# Patient Record
Sex: FEMALE | Race: WHITE | NOT HISPANIC OR LATINO | Employment: FULL TIME | URBAN - METROPOLITAN AREA
[De-identification: names, ages, dates, MRNs, and addresses within clinical notes are randomized per-mention and may not be internally consistent; named-entity substitution may affect disease eponyms.]

---

## 2017-03-22 ENCOUNTER — COMMUNICATION - HEALTHEAST (OUTPATIENT)
Dept: ADMINISTRATIVE | Facility: CLINIC | Age: 37
End: 2017-03-22

## 2017-03-24 ENCOUNTER — COMMUNICATION - HEALTHEAST (OUTPATIENT)
Dept: OBGYN | Facility: CLINIC | Age: 37
End: 2017-03-24

## 2017-06-23 ENCOUNTER — COMMUNICATION - HEALTHEAST (OUTPATIENT)
Dept: MIDWIFE SERVICES | Facility: CLINIC | Age: 37
End: 2017-06-23

## 2017-06-23 DIAGNOSIS — Z30.9 CONTRACEPTION MANAGEMENT: ICD-10-CM

## 2017-06-27 ENCOUNTER — COMMUNICATION - HEALTHEAST (OUTPATIENT)
Dept: MIDWIFE SERVICES | Facility: CLINIC | Age: 37
End: 2017-06-27

## 2017-06-29 ENCOUNTER — COMMUNICATION - HEALTHEAST (OUTPATIENT)
Dept: ADMINISTRATIVE | Facility: CLINIC | Age: 37
End: 2017-06-29

## 2017-07-17 ENCOUNTER — OFFICE VISIT - HEALTHEAST (OUTPATIENT)
Dept: MIDWIFE SERVICES | Facility: CLINIC | Age: 37
End: 2017-07-17

## 2017-07-17 DIAGNOSIS — Z30.9 CONTRACEPTION MANAGEMENT: ICD-10-CM

## 2017-07-17 DIAGNOSIS — Z00.00 ANNUAL PHYSICAL EXAM: ICD-10-CM

## 2017-07-17 ASSESSMENT — MIFFLIN-ST. JEOR: SCORE: 1209.75

## 2017-08-03 ENCOUNTER — COMMUNICATION - HEALTHEAST (OUTPATIENT)
Dept: OBGYN | Facility: CLINIC | Age: 37
End: 2017-08-03

## 2017-08-03 ENCOUNTER — AMBULATORY - HEALTHEAST (OUTPATIENT)
Dept: LAB | Facility: CLINIC | Age: 37
End: 2017-08-03

## 2017-08-03 DIAGNOSIS — Z00.00 ANNUAL PHYSICAL EXAM: ICD-10-CM

## 2017-08-03 LAB
CHOLEST SERPL-MCNC: 148 MG/DL
FASTING STATUS PATIENT QL REPORTED: YES
HDLC SERPL-MCNC: 55 MG/DL
LDLC SERPL CALC-MCNC: 84 MG/DL
TRIGL SERPL-MCNC: 45 MG/DL

## 2017-08-12 ENCOUNTER — HEALTH MAINTENANCE LETTER (OUTPATIENT)
Age: 37
End: 2017-08-12

## 2017-09-14 ENCOUNTER — COMMUNICATION - HEALTHEAST (OUTPATIENT)
Dept: MIDWIFE SERVICES | Facility: CLINIC | Age: 37
End: 2017-09-14

## 2017-09-14 ENCOUNTER — COMMUNICATION - HEALTHEAST (OUTPATIENT)
Dept: OBGYN | Facility: CLINIC | Age: 37
End: 2017-09-14

## 2017-09-18 ENCOUNTER — COMMUNICATION - HEALTHEAST (OUTPATIENT)
Dept: MIDWIFE SERVICES | Facility: CLINIC | Age: 37
End: 2017-09-18

## 2017-09-18 ENCOUNTER — AMBULATORY - HEALTHEAST (OUTPATIENT)
Dept: OBGYN | Facility: CLINIC | Age: 37
End: 2017-09-18

## 2017-09-18 ENCOUNTER — COMMUNICATION - HEALTHEAST (OUTPATIENT)
Dept: OBGYN | Facility: CLINIC | Age: 37
End: 2017-09-18

## 2017-09-18 DIAGNOSIS — Z30.9 CONTRACEPTIVE MANAGEMENT: ICD-10-CM

## 2017-10-04 ENCOUNTER — OFFICE VISIT - HEALTHEAST (OUTPATIENT)
Dept: MIDWIFE SERVICES | Facility: CLINIC | Age: 37
End: 2017-10-04

## 2017-10-04 DIAGNOSIS — Z23 NEED FOR INFLUENZA VACCINATION: ICD-10-CM

## 2017-10-04 DIAGNOSIS — F41.9 ANXIETY: ICD-10-CM

## 2017-10-04 DIAGNOSIS — F32.A DEPRESSION, UNSPECIFIED DEPRESSION TYPE: ICD-10-CM

## 2017-10-04 DIAGNOSIS — Z13.31 POSITIVE DEPRESSION SCREENING: ICD-10-CM

## 2017-10-04 ASSESSMENT — MIFFLIN-ST. JEOR: SCORE: 1230.16

## 2017-11-13 ENCOUNTER — OFFICE VISIT - HEALTHEAST (OUTPATIENT)
Dept: FAMILY MEDICINE | Facility: CLINIC | Age: 37
End: 2017-11-13

## 2017-11-13 DIAGNOSIS — F41.1 GAD (GENERALIZED ANXIETY DISORDER): ICD-10-CM

## 2017-11-13 DIAGNOSIS — F33.0 MILD EPISODE OF RECURRENT MAJOR DEPRESSIVE DISORDER (H): ICD-10-CM

## 2017-11-13 ASSESSMENT — MIFFLIN-ST. JEOR: SCORE: 1220.64

## 2017-11-22 ENCOUNTER — OFFICE VISIT - HEALTHEAST (OUTPATIENT)
Dept: FAMILY MEDICINE | Facility: CLINIC | Age: 37
End: 2017-11-22

## 2017-11-22 DIAGNOSIS — L01.00 IMPETIGO: ICD-10-CM

## 2017-11-30 ENCOUNTER — COMMUNICATION - HEALTHEAST (OUTPATIENT)
Dept: FAMILY MEDICINE | Facility: CLINIC | Age: 37
End: 2017-11-30

## 2017-11-30 DIAGNOSIS — F41.1 GAD (GENERALIZED ANXIETY DISORDER): ICD-10-CM

## 2017-11-30 DIAGNOSIS — F33.0 MILD EPISODE OF RECURRENT MAJOR DEPRESSIVE DISORDER (H): ICD-10-CM

## 2017-12-21 ENCOUNTER — OFFICE VISIT - HEALTHEAST (OUTPATIENT)
Dept: FAMILY MEDICINE | Facility: CLINIC | Age: 37
End: 2017-12-21

## 2017-12-21 DIAGNOSIS — L71.0 DERMATITIS, PERIORAL: ICD-10-CM

## 2017-12-21 ASSESSMENT — MIFFLIN-ST. JEOR: SCORE: 1231.07

## 2018-01-04 ENCOUNTER — COMMUNICATION - HEALTHEAST (OUTPATIENT)
Dept: FAMILY MEDICINE | Facility: CLINIC | Age: 38
End: 2018-01-04

## 2018-02-07 ENCOUNTER — COMMUNICATION - HEALTHEAST (OUTPATIENT)
Dept: MIDWIFE SERVICES | Facility: CLINIC | Age: 38
End: 2018-02-07

## 2018-02-07 DIAGNOSIS — Z30.41 ORAL CONTRACEPTIVE PILL SURVEILLANCE: ICD-10-CM

## 2018-05-06 ENCOUNTER — COMMUNICATION - HEALTHEAST (OUTPATIENT)
Dept: FAMILY MEDICINE | Facility: CLINIC | Age: 38
End: 2018-05-06

## 2018-05-07 ENCOUNTER — COMMUNICATION - HEALTHEAST (OUTPATIENT)
Dept: FAMILY MEDICINE | Facility: CLINIC | Age: 38
End: 2018-05-07

## 2018-05-07 DIAGNOSIS — F41.1 GAD (GENERALIZED ANXIETY DISORDER): ICD-10-CM

## 2018-05-07 DIAGNOSIS — F33.0 MILD EPISODE OF RECURRENT MAJOR DEPRESSIVE DISORDER (H): ICD-10-CM

## 2018-07-19 ENCOUNTER — OFFICE VISIT - HEALTHEAST (OUTPATIENT)
Dept: MIDWIFE SERVICES | Facility: CLINIC | Age: 38
End: 2018-07-19

## 2018-07-19 DIAGNOSIS — Z30.9 CONTRACEPTION MANAGEMENT: ICD-10-CM

## 2018-07-19 DIAGNOSIS — F32.A DEPRESSION: ICD-10-CM

## 2018-07-19 DIAGNOSIS — Z01.419 WELL WOMAN EXAM: ICD-10-CM

## 2018-07-19 DIAGNOSIS — Z30.41 ORAL CONTRACEPTIVE PILL SURVEILLANCE: ICD-10-CM

## 2018-07-19 DIAGNOSIS — F41.9 ANXIETY: ICD-10-CM

## 2018-07-19 LAB
ALBUMIN SERPL-MCNC: 4.3 G/DL (ref 3.5–5)
ALP SERPL-CCNC: 58 U/L (ref 45–120)
ALT SERPL W P-5'-P-CCNC: 14 U/L (ref 0–45)
ANION GAP SERPL CALCULATED.3IONS-SCNC: 8 MMOL/L (ref 5–18)
AST SERPL W P-5'-P-CCNC: 20 U/L (ref 0–40)
BILIRUB SERPL-MCNC: 0.7 MG/DL (ref 0–1)
BUN SERPL-MCNC: 10 MG/DL (ref 8–22)
CALCIUM SERPL-MCNC: 9.3 MG/DL (ref 8.5–10.5)
CHLORIDE BLD-SCNC: 107 MMOL/L (ref 98–107)
CO2 SERPL-SCNC: 24 MMOL/L (ref 22–31)
CREAT SERPL-MCNC: 0.73 MG/DL (ref 0.6–1.1)
ERYTHROCYTE [DISTWIDTH] IN BLOOD BY AUTOMATED COUNT: 12.8 % (ref 11–14.5)
GFR SERPL CREATININE-BSD FRML MDRD: >60 ML/MIN/1.73M2
GLUCOSE BLD-MCNC: 88 MG/DL (ref 70–125)
HCT VFR BLD AUTO: 37.6 % (ref 35–47)
HGB BLD-MCNC: 12.1 G/DL (ref 12–16)
MCH RBC QN AUTO: 29.9 PG (ref 27–34)
MCHC RBC AUTO-ENTMCNC: 32.2 G/DL (ref 32–36)
MCV RBC AUTO: 93 FL (ref 80–100)
PLATELET # BLD AUTO: 212 THOU/UL (ref 140–440)
PMV BLD AUTO: 10.2 FL (ref 8.5–12.5)
POTASSIUM BLD-SCNC: 4.4 MMOL/L (ref 3.5–5)
PROT SERPL-MCNC: 6.8 G/DL (ref 6–8)
RBC # BLD AUTO: 4.05 MILL/UL (ref 3.8–5.4)
SODIUM SERPL-SCNC: 139 MMOL/L (ref 136–145)
TSH SERPL DL<=0.005 MIU/L-ACNC: 0.94 UIU/ML (ref 0.3–5)
WBC: 4.9 THOU/UL (ref 4–11)

## 2018-07-19 ASSESSMENT — MIFFLIN-ST. JEOR: SCORE: 1269.17

## 2018-07-26 ENCOUNTER — COMMUNICATION - HEALTHEAST (OUTPATIENT)
Dept: MIDWIFE SERVICES | Facility: CLINIC | Age: 38
End: 2018-07-26

## 2018-08-01 ENCOUNTER — COMMUNICATION - HEALTHEAST (OUTPATIENT)
Dept: MIDWIFE SERVICES | Facility: CLINIC | Age: 38
End: 2018-08-01

## 2018-08-01 DIAGNOSIS — R32 URINARY INCONTINENCE IN FEMALE: ICD-10-CM

## 2018-08-28 ENCOUNTER — THERAPY VISIT (OUTPATIENT)
Dept: PHYSICAL THERAPY | Facility: CLINIC | Age: 38
End: 2018-08-28
Payer: COMMERCIAL

## 2018-08-28 DIAGNOSIS — M72.2 PLANTAR FASCIITIS, RIGHT: Primary | ICD-10-CM

## 2018-08-28 PROCEDURE — 97530 THERAPEUTIC ACTIVITIES: CPT | Mod: GP | Performed by: PHYSICAL THERAPIST

## 2018-08-28 PROCEDURE — 97161 PT EVAL LOW COMPLEX 20 MIN: CPT | Mod: GP | Performed by: PHYSICAL THERAPIST

## 2018-08-28 PROCEDURE — 97110 THERAPEUTIC EXERCISES: CPT | Mod: GP | Performed by: PHYSICAL THERAPIST

## 2018-08-28 NOTE — MR AVS SNAPSHOT
After Visit Summary   8/28/2018    Angelic Marrero    MRN: [id number]           Patient Information     Date Of Birth          1980        Visit Information        Provider Department      8/28/2018 10:50 AM Mary Guerrero PT [facility] Physical Therapy        Today's Diagnoses     Plantar fasciitis, right    -  1       Follow-ups after your visit        Who to contact     If you have questions or need follow up information about today's clinic visit or your schedule please contact [facility] PHYSICAL THERAPY directly at [phone].  Normal or non-critical lab and imaging results will be communicated to you by [email or web address], letter or phone within 4 business days after the clinic has received the results. If you do not hear from us within 7 days, please contact the clinic through [employer] or phone. If you have a critical or abnormal lab result, we will notify you by phone as soon as possible.  Submit refill requests through [employer] or call your pharmacy and they will forward the refill request to us. Please allow 3 business days for your refill to be completed.          Additional Information About Your Visit        MyChart Information     [employer] gives you secure access to your electronic health record. If you see a primary care provider, you can also send messages to your care team and make appointments. If you have questions, please call your primary care clinic.  If you do not have a primary care provider, please call [phone] and they will assist you.        Care EveryWhere ID     This is your Care EveryWhere ID. This could be used by other organizations to access your [facility] medical records  [id number]         Blood Pressure from Last 3 Encounters:   04/30/07 100/60   12/19/06 110/68   05/11/06 (!) 84/60    Weight from Last 3 Encounters:   04/30/07 55.5 kg (122 lb 6.4 oz)   12/19/06 57.2 kg (126 lb)   05/11/06 56.7 kg  (125 lb)              We Performed the Following     MAYNOR Inital Eval Report     PT Eval, Low Complexity (70828)     Therapeutic Activities     Therapeutic Exercises        Primary Care Provider Office Phone # Fax #    Jeovany [facility] [address] [phone] [address] [facility] [address]        Equal Access to Services     ESTHER KRAUSE : Hadii aad ku hadasho Soomaali, waaxda luqadaha, qaybta kaalmada adeegyada, waxay idiin hayaan adeeg majorgerardosilas lalillian velasco. So [facility] [place] [phone].    ATENCIÓN: Si habla español, tiene a ash disposición servicios gratuitos de asistencia lingüística. Christieame al [phone].    We comply with applicable federal civil rights laws and [place] laws. We do not discriminate on the basis of race, color, national origin, age, disability, sex, sexual orientation, or gender identity.            Thank you!     Thank you for choosing [facility]  for your care. Our goal is always to provide you with excellent care. Hearing back from our patients is one way we can continue to improve our services. Please take a few minutes to complete the written survey that you may receive in the mail after your visit with us. Thank you!             Your Updated Medication List - Protect others around you: Learn how to safely use, store and throw away your medicines at www.disposemymeds.org.          This list is accurate as of 8/28/18  2:32 PM.  Always use your most recent med list.                   Brand Name Dispense Instructions for use Diagnosis    chloroquine 500 MG tablet    ARALEN    6    one tablet weekly starting one week prior to departure and continuing for 4 weeks after return    Other specified counseling       ZYRTEC 10 MG tablet   Generic drug:  cetirizine     30    1 TABLET DAILY    Allergic rhinitis, cause unspecified

## 2018-08-28 NOTE — PROGRESS NOTES
[facility] Initial Evaluation        Subjective:  Patient is a 38 year old female presenting with rehab right ankle/foot hpi.   Angelic Marrero is a 38 year old female with a right foot condition.  Condition occurred with:  Repetition/overuse (Pt. had a gradual onset of R planatr foot pain x 3 weeks w/out incident. Pt. now has pain with amb > 10 min and is unable to run. She was running 4 miles 3 x week with no problem. Pt. has no hx of plantar foot pain but had R achilles injury 10 years ago.).  Condition occurred: for unknown reasons.  This is a new condition  8-7-18.    Patient reports pain:  Sub calcaneus and longitudinal arch.  Radiates to:  No radiation.  Pain is described as aching and is intermittent and reported as 4/10.  Associated symptoms:  Loss of motion/stiffness. Pain is worse in the A.M..  Symptoms are exacerbated by walking, standing and running and relieved by rest.  Since onset symptoms are unchanged.  Special testing: none.  Previous treatment: none.    General health as reported by patient is excellent.                                              Objective:  Standing Alignment:                Ankle/Foot:  Pes cavus L and pes cavus R    Gait:    Gait Type:  Normal         Flexibility/Screens:       Lower Extremity:      Decreased right lower extremity flexibility:  Gastroc and Soleus          Ankle/Foot Evaluation  ROM:    AROM:    Dorsiflexion:  Left:   12  Right:   10  Plantarflexion:  Left:  85    Right:  85  Inversion:  Left:  57     Right:  55  Eversion:  25     Right:  25        Strength:    Dorsiflexion:  Right: 5/5   Pain:  Plantarflexion: Right: 4/5  Pain:  Inversion:Right: 5/5  Pain:  Eversion:Right: 5/5  Pain:                  LIGAMENT TESTING: normal                PALPATION: Palpation of ankle: very tender at R plantar foot just distal to the calcaneous.    EDEMA: normal          MOBILITY TESTING: normal                                                                 General     ROS    Assessment/Plan:    Patient is a 38 year old female with right side ankle complaints.    Patient has the following significant findings with corresponding treatment plan.                Diagnosis 1:  R plantar fasciitis  Pain -  self management and education  Decreased ROM/flexibility - manual therapy and therapeutic exercise  Decreased strength - therapeutic exercise and therapeutic activities  Impaired muscle performance - neuro re-education  Decreased function - therapeutic activities    Therapy Evaluation Codes:   1) History comprised of:   Personal factors that impact the plan of care:      None.    Comorbidity factors that impact the plan of care are:      None.     Medications impacting care: None.  2) Examination of Body Systems comprised of:   Body structures and functions that impact the plan of care:      Ankle.   Activity limitations that impact the plan of care are:      Running and Walking.  3) Clinical presentation characteristics are:   Stable/Uncomplicated.  4) Decision-Making    Low complexity using standardized patient assessment instrument and/or measureable assessment of functional outcome.  Cumulative Therapy Evaluation is: Low complexity.    Previous and current functional limitations:  (See Goal Flow Sheet for this information)    Short term and Long term goals: (See Goal Flow Sheet for this information)     Communication ability:  Patient appears to be able to clearly communicate and understand verbal and written communication and follow directions correctly.  Treatment Explanation - The following has been discussed with the patient:   RX ordered/plan of care  Anticipated outcomes  Possible risks and side effects  This patient would benefit from PT intervention to resume normal activities.   Rehab potential is good.    Frequency:  2 X a month, once daily  Duration:  for 2 months  Discharge Plan:  Achieve all LTG.  Independent in home treatment program.  Reach  maximal therapeutic benefit.    Please refer to the daily flowsheet for treatment today, total treatment time and time spent performing 1:1 timed codes.

## 2018-09-10 ENCOUNTER — COMMUNICATION - HEALTHEAST (OUTPATIENT)
Dept: FAMILY MEDICINE | Facility: CLINIC | Age: 38
End: 2018-09-10

## 2018-09-10 DIAGNOSIS — F33.0 MILD EPISODE OF RECURRENT MAJOR DEPRESSIVE DISORDER (H): ICD-10-CM

## 2018-09-10 DIAGNOSIS — F41.1 GAD (GENERALIZED ANXIETY DISORDER): ICD-10-CM

## 2018-10-09 ENCOUNTER — COMMUNICATION - HEALTHEAST (OUTPATIENT)
Dept: MIDWIFE SERVICES | Facility: CLINIC | Age: 38
End: 2018-10-09

## 2018-10-09 DIAGNOSIS — Z30.41 ORAL CONTRACEPTIVE PILL SURVEILLANCE: ICD-10-CM

## 2018-10-26 ENCOUNTER — OFFICE VISIT - HEALTHEAST (OUTPATIENT)
Dept: FAMILY MEDICINE | Facility: CLINIC | Age: 38
End: 2018-10-26

## 2018-10-26 DIAGNOSIS — F41.1 GAD (GENERALIZED ANXIETY DISORDER): ICD-10-CM

## 2018-10-26 DIAGNOSIS — F33.0 MILD EPISODE OF RECURRENT MAJOR DEPRESSIVE DISORDER (H): ICD-10-CM

## 2018-10-26 DIAGNOSIS — R63.5 WEIGHT GAIN: ICD-10-CM

## 2018-10-26 DIAGNOSIS — D64.9 ANEMIA, UNSPECIFIED TYPE: ICD-10-CM

## 2018-10-26 DIAGNOSIS — Z30.015 ENCOUNTER FOR INITIAL PRESCRIPTION OF VAGINAL RING HORMONAL CONTRACEPTIVE: ICD-10-CM

## 2018-10-26 DIAGNOSIS — R32 URINARY INCONTINENCE IN FEMALE: ICD-10-CM

## 2018-10-26 DIAGNOSIS — R53.83 FATIGUE, UNSPECIFIED TYPE: ICD-10-CM

## 2018-10-26 LAB
ERYTHROCYTE [DISTWIDTH] IN BLOOD BY AUTOMATED COUNT: 10.9 % (ref 11–14.5)
HCT VFR BLD AUTO: 40 % (ref 35–47)
HGB BLD-MCNC: 13.1 G/DL (ref 12–16)
IRON SATN MFR SERPL: 27 % (ref 20–50)
IRON SERPL-MCNC: 95 UG/DL (ref 42–175)
MCH RBC QN AUTO: 30.4 PG (ref 27–34)
MCHC RBC AUTO-ENTMCNC: 32.7 G/DL (ref 32–36)
MCV RBC AUTO: 93 FL (ref 80–100)
PLATELET # BLD AUTO: 259 THOU/UL (ref 140–440)
PMV BLD AUTO: 7.7 FL (ref 7–10)
RBC # BLD AUTO: 4.32 MILL/UL (ref 3.8–5.4)
TIBC SERPL-MCNC: 356 UG/DL (ref 313–563)
TRANSFERRIN SERPL-MCNC: 285 MG/DL (ref 212–360)
TSH SERPL DL<=0.005 MIU/L-ACNC: 0.86 UIU/ML (ref 0.3–5)
WBC: 7.3 THOU/UL (ref 4–11)

## 2018-10-26 ASSESSMENT — MIFFLIN-ST. JEOR: SCORE: 1282.32

## 2018-12-14 ENCOUNTER — COMMUNICATION - HEALTHEAST (OUTPATIENT)
Dept: MIDWIFE SERVICES | Facility: CLINIC | Age: 38
End: 2018-12-14

## 2018-12-17 ENCOUNTER — AMBULATORY - HEALTHEAST (OUTPATIENT)
Dept: OBGYN | Facility: CLINIC | Age: 38
End: 2018-12-17

## 2018-12-17 DIAGNOSIS — Z30.011 ENCOUNTER FOR INITIAL PRESCRIPTION OF CONTRACEPTIVE PILLS: ICD-10-CM

## 2019-01-16 ENCOUNTER — COMMUNICATION - HEALTHEAST (OUTPATIENT)
Dept: FAMILY MEDICINE | Facility: CLINIC | Age: 39
End: 2019-01-16

## 2019-01-16 DIAGNOSIS — F33.0 MILD EPISODE OF RECURRENT MAJOR DEPRESSIVE DISORDER (H): ICD-10-CM

## 2019-01-16 DIAGNOSIS — F41.1 GAD (GENERALIZED ANXIETY DISORDER): ICD-10-CM

## 2019-01-28 ENCOUNTER — RECORDS - HEALTHEAST (OUTPATIENT)
Dept: ADMINISTRATIVE | Facility: OTHER | Age: 39
End: 2019-01-28

## 2019-03-19 ENCOUNTER — COMMUNICATION - HEALTHEAST (OUTPATIENT)
Dept: FAMILY MEDICINE | Facility: CLINIC | Age: 39
End: 2019-03-19

## 2019-06-10 ENCOUNTER — COMMUNICATION - HEALTHEAST (OUTPATIENT)
Dept: FAMILY MEDICINE | Facility: CLINIC | Age: 39
End: 2019-06-10

## 2019-06-26 ENCOUNTER — OFFICE VISIT - HEALTHEAST (OUTPATIENT)
Dept: FAMILY MEDICINE | Facility: CLINIC | Age: 39
End: 2019-06-26

## 2019-06-26 DIAGNOSIS — F32.0 MILD MAJOR DEPRESSION (H): ICD-10-CM

## 2019-06-26 DIAGNOSIS — F33.0 MILD EPISODE OF RECURRENT MAJOR DEPRESSIVE DISORDER (H): ICD-10-CM

## 2019-06-26 ASSESSMENT — MIFFLIN-ST. JEOR: SCORE: 1281.87

## 2019-07-31 ENCOUNTER — OFFICE VISIT - HEALTHEAST (OUTPATIENT)
Dept: FAMILY MEDICINE | Facility: CLINIC | Age: 39
End: 2019-07-31

## 2019-07-31 DIAGNOSIS — F33.0 MILD EPISODE OF RECURRENT MAJOR DEPRESSIVE DISORDER (H): ICD-10-CM

## 2019-07-31 ASSESSMENT — MIFFLIN-ST. JEOR: SCORE: 1289.13

## 2019-08-01 ENCOUNTER — OFFICE VISIT - HEALTHEAST (OUTPATIENT)
Dept: MIDWIFE SERVICES | Facility: CLINIC | Age: 39
End: 2019-08-01

## 2019-08-01 DIAGNOSIS — R32 URINARY INCONTINENCE IN FEMALE: ICD-10-CM

## 2019-08-01 DIAGNOSIS — F33.0 MILD EPISODE OF RECURRENT MAJOR DEPRESSIVE DISORDER (H): ICD-10-CM

## 2019-08-01 DIAGNOSIS — F41.1 GENERALIZED ANXIETY DISORDER: ICD-10-CM

## 2019-08-01 DIAGNOSIS — Z01.419 WELL WOMAN EXAM: ICD-10-CM

## 2019-08-01 DIAGNOSIS — N39.3 STRESS INCONTINENCE: ICD-10-CM

## 2019-08-01 DIAGNOSIS — Z30.015 ENCOUNTER FOR INITIAL PRESCRIPTION OF VAGINAL RING HORMONAL CONTRACEPTIVE: ICD-10-CM

## 2019-08-01 ASSESSMENT — MIFFLIN-ST. JEOR: SCORE: 1285.5

## 2019-09-17 ENCOUNTER — OFFICE VISIT - HEALTHEAST (OUTPATIENT)
Dept: PHYSICAL THERAPY | Facility: REHABILITATION | Age: 39
End: 2019-09-17

## 2019-09-17 DIAGNOSIS — N39.3 STRESS INCONTINENCE: ICD-10-CM

## 2019-11-06 ENCOUNTER — HEALTH MAINTENANCE LETTER (OUTPATIENT)
Age: 39
End: 2019-11-06

## 2019-11-26 ENCOUNTER — COMMUNICATION - HEALTHEAST (OUTPATIENT)
Dept: MIDWIFE SERVICES | Facility: CLINIC | Age: 39
End: 2019-11-26

## 2019-11-26 DIAGNOSIS — Z30.011 ENCOUNTER FOR INITIAL PRESCRIPTION OF CONTRACEPTIVE PILLS: ICD-10-CM

## 2019-12-17 ENCOUNTER — COMMUNICATION - HEALTHEAST (OUTPATIENT)
Dept: FAMILY MEDICINE | Facility: CLINIC | Age: 39
End: 2019-12-17

## 2019-12-18 ENCOUNTER — OFFICE VISIT - HEALTHEAST (OUTPATIENT)
Dept: FAMILY MEDICINE | Facility: CLINIC | Age: 39
End: 2019-12-18

## 2019-12-18 DIAGNOSIS — E61.1 IRON DEFICIENCY: ICD-10-CM

## 2019-12-18 DIAGNOSIS — R53.82 CHRONIC FATIGUE: ICD-10-CM

## 2019-12-18 DIAGNOSIS — R20.0 ARM NUMBNESS LEFT: ICD-10-CM

## 2019-12-18 DIAGNOSIS — F32.0 MILD MAJOR DEPRESSION (H): ICD-10-CM

## 2019-12-18 LAB
ERYTHROCYTE [DISTWIDTH] IN BLOOD BY AUTOMATED COUNT: 12.8 % (ref 11–14.5)
HCT VFR BLD AUTO: 39.4 % (ref 35–47)
HGB BLD-MCNC: 12.8 G/DL (ref 12–16)
IRON SATN MFR SERPL: 24 % (ref 20–50)
IRON SERPL-MCNC: 89 UG/DL (ref 42–175)
MCH RBC QN AUTO: 29.9 PG (ref 27–34)
MCHC RBC AUTO-ENTMCNC: 32.5 G/DL (ref 32–36)
MCV RBC AUTO: 92 FL (ref 80–100)
PLATELET # BLD AUTO: 246 THOU/UL (ref 140–440)
PMV BLD AUTO: 10.3 FL (ref 8.5–12.5)
RBC # BLD AUTO: 4.28 MILL/UL (ref 3.8–5.4)
TIBC SERPL-MCNC: 374 UG/DL (ref 313–563)
TRANSFERRIN SERPL-MCNC: 299 MG/DL (ref 212–360)
TSH SERPL DL<=0.005 MIU/L-ACNC: 0.77 UIU/ML (ref 0.3–5)
VIT B12 SERPL-MCNC: 1028 PG/ML (ref 213–816)
WBC: 7.1 THOU/UL (ref 4–11)

## 2019-12-18 ASSESSMENT — MIFFLIN-ST. JEOR: SCORE: 1292.3

## 2019-12-20 ENCOUNTER — COMMUNICATION - HEALTHEAST (OUTPATIENT)
Dept: FAMILY MEDICINE | Facility: CLINIC | Age: 39
End: 2019-12-20

## 2019-12-30 ASSESSMENT — ANXIETY QUESTIONNAIRES
7. FEELING AFRAID AS IF SOMETHING AWFUL MIGHT HAPPEN: NOT AT ALL
GAD7 TOTAL SCORE: 3
1. FEELING NERVOUS, ANXIOUS, OR ON EDGE: SEVERAL DAYS
4. TROUBLE RELAXING: NOT AT ALL
6. BECOMING EASILY ANNOYED OR IRRITABLE: MORE THAN HALF THE DAYS
5. BEING SO RESTLESS THAT IT IS HARD TO SIT STILL: NOT AT ALL
2. NOT BEING ABLE TO STOP OR CONTROL WORRYING: NOT AT ALL
3. WORRYING TOO MUCH ABOUT DIFFERENT THINGS: NOT AT ALL

## 2019-12-30 ASSESSMENT — PATIENT HEALTH QUESTIONNAIRE - PHQ9: SUM OF ALL RESPONSES TO PHQ QUESTIONS 1-9: 9

## 2019-12-31 ENCOUNTER — COMMUNICATION - HEALTHEAST (OUTPATIENT)
Dept: FAMILY MEDICINE | Facility: CLINIC | Age: 39
End: 2019-12-31

## 2020-02-25 ENCOUNTER — COMMUNICATION - HEALTHEAST (OUTPATIENT)
Dept: SCHEDULING | Facility: CLINIC | Age: 40
End: 2020-02-25

## 2020-02-25 ENCOUNTER — OFFICE VISIT - HEALTHEAST (OUTPATIENT)
Dept: INTERNAL MEDICINE | Facility: CLINIC | Age: 40
End: 2020-02-25

## 2020-02-25 DIAGNOSIS — J32.9 RHINOSINUSITIS: ICD-10-CM

## 2020-02-25 DIAGNOSIS — J30.9 ALLERGIC RHINITIS, UNSPECIFIED SEASONALITY, UNSPECIFIED TRIGGER: ICD-10-CM

## 2020-02-25 DIAGNOSIS — R09.82 PND (POST-NASAL DRIP): ICD-10-CM

## 2020-02-25 DIAGNOSIS — J40 BRONCHITIS: ICD-10-CM

## 2020-03-30 ENCOUNTER — OFFICE VISIT - HEALTHEAST (OUTPATIENT)
Dept: FAMILY MEDICINE | Facility: CLINIC | Age: 40
End: 2020-03-30

## 2020-03-30 DIAGNOSIS — R05.9 COUGH: ICD-10-CM

## 2020-04-17 ENCOUNTER — COMMUNICATION - HEALTHEAST (OUTPATIENT)
Dept: FAMILY MEDICINE | Facility: CLINIC | Age: 40
End: 2020-04-17

## 2020-04-17 DIAGNOSIS — K90.41 GLUTEN INTOLERANCE: ICD-10-CM

## 2020-04-21 ENCOUNTER — COMMUNICATION - HEALTHEAST (OUTPATIENT)
Dept: FAMILY MEDICINE | Facility: CLINIC | Age: 40
End: 2020-04-21

## 2020-04-21 DIAGNOSIS — R05.9 COUGH: ICD-10-CM

## 2020-05-19 ENCOUNTER — COMMUNICATION - HEALTHEAST (OUTPATIENT)
Dept: FAMILY MEDICINE | Facility: CLINIC | Age: 40
End: 2020-05-19

## 2020-05-19 ENCOUNTER — AMBULATORY - HEALTHEAST (OUTPATIENT)
Dept: LAB | Facility: CLINIC | Age: 40
End: 2020-05-19

## 2020-05-19 DIAGNOSIS — K90.41 GLUTEN INTOLERANCE: ICD-10-CM

## 2020-05-20 LAB
GLIADIN IGA SER-ACNC: 1.3 U/ML
GLIADIN IGG SER-ACNC: <0.4 U/ML
IGA SERPL-MCNC: 183 MG/DL (ref 65–400)
TTG IGA SER-ACNC: 0.4 U/ML
TTG IGG SER-ACNC: <0.6 U/ML

## 2020-05-27 ENCOUNTER — OFFICE VISIT - HEALTHEAST (OUTPATIENT)
Dept: FAMILY MEDICINE | Facility: CLINIC | Age: 40
End: 2020-05-27

## 2020-05-27 DIAGNOSIS — F32.0 MILD MAJOR DEPRESSION (H): ICD-10-CM

## 2020-05-27 DIAGNOSIS — Z20.822 EXPOSURE TO 2019 NOVEL CORONAVIRUS: ICD-10-CM

## 2020-05-27 ASSESSMENT — ANXIETY QUESTIONNAIRES
1. FEELING NERVOUS, ANXIOUS, OR ON EDGE: NOT AT ALL
5. BEING SO RESTLESS THAT IT IS HARD TO SIT STILL: NOT AT ALL
GAD7 TOTAL SCORE: 1
3. WORRYING TOO MUCH ABOUT DIFFERENT THINGS: NOT AT ALL
4. TROUBLE RELAXING: NOT AT ALL
6. BECOMING EASILY ANNOYED OR IRRITABLE: SEVERAL DAYS
2. NOT BEING ABLE TO STOP OR CONTROL WORRYING: NOT AT ALL
7. FEELING AFRAID AS IF SOMETHING AWFUL MIGHT HAPPEN: NOT AT ALL

## 2020-05-27 ASSESSMENT — PATIENT HEALTH QUESTIONNAIRE - PHQ9: SUM OF ALL RESPONSES TO PHQ QUESTIONS 1-9: 3

## 2020-05-29 ENCOUNTER — AMBULATORY - HEALTHEAST (OUTPATIENT)
Dept: LAB | Facility: CLINIC | Age: 40
End: 2020-05-29

## 2020-05-29 DIAGNOSIS — Z20.822 EXPOSURE TO 2019 NOVEL CORONAVIRUS: ICD-10-CM

## 2020-06-29 ENCOUNTER — VIRTUAL VISIT (OUTPATIENT)
Dept: FAMILY MEDICINE | Facility: OTHER | Age: 40
End: 2020-06-29

## 2020-06-29 ENCOUNTER — AMBULATORY - HEALTHEAST (OUTPATIENT)
Dept: FAMILY MEDICINE | Facility: CLINIC | Age: 40
End: 2020-06-29

## 2020-06-29 DIAGNOSIS — Z20.822 SUSPECTED COVID-19 VIRUS INFECTION: ICD-10-CM

## 2020-06-29 NOTE — PROGRESS NOTES
"Date: 2020 10:30:55  Clinician: Cristy Johnson  Clinician NPI: [id number]  Patient: Angelic Marrero  Patient : 1980  Patient Address: [address] [place] [address]  Patient Phone: [phone]  Visit Protocol: URI  Patient Summary:  Angelic is a 39 year old ( : 1980 ) female who initiated a Visit for COVID-19 (Coronavirus) evaluation and screening. When asked the question \"Please sign me up to receive news, health information and promotions. \", Angelic responded \"No\".    Angelic states her symptoms started suddenly 3-4 days ago. After her symptoms started, they improved and then got worse again.   Her symptoms consist of malaise, a headache, a sore throat, enlarged lymph nodes, and myalgia.   Symptom details     Sore throat: Angelic reports having moderate throat pain (4-6 on a 10 point pain scale), does not have exudate on her tonsils, and can swallow liquids. The lymph nodes in her neck are enlarged. A rash has not appeared on the skin since the sore throat started.     Headache: She states the headache is mild (1-3 on a 10 point pain scale).      Angelic denies having wheezing, nausea, teeth pain, ageusia, diarrhea, vomiting, rhinitis, ear pain, chills, anosmia, facial pain or pressure, fever, cough, and nasal congestion. She also denies having recent facial or sinus surgery in the past 60 days, taking antibiotic medication in the past month, and having a sinus infection within the past year. She is not experiencing dyspnea.   Precipitating events  Within the past week, Angelic has not been exposed to someone with strep throat. She has not recently been exposed to someone with influenza. Angelic has been in close contact with the following high risk individuals: children under the age of 5.   Pertinent COVID-19 (Coronavirus) information  In the past 14 days, Angelic has not worked in a congregate living setting.   She does not work or volunteer as healthcare " worker or a  and does not work or volunteer in a healthcare facility.   Angelic also has not lived in a congregate living setting in the past 14 days. She does not live with a healthcare worker.   Angelic has not had a close contact with a laboratory-confirmed COVID-19 patient within 14 days of symptom onset.   Pertinent medical history  Angelic does not get yeast infections when she takes antibiotics.   Angelic does not need a return to work/school note.   Weight: 138 lbs   Angelic does not smoke or use smokeless tobacco.   She denies pregnancy and denies breastfeeding. She has menstruated in the past month.   Weight: 138 lbs    MEDICATIONS: Estarylla oral, Zyrtec oral, citalopram oral, ALLERGIES: NKDA  Clinician Response:  Dear Angelic,   Your symptoms show that you may have coronavirus (COVID-19). This illness can cause fever, cough and trouble breathing. Many people get a mild case and get better on their own. Some people can get very sick.  What should I do?  We would like to test you for this virus.   1. Please call [phone] to schedule your visit. Explain that you were referred by [facility] to have a COVID-19 test. Be ready to share your [facility] visit ID number.  The following will serve as your written order for this COVID Test, ordered by me, for the indication of suspected COVID [Z20.828]: The test will be ordered in [employer], our electronic health record, after you are scheduled. It will show as ordered and authorized by Paul Bautista MD.  Order: COVID-19 (Coronavirus) PCR for SYMPTOMATIC testing from [facility].      2. When it's time for your COVID test:  Stay at least 6 feet away from others. (If someone will drive you to your test, stay in the backseat, as far away from the  as you can.)   Cover your mouth and nose with a mask, tissue or washcloth.  Go straight to the testing site. Don't make any stops on the way there or back.      3.Starting now: Stay home and away from others  "(self-isolate) until:   You've had no fever---and no medicine that reduces fever---for 3 full days (72 hours). And...   Your other symptoms have gotten better. For example, your cough or breathing has improved. And...   At least 10 days have passed since your symptoms started.       During this time, don't leave the house except for testing or medical care.   Stay in your own room, even for meals. Use your own bathroom if you can.   Stay away from others in your home. No hugging, kissing or shaking hands. No visitors.  Don't go to work, school or anywhere else.    Clean \"high touch\" surfaces often (doorknobs, counters, handles, etc.). Use a household cleaning spray or wipes. You'll find a full list of  on the EPA website: www.epa.gov/pesticide-registration/list-n-disinfectants-use-against-sars-cov-2.   Cover your mouth and nose with a mask, tissue or washcloth to avoid spreading germs.  Wash your hands and face often. Use soap and water.  Caregivers in these groups are at risk for severe illness due to COVID-19:  o People 65 years and older  o People who live in a nursing home or long-term care facility  o People with chronic disease (lung, heart, cancer, diabetes, kidney, liver, immunologic)  o People who have a weakened immune system, including those who:   Are in cancer treatment  Take medicine that weakens the immune system, such as corticosteroids  Had a bone marrow or organ transplant  Have an immune deficiency  Have poorly controlled HIV or AIDS  Are obese (body mass index of 40 or higher)  Smoke regularly   o Caregivers should wear gloves while washing dishes, handling laundry and cleaning bedrooms and bathrooms.  o Use caution when washing and drying laundry: Don't shake dirty laundry, and use the warmest water setting that you can.  o For more tips, go to www.cdc.gov/coronavirus/2019-ncov/downloads/10Things.pdf.      How can I take care of myself?   Get lots of rest. Drink extra fluids (unless a " doctor has told you not to).   Take Tylenol (acetaminophen) for fever or pain. If you have liver or kidney problems, ask your family doctor if it's okay to take Tylenol.   Adults can take either:    650 mg (two 325 mg pills) every 4 to 6 hours, or...   1,000 mg (two 500 mg pills) every 8 hours as needed.    Note: Don't take more than 3,000 mg in one day. Acetaminophen is found in many medicines (both prescribed and over-the-counter medicines). Read all labels to be sure you don't take too much.   For children, check the Tylenol bottle for the right dose. The dose is based on the child's age or weight.    If you have other health problems (like cancer, heart failure, an organ transplant or severe kidney disease): Call your specialty clinic if you don't feel better in the next 2 days.       Know when to call 911. Emergency warning signs include:    Trouble breathing or shortness of breath Pain or pressure in the chest that doesn't go away Feeling confused like you haven't felt before, or not being able to wake up Bluish-colored lips or face.  Where can I get more information?   [facility] -- About COVID-19: [email or web address]   CDC -- What to Do If You're Sick: www.cdc.gov/coronavirus/2019-ncov/about/steps-when-sick.html   CDC -- Ending Home Isolation: www.cdc.gov/coronavirus/2019-ncov/hcp/disposition-in-home-patients.html   CDC -- Caring for Someone: www.cdc.gov/coronavirus/2019-ncov/if-you-are-sick/care-for-someone.html   [facility] -- Interim Guidance for Hospital Discharge to Home: www.health.[facility].[place]/diseases/coronavirus/hcp/hospdischarge.pdf   [facility] clinical trials (COVID-19 research studies): clinicalaffairs.[facility]/umn-clinical-trials    Below are the COVID-19 hotlines at the [place] Department of Health ([facility]). Interpreters are available.    For health questions: Call 443-822-2209 or 7-502-239-4707 (7 a.m. to 7 p.m.) For questions about schools and childcare: Call  [phone] or [phone] (7 a.m. to 7 p.m.)    Diagnosis: Cough  Diagnosis ICD: R05  Additional Clinician Notes: Dear Angelic, You could possibly have covid so we would like you to be tested. Please call the number listed to set up testing and you also need to self isolate for 10 days per instructions listed. I hope you feel better soon. Take care.

## 2020-06-30 ENCOUNTER — AMBULATORY - HEALTHEAST (OUTPATIENT)
Dept: FAMILY MEDICINE | Facility: CLINIC | Age: 40
End: 2020-06-30

## 2020-06-30 DIAGNOSIS — Z20.822 SUSPECTED COVID-19 VIRUS INFECTION: ICD-10-CM

## 2020-07-02 ENCOUNTER — COMMUNICATION - HEALTHEAST (OUTPATIENT)
Dept: FAMILY MEDICINE | Facility: CLINIC | Age: 40
End: 2020-07-02

## 2020-07-30 ENCOUNTER — COMMUNICATION - HEALTHEAST (OUTPATIENT)
Dept: MIDWIFE SERVICES | Facility: CLINIC | Age: 40
End: 2020-07-30

## 2020-08-20 ENCOUNTER — RECORDS - HEALTHEAST (OUTPATIENT)
Dept: ADMINISTRATIVE | Facility: OTHER | Age: 40
End: 2020-08-20

## 2020-09-14 ENCOUNTER — COMMUNICATION - HEALTHEAST (OUTPATIENT)
Dept: FAMILY MEDICINE | Facility: CLINIC | Age: 40
End: 2020-09-14

## 2020-09-14 DIAGNOSIS — F33.0 MILD EPISODE OF RECURRENT MAJOR DEPRESSIVE DISORDER (H): ICD-10-CM

## 2020-10-09 ENCOUNTER — COMMUNICATION - HEALTHEAST (OUTPATIENT)
Dept: MIDWIFE SERVICES | Facility: CLINIC | Age: 40
End: 2020-10-09

## 2020-10-09 DIAGNOSIS — Z30.011 ENCOUNTER FOR INITIAL PRESCRIPTION OF CONTRACEPTIVE PILLS: ICD-10-CM

## 2020-11-02 ENCOUNTER — COMMUNICATION - HEALTHEAST (OUTPATIENT)
Dept: INTERNAL MEDICINE | Facility: CLINIC | Age: 40
End: 2020-11-02

## 2020-11-18 ENCOUNTER — COMMUNICATION - HEALTHEAST (OUTPATIENT)
Dept: FAMILY MEDICINE | Facility: CLINIC | Age: 40
End: 2020-11-18

## 2020-11-18 DIAGNOSIS — Z30.011 ENCOUNTER FOR INITIAL PRESCRIPTION OF CONTRACEPTIVE PILLS: ICD-10-CM

## 2020-11-29 ENCOUNTER — HEALTH MAINTENANCE LETTER (OUTPATIENT)
Age: 40
End: 2020-11-29

## 2020-12-01 ENCOUNTER — OFFICE VISIT - HEALTHEAST (OUTPATIENT)
Dept: INTERNAL MEDICINE | Facility: CLINIC | Age: 40
End: 2020-12-01

## 2020-12-01 DIAGNOSIS — Z30.09 ENCOUNTER FOR OTHER GENERAL COUNSELING OR ADVICE ON CONTRACEPTION: ICD-10-CM

## 2020-12-01 DIAGNOSIS — F33.0 MILD EPISODE OF RECURRENT MAJOR DEPRESSIVE DISORDER (H): ICD-10-CM

## 2020-12-01 DIAGNOSIS — R32 URINARY INCONTINENCE IN FEMALE: ICD-10-CM

## 2020-12-01 DIAGNOSIS — Z30.011 ENCOUNTER FOR INITIAL PRESCRIPTION OF CONTRACEPTIVE PILLS: ICD-10-CM

## 2020-12-01 DIAGNOSIS — F32.0 MILD MAJOR DEPRESSION (H): ICD-10-CM

## 2020-12-01 DIAGNOSIS — Z00.00 ANNUAL PHYSICAL EXAM: ICD-10-CM

## 2020-12-01 DIAGNOSIS — F41.1 GENERALIZED ANXIETY DISORDER: ICD-10-CM

## 2020-12-01 DIAGNOSIS — J30.2 SEASONAL ALLERGIC RHINITIS, UNSPECIFIED TRIGGER: ICD-10-CM

## 2020-12-01 ASSESSMENT — ANXIETY QUESTIONNAIRES
4. TROUBLE RELAXING: NOT AT ALL
5. BEING SO RESTLESS THAT IT IS HARD TO SIT STILL: NOT AT ALL
3. WORRYING TOO MUCH ABOUT DIFFERENT THINGS: NOT AT ALL
2. NOT BEING ABLE TO STOP OR CONTROL WORRYING: NOT AT ALL
1. FEELING NERVOUS, ANXIOUS, OR ON EDGE: SEVERAL DAYS
7. FEELING AFRAID AS IF SOMETHING AWFUL MIGHT HAPPEN: NOT AT ALL
GAD7 TOTAL SCORE: 3
IF YOU CHECKED OFF ANY PROBLEMS ON THIS QUESTIONNAIRE, HOW DIFFICULT HAVE THESE PROBLEMS MADE IT FOR YOU TO DO YOUR WORK, TAKE CARE OF THINGS AT HOME, OR GET ALONG WITH OTHER PEOPLE: NOT DIFFICULT AT ALL
6. BECOMING EASILY ANNOYED OR IRRITABLE: MORE THAN HALF THE DAYS

## 2020-12-01 ASSESSMENT — PATIENT HEALTH QUESTIONNAIRE - PHQ9: SUM OF ALL RESPONSES TO PHQ QUESTIONS 1-9: 1

## 2020-12-01 ASSESSMENT — MIFFLIN-ST. JEOR: SCORE: 1312.15

## 2020-12-02 LAB
HPV SOURCE: NORMAL
HUMAN PAPILLOMA VIRUS 16 DNA: NEGATIVE
HUMAN PAPILLOMA VIRUS 18 DNA: NEGATIVE
HUMAN PAPILLOMA VIRUS FINAL DIAGNOSIS: NORMAL
HUMAN PAPILLOMA VIRUS OTHER HR: NEGATIVE
SPECIMEN DESCRIPTION: NORMAL

## 2020-12-08 LAB
BKR LAB AP ABNORMAL BLEEDING: NO
BKR LAB AP BIRTH CONTROL/HORMONES: NORMAL
BKR LAB AP CERVICAL APPEARANCE: NORMAL
BKR LAB AP GYN ADEQUACY: NORMAL
BKR LAB AP GYN INTERPRETATION: NORMAL
BKR LAB AP HPV REFLEX: NORMAL
BKR LAB AP LMP: NORMAL
BKR LAB AP PATIENT STATUS: NORMAL
BKR LAB AP PREVIOUS ABNORMAL: NORMAL
BKR LAB AP PREVIOUS NORMAL: 2015
HIGH RISK?: NO
PATH REPORT.COMMENTS IMP SPEC: NORMAL
RESULT FLAG (HE HISTORICAL CONVERSION): NORMAL

## 2021-02-03 ENCOUNTER — COMMUNICATION - HEALTHEAST (OUTPATIENT)
Dept: INTERNAL MEDICINE | Facility: CLINIC | Age: 41
End: 2021-02-03

## 2021-02-17 ENCOUNTER — OFFICE VISIT - HEALTHEAST (OUTPATIENT)
Dept: FAMILY MEDICINE | Facility: CLINIC | Age: 41
End: 2021-02-17

## 2021-02-17 DIAGNOSIS — F41.1 GENERALIZED ANXIETY DISORDER: ICD-10-CM

## 2021-02-17 DIAGNOSIS — R41.840 CONCENTRATION DEFICIT: ICD-10-CM

## 2021-02-17 DIAGNOSIS — F32.0 MILD MAJOR DEPRESSION (H): ICD-10-CM

## 2021-02-17 DIAGNOSIS — Z30.41 ENCOUNTER FOR SURVEILLANCE OF CONTRACEPTIVE PILLS: ICD-10-CM

## 2021-02-17 ASSESSMENT — ANXIETY QUESTIONNAIRES
4. TROUBLE RELAXING: SEVERAL DAYS
6. BECOMING EASILY ANNOYED OR IRRITABLE: MORE THAN HALF THE DAYS
1. FEELING NERVOUS, ANXIOUS, OR ON EDGE: SEVERAL DAYS
5. BEING SO RESTLESS THAT IT IS HARD TO SIT STILL: NOT AT ALL
2. NOT BEING ABLE TO STOP OR CONTROL WORRYING: NOT AT ALL
3. WORRYING TOO MUCH ABOUT DIFFERENT THINGS: NOT AT ALL
7. FEELING AFRAID AS IF SOMETHING AWFUL MIGHT HAPPEN: NOT AT ALL
IF YOU CHECKED OFF ANY PROBLEMS ON THIS QUESTIONNAIRE, HOW DIFFICULT HAVE THESE PROBLEMS MADE IT FOR YOU TO DO YOUR WORK, TAKE CARE OF THINGS AT HOME, OR GET ALONG WITH OTHER PEOPLE: SOMEWHAT DIFFICULT
GAD7 TOTAL SCORE: 4

## 2021-02-17 ASSESSMENT — PATIENT HEALTH QUESTIONNAIRE - PHQ9: SUM OF ALL RESPONSES TO PHQ QUESTIONS 1-9: 6

## 2021-02-23 ENCOUNTER — COMMUNICATION - HEALTHEAST (OUTPATIENT)
Dept: FAMILY MEDICINE | Facility: CLINIC | Age: 41
End: 2021-02-23

## 2021-03-02 ENCOUNTER — COMMUNICATION - HEALTHEAST (OUTPATIENT)
Dept: FAMILY MEDICINE | Facility: CLINIC | Age: 41
End: 2021-03-02

## 2021-03-02 ENCOUNTER — TELEPHONE (OUTPATIENT)
Dept: PSYCHIATRY | Facility: CLINIC | Age: 41
End: 2021-03-02

## 2021-03-02 NOTE — TELEPHONE ENCOUNTER
PSYCHIATRY CLINIC PHONE INTAKE     SERVICES REQUESTED / INTERESTED IN          Other:  ADHD evaluation, ongoing med management    Presenting Problem and Brief History                              What would you like to be seen for? (brief description):  Patient was referred for ADHD evaluation by PCP Fanny Martinez at [facility]. Patient has been experiencing symptoms her whole life but her son was recently diagnosed with ADHD so she is wondering if she has it too. Symptoms include disorganization, lack of executive functioning (starting and finishing projects), absent minded, forgetting/losing things, overwhelmed with too many things to do but can't get started on anything. She is currently prescribed celexa for anxiety.   Have you received a mental health diagnosis? Yes   Which one (s): Anxiety  Is there any history of developmental delay?  No   Are you currently seeing a mental health provider?  No            Who / month last seen:    Do you have mental health records elsewhere?  Yes   Will you sign a release so we can obtain them?  Yes    Have you ever been hospitalized for psychiatric reasons?  No  Describe:      Do you have current thoughts of self-harm?  No    Do you currently have thoughts of harming others?  No       Substance Use History     Do you have any history of alcohol / illicit drug use?  No  Describe:    Have you ever received treatment for this?  No    Describe:       Social History     Who is the patient's a guardian?  No    Name / number: =  Have you had an ACT team in last 12 months?  No  Describe:    Do you have any current or past legal issues?  No  Describe:    OK to leave a detailed voicemail?  Yes    Medical/ Surgical History                                   Patient Active Problem List   Diagnosis     Allergic rhinitis     Other type of migraine     iamCERVICAL DISC DISPLACMNT     Nonallopathic lesion of cervical region     Plantar fasciitis, right          Medications              Current Outpatient Medications   Medication Sig Dispense Refill     CHLOROQUINE PHOSPHATE 500 MG OR TABS one tablet weekly starting one week prior to departure and continuing for 4 weeks after return 6 0     ZYRTEC 10 MG OR TABS 1 TABLET DAILY 30 prn         DISPOSITION      Completed phone screen with patient and added to AGE wait list. Expecting approximate 4 month wait list and also gave phone numbers for [facility], and [facility].    Concha Murcia,

## 2021-04-14 ENCOUNTER — VIRTUAL VISIT (OUTPATIENT)
Dept: PSYCHIATRY | Facility: CLINIC | Age: 41
End: 2021-04-14
Attending: PSYCHIATRY & NEUROLOGY
Payer: COMMERCIAL

## 2021-04-14 DIAGNOSIS — R41.840 ATTENTION AND CONCENTRATION DEFICIT: Primary | ICD-10-CM

## 2021-04-14 DIAGNOSIS — F41.1 GENERALIZED ANXIETY DISORDER: ICD-10-CM

## 2021-04-14 PROCEDURE — 90792 PSYCH DIAG EVAL W/MED SRVCS: CPT | Mod: GT | Performed by: STUDENT IN AN ORGANIZED HEALTH CARE EDUCATION/TRAINING PROGRAM

## 2021-04-14 RX ORDER — CITALOPRAM HYDROBROMIDE 20 MG/1
30 TABLET ORAL DAILY
Qty: 45 TABLET | Refills: 1 | Status: SHIPPED | OUTPATIENT
Start: 2021-04-14 | End: 2021-06-22

## 2021-04-14 RX ORDER — CITALOPRAM HYDROBROMIDE 20 MG/1
20 TABLET ORAL
COMMUNITY
Start: 2020-12-01 | End: 2021-04-14

## 2021-04-14 RX ORDER — LEVONORGESTREL / ETHINYL ESTRADIOL AND ETHINYL ESTRADIOL 150-30(84)
1 KIT ORAL
COMMUNITY
Start: 2021-02-17 | End: 2022-02-16

## 2021-04-14 RX ORDER — CHLORAL HYDRATE 500 MG
2 CAPSULE ORAL
COMMUNITY

## 2021-04-14 ASSESSMENT — PAIN SCALES - GENERAL: PAINLEVEL: NO PAIN (0)

## 2021-04-14 NOTE — PROGRESS NOTES
"Video- Visit Details  Type of service:  video visit for diagnostic assessment  Time of service:    Date:  04/14/2021    Video Start Time:  2:00 PM       Video End Time:  3:15 PM    Reason for video visit:  Services only offered telehealth  Originating Site (patient location):  [facility]   Location- Patient's home  Distant Site (provider location):  Remote location  Mode of Communication:  Video Conference via [email or web address]  Consent:  Patient has given verbal consent for video visit?: Yes        [facility]  Psychiatry Clinic  PSYCHIATRY NEW PATIENT EVALUATION     CARE TEAM:    PCP- Clinic, [facility] (Dr. Erica Martinez, ; Dr. Kori Iglesias, )    Angelic Marrero is a 40 year old patient who prefers the name Saba and uses pronouns she, her, hers.     Referred by:  Self   History was provided by: patient who was a good historian.    CHIEF COMPLAINT                                                           \" I would like an evaluation to see if I have have ADHD. \"     PERTINENT BACKGROUND                        [most recent eval 04/14/21]     Previous diagnoses include generalized anxiety disorder and mild major depressive disorder. First experienced symptoms of anxiety throughout her life, but noticeably worsened after birth of her firstborn. She has gotten past psychiatric care through her primary care provider and did not start any psychiatric medications until she finished nursing her youngest child around 2017. She first experienced depression at that time. Most recently, her 2nd oldest (son) was diagnosed with ADHD and responded well to treatment.    Psych pertinent item history includes mutiple psychotropic trials (sertraline, bupropion, citalopram)    HISTORY OF PRESENT ILLNESS                                                            Most recent history began when  - Son was diagnosed with ADHD after formal testing at the [facility] about 1 year ago. Had him evaluated out of " "concern that he was dyslexic. Son also is anxious. He was behind in reading in school, and diagnosis was a surprise.  - Since being on ADHD medication, grades in reading improved.  - This has made Saba wonder if she also has ADHD that has been undiagnosed because she has had difficulty with focus and completion of tasks throughout her life.    Regarding past history.  - In childhood, she notes she was a daydreamer and doodler, but did not struggle with her grades.  - Known amongst her siblings as \"a quitter\". Would get bored easily and leave projects or games they were in the middle of.  - Was also forgetful, which still carries on to this day.  - As a child she notes that she was always anxious. History of physical manifestations, occasionally had GI distress when extremely anxious.  - Had a panic attack only once or twice in her life. None recently.  - No depression reported until about 3-4 years ago (~2017).    - She successfully completed an HIRA.  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 She notes that she also struggled at work, having difficulty starting and completing projects.  - Was difficult to advance career due to her focus/motivation issues.  - After she had 4 kids, she felt she couldn't do it all, so she left her job a few years ago.    - Was first diagnosed with anxiety and depression sometime after she had her first child. Started with anxiety. Worried about \"everything\". Had some insomnia, would wake feeling anxious at night. The sleep issues are reportedly not a current issue, though it still happens 1-2x per week.  - Has had 3-4 therapists in the past but felt she did not connect with any. She is willing to try therapy again.  - Zoloft was helpful. Got rid of majority of symptoms. Lost effectiveness over time.  - Did notice that symptoms got worse when she missed medications.  - Wellbutrin led to severely worsened anxiety.  - Unclear if there were any side effects. Has experienced weight gain, " "loss of libido, but feels it may be related to being in her 40's.    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  - Has trouble getting started on tasks around the home, especially ones that were undesirable.  - Will have laundry pile up after clothes are washed, has difficulty to just keep the kitchen clean. Rest of house is reportedly a mess.  - Projects Saba has needed to do but has difficulty starting include: parsing out clothes for the season for the kids, cleaning bathrooms, completing laundry.  - Does report irritability with kids.  - 13 and 11 year olds are able to help some with putting laundry away.  -  helps as much as he can, but is also busy with working from home and spending time with the kids too.  - Regarding self-care, she gets to workout/go jogging with friends 4x weekly.   - Currently with mood, \"I'm feeling good.\"    - Discussed how anxiety medication treatment may require higher doses of an antidepressant, and she was willing to try an increase in Celexa.  - Was also willing to have formal ADHD testing, which was recommended.    RECENT PSYCH ROS:   Depression:  none reported  Elevated:  none  Psychosis:  none  Anxiety:  excessive worry  Trauma Related:  none  Eating Disorder Symptoms: no    Adverse Effects:  None reported.  Pertinent Negative Symptoms: No self-injurious behavior/urges, suicidal and violent ideation, psychosis, bo or hypomania  Recent Substance Use:     Alcohol- reports drinking 1x drink 3-4x per week  Other illicit drugs- none    FAMILY and SOCIAL HISTORY                                               per pt report           Family Hx:  Mother - depression and alcohol use disorder    Social Hx:  Financial/ Work/School- family or friend, last worked in marketing ([employer]) until 11/2016       Relationships-   Children- yes. 4 Children, ages 13, 11, 8 & 5.      Living situation- Lives in home with  and 4 children "     Social/ Spiritual Support- , friends       Feels Safe at Home- yes   Legal- no   Trauma History (self-report)- no  Early History/Education-  Not fully discussed today. Reported no known developmental milestone issues or difficulty with school that required special education plans. Highest level of education was completion of an HIRA. 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    PAST PSYCHIATRIC HISTORY                           SIB- no  Suicidal Ideation Hx- no  Suicide Attempt- #- no, most recent- N/A    Violence/Aggression Hx- no  Psychosis Hx- no  Eating Disorder Hx- no    Psych Hosp- #- no, most recent- N/A   Commitment- no  ECT- no  Outpatient Programs - yes    PAST MED TRIALS                                              Medication  Dose   (mg) Effect  Dates of Use   citalopram 20 mg helpful current   bupropion 150 mg Worsened anxiety 12/2019 - 12/2019   sertraline 100 - 150 mg Helpful, but effects diminished over time 11/2017 - 7/2019     PAST SUBSTANCE USE HISTORY                      Past Use- Alcohol- reports being a casual drinking. No reported past issues with substance use.  Treatment- #, most recent- N/A  Medical Consequences- N/A  HIV/Hepatitis- N/A  Legal Consequences- N/A    MEDICAL HISTORY  and ALLERGY     ALLERGIES: No known drug allergies     Patient Active Problem List   Diagnosis     Allergic rhinitis     Other type of migraine     iamCERVICAL DISC DISPLACMNT     Nonallopathic lesion of cervical region     Plantar fasciitis, right         MEDICAL REVIEW OF SYSTEMS                                                          Contraception- Not discussed today.    A comprehensive review of systems was performed and is negative other than noted in the HPI.    MEDICATIONS          Current Outpatient Medications   Medication Sig Dispense Refill     CHLOROQUINE PHOSPHATE 500 MG OR TABS one tablet weekly starting one week prior to departure and continuing  for 4 weeks after return 6 0     ZYRTEC 10 MG OR TABS 1 TABLET DAILY 30 prn     VITALS                                                                                             There were no vitals taken for this visit.     MENTAL STATUS EXAM                                                                 Alertness: alert  and oriented  Appearance: well groomed  Behavior/Demeanor: cooperative, pleasant and calm, with good  eye contact   Speech: normal and regular rate and rhythm  Language: intact and no problems  Psychomotor: normal or unremarkable  Mood: anxious and description consistent with euthymia  Affect: full range; congruent to: mood- yes, content- yes  Thought Process/Associations: unremarkable  Thought Content:  Reports none;  Denies suicidal & violent ideation and delusions  Perception:  Reports none;  Denies hallucinations  Insight: good  Judgment: good  Cognition: (6) oriented: time, person, and place  attention span: intact  concentration: intact  recent memory: intact  remote memory: intact  fund of knowledge: appropriate  Gait and Station: N/A (telehealth)    LABS and DATA     PHQ9 TODAY = not done today  No flowsheet data found.    No lab results found.  No lab results found.    PSYCHOTROPIC DRUG INTERACTIONS     None    MANAGEMENT:  N/A    RISK STATEMENT for SAFETY     Angelic Marrero did not appear to be an imminent safety risk to self or others.    DIAGNOSES                                                                                  Generalized Anxiety Disorder    - Rule-out ADHD  Major Depressive Disorder, in full remission    ASSESSMENT                                                                                   Angelic Marrero is a 40 year old with a past history of depression and anxiety who presents for an ADHD evaluation. Concerns for ADHD were raised after her son was diagnosed with ADHD a year ago, which was a surprise to her. Following his improvement  with medications, she now wonders if she has ADHD and could benefit from medications as well. While she may have some traits that could reflect ADHD-like qualities, historically her high level of functioning throughout her life (having doing well in school as a child and succeeding in completing a masters program without any treatment) suggests that this is not ADHD. Since some traits do appear to be present, formal neuropsychological testing can help clarify the reasons/domains of difficulty with concentration. She was agreeable to this.    In addition, there appears to be a notable component of uncontrolled anxiety which may be the reason for her concentration issues. She does appear to have a lot of stress managing the house for four children, and could benefit from a combination of pharmacologic and psychotherapeutic treatment modalities. Will increase her escitalopram to target anxiety. In addition, we recommended that she look into individual therapy to address her anxious thoughts and to develop time management strategies for the overwhelming amount of work needed around the home. Also discussed hiring additional assistance a home as an option. Depression symptoms appear to be under control. Can consider other medication alternatives for anxiety if it remains unmanageable and if ADHD is ruled out through testing. No other concerns today.    [place] was [place] today:  no controlled substances prescribed.    PLAN                                                                                                 1) Medications  - Increase citalopram to 30 mg daily    2) Therapy  - Recommended, pt aware of Therapist Finder on [email or web address]. Preferred to find outside therapist vs. Join adult therapy waitlist (est wait ~6 mos).    3) Next Due   Labs- none  EKG- Repeat if plan to increase citalopram to 40 mg  Rating scales- PHQ9 and NAYELY-7    4) Referrals  Specialty Psych Program- -ADHD Neuropsychological testing      5) RTC: 2 months    6) Crisis Numbers:   Provided routinely in AVS     After hours:  [phone]      TREATMENT RISK STATEMENT:  The risks, benefits, alternatives and potential adverse effects have been discussed and are understood by the pt. The pt understands the risks of using street drugs or alcohol. There are no medical contraindications, the pt agrees to treatment with the ability to do so. The pt knows to call the clinic for any problems or to access emergency care if needed.  Medical and substance use concerns are documented above.  Psychotropic drug interaction check was done, including changes made today.    PROVIDER: Omar Brown MD    Patient staffed in clinic with Dr. Hall who will sign the note.  Supervisor is Dr. Sanford.  TELEHEALTH ATTENDING ATTESTATION  Following the ACGME guidelines on telemedicine and direct supervision due to COVID-19, I was concurrently participating in and/or monitoring the patient care through appropriate telecommunication technology.  I discussed the key portions of the service with the resident, including the mental status examination and developing the plan of care. I reviewed key portions of the history with the resident. I agree with the findings and plan as documented in this note.   Guy Hall MD

## 2021-04-14 NOTE — PATIENT INSTRUCTIONS
**For crisis resources, please see the information at the end of this document**     Patient Education      Thank you for coming to the [facility].    Lab Testing:  If you had lab testing today and your results are reassuring or normal they will be mailed to you or sent through [employer] within 7 days. If the lab tests need quick action we will call you with the results. The phone number we will call with results is # [phone] (home) . If this is not the best number please call our clinic and change the number.    Medication Refills:  If you need any refills please call your pharmacy and they will contact us. Our fax number for refills is [phone]. Please allow three business for refill processing. If you need to  your refill at a new pharmacy, please contact the new pharmacy directly. The new pharmacy will help you get your medications transferred.     Scheduling:  If you have any concerns about today's visit or wish to schedule another appointment please call our office during normal business hours [phone] (8-5:00 M-F)    Contact Us:  Please call [phone] during business hours (8-5:00 M-F).  If after clinic hours, or on the weekend, please call  [phone].    Financial Assistance [phone]  [facility] Billing [phone]  Central Billing Office, [facility]: [phone]  [facility] Billing [phone]  Medical Records [phone]  [facility] Patient Bill of Rights [email or web address]       MENTAL HEALTH CRISIS NUMBERS:  For a medical emergency please call  911 or go to the nearest ER.     [facility]:    -[phone]   Crisis Residence [facility]idence -[phone]   Walk-In Counseling Center [facility] -[phone]   COPE 24/7 [facility] Mobile Team -[phone] (adults)/[phone] (child)  CHILD: [facility] needs assessment  team - [phone]      [facility]:   [facility] - [phone]   Walk-in counseling [facility] - [phone]   Walk-in counseling  - [phone]   Crisis Residence [facility] Residence - [phone]  Urgent Care Adult Mental [facility]-[phone] mobile unit/ 24/7 crisis line    National Crisis Numbers:   National Suicide Prevention Lifeline: 4-597-682-TALK (566-280-3066)  Poison Control Center - 3-987-839-2779  [employer]/resources for a list of additional resources (SOS)  Trans Lifeline a hotline for transgender people 1-419.762.4213  The [employer] Project a hotline for LGBT youth 3-996-606-5003  Crisis Text Line: For any crisis 24/7   To: 097510  see www.crisistextline.org  - IF MAKING A CALL FEELS TOO HARD, send a text!         Again thank you for choosing [facility] and please let us know how we can best partner with you to improve you and your family's health.    You may be receiving a survey regarding this appointment. We would love to have your feedback, both positive and negative. The survey is done by an external company, so your answers are anonymous.

## 2021-04-14 NOTE — PROGRESS NOTES
"VIDEO VISIT  Angelic Marrero is a 40 year old patient who is being evaluated via a billable video visit.      The patient has been notified of following:   \"This video visit will be conducted via a call between you and your physician/provider. We have found that certain health care needs can be provided without the need for an in-person physical exam. This service lets us provide the care you need with a video conversation. If a prescription is necessary we can send it directly to your pharmacy. If lab work is needed we can place an order for that and you can then stop by our lab to have the test done at a later time. Insurers are generally covering virtual visits as they would in-office visits so billing should not be different than normal.  If for some reason you do get billed incorrectly, you should contact the billing office to correct it and that number is in the AVS .    Video Conference to be completed via:  [place]    Patient has given verbal consent for video visit?:  Yes    Patient would prefer that any video invitations be sent by: Send to e-mail at: [email or web address]      How would patient like to obtain AVS?:  Andry    AVS SmartPhrase [PsychAVS] has been placed in 'Patient Instructions':  Yes  "

## 2021-05-26 ASSESSMENT — PATIENT HEALTH QUESTIONNAIRE - PHQ9: SUM OF ALL RESPONSES TO PHQ QUESTIONS 1-9: 9

## 2021-05-27 ENCOUNTER — VIRTUAL VISIT (OUTPATIENT)
Dept: PSYCHOLOGY | Facility: CLINIC | Age: 41
End: 2021-05-27
Payer: COMMERCIAL

## 2021-05-27 DIAGNOSIS — F41.9 ANXIETY: Primary | ICD-10-CM

## 2021-05-27 PROCEDURE — 90791 PSYCH DIAGNOSTIC EVALUATION: CPT | Mod: 95 | Performed by: PSYCHOLOGIST

## 2021-05-27 ASSESSMENT — PATIENT HEALTH QUESTIONNAIRE - PHQ9
SUM OF ALL RESPONSES TO PHQ QUESTIONS 1-9: 6
SUM OF ALL RESPONSES TO PHQ QUESTIONS 1-9: 1
SUM OF ALL RESPONSES TO PHQ QUESTIONS 1-9: 3

## 2021-05-27 ASSESSMENT — COLUMBIA-SUICIDE SEVERITY RATING SCALE - C-SSRS
1. IN THE PAST MONTH, HAVE YOU WISHED YOU WERE DEAD OR WISHED YOU COULD GO TO SLEEP AND NOT WAKE UP?: NO
2. HAVE YOU ACTUALLY HAD ANY THOUGHTS OF KILLING YOURSELF LIFETIME?: NO
1. IN THE PAST MONTH, HAVE YOU WISHED YOU WERE DEAD OR WISHED YOU COULD GO TO SLEEP AND NOT WAKE UP?: NO
2. HAVE YOU ACTUALLY HAD ANY THOUGHTS OF KILLING YOURSELF?: NO

## 2021-05-27 NOTE — PROGRESS NOTES
[facility]   Provider Name:  Janna York     Credentials:  PhD, LP    PATIENT'S NAME: Angelic Marrero  PREFERRED NAME: Saba   PRONOUNS: She/Her  MRN: [id number]  : 1980  ADDRESS: [address] [place] [address] [address]  [place] [address]  ACCT. NUMBER:  [id number]  DATE OF SERVICE: 21  START TIME: 9:00  END TIME: 9:33  PREFERRED PHONE: [phone]  May we leave a program related message: Yes  SERVICE MODALITY:  Video Visit:      Provider verified identity through the following two step process.  Patient provided:  Patient     Telemedicine Visit: The patient's condition can be safely assessed and treated via synchronous audio and visual telemedicine encounter.      Reason for Telemedicine Visit: Services only offered telehealth    Originating Site (Patient Location): Patient's home    Distant Site (Provider Location): Provider Home Office    Consent:  The patient/guardian has verbally consented to: the potential risks and benefits of telemedicine (video visit) versus in person care; bill my insurance or make self-payment for services provided; and responsibility for payment of non-covered services.     Patient would like the video invitation sent by:  My Chart    Mode of Communication:  Video Conference via well    As the provider I attest to compliance with applicable laws and regulations related to telemedicine.    UNIVERSAL ADULT Mental Health DIAGNOSTIC ASSESSMENT    Identifying Information:  Patient is a 40 year old,   female.  The pronoun use throughout this assessment reflects the patient's chosen pronoun.  Patient was referred for an assessment by primary care provider .  Patient attended the session alone.     Chief Complaint:   The purpose of this evaluation is to: provide treatment recommendations and clarify diagnosis. Patient reported seeking services at this time for diagnostic assessment and recommendations for treatment.  Patient reported that she has not completed a  "previous ADHD diagnostic assessment. Patient has not received a previous diagnosis of ADHD. Patient reported that medication has not been prescribed medication to address these problems.  She struggles with starting chores and getting started. She often feels \"frozen.\" She doesn't finish things (\"this has been my whole life\"). She feels that trying to keep her house in order is a challenge. She can't keep up with tasks and would rather sit. It can feel overwhelming and she doesn't know where to start. She is disorganized and can't find things; she misplaces and loses things. Each surface of the home is covered in \"stuff.\" She can't find her keys, phone, or wallet. She has locked her keys in her car so often that she had to get AAA because she was using road side assistance so much that her insurance rates were going up. She noted that her  is a complete \"slob\" and struggles with the same issues. She is forgetful regarding bills (uses auto pay), appointments, childrens activities. She tries to use reminders in her phone so that she doesn't miss things. She might zone out during conversations and has difficulty listening. She is fidgety and restless and feels she needs to be doing something constantly. If she is trying to relax she is doing dishes or shopping for groceries or looking at something on her phone. She is always thinking about the next thing she needs to be researching or buying, etc. Client feels that she is feeling overloaded since being in charge of the household and managing her kids. 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 She felt she didn't have a good strategy for managing task lists. She would put things to the bottom of the list and " "never get to them. She was good at her job but felt she wasn't organized. She felt overwhelmed there too.      Social/Family History:  Patient reported they grew up in [place], [place]. She was raised by her  biological parents.  Parents  when she was in second grade. This was a surprise and was a \"huge loss.\" She always wished it was different. This was \"really hard\" and she had a lot of separation anxiety when she had to leave her mother when she had to go to her dad's house. She also didn't like going to sleep overs or being away from her mother. She was the second born of four children. She has one older brother and a younger brother and a younger sister. Patient reported that their childhood was \"good.\" She felt safe and loved and supported. She recalls being alone a lot after school (\"latch key kid, watching a lot of TV). She was actively involved in sports and was in something each season.  She spent a lot of time with her aunt because she disliked being with her dad (\"he was a nice flori and didn't do anything wrong but it just wasn't the same as being with my mom\"). Patient described their current relationships with family of origin as close with parents and sister.      The patient describes their cultural background as American. Cultural influences and impact on patient's life structure, values, norms, and healthcare: Racial or Ethnic Self-Identification identifies as white.  Contextual influences on patient's health include: Family Factors has 4 children with MH issues. These factors will be addressed in the Preliminary Treatment plan.  Patient identified their preferred language to be English. Patient reported they does not need the assistance of an  or other support involved in therapy.     Patient reported had no significant delays in developmental tasks.  Patient's highest education level was graduate school (Masters of Business). Patient identified the following learning problems: none " reported.  Modifications will not be used to assist communication in therapy.  Patient reports they are able to understand written materials.    Patient reported the following relationship history: one marriage. Patient's current relationship status is  for 16 years. They have dated since Client was 15 years old.  Patient identified their sexual orientation as heterosexual.  Patient reported having three children ages 13 (son), 10 (son), 8 (daughter), and 5 (son). Her son is 10 and has been diagnosed with ADHD and anxiety. Three of her kids are on mediations for anxiety. Patient identified mother, siblings and spouse as part of their support system.  Patient identified the quality of these relationships as stable and meaningful.      Patient's current living/housing situation involves staying in own home/apartment.  They live with her  and 4 children and they report that housing is stable.     Patient is currently stays at home with her children.  Patient reports their finances are obtained through spouse.  Patient does not identify finances as a current stressor.      Patient reported that they have not been involved with the legal system.   Patient denies being on probation / parole / under the jurisdiction of the court.      Personal and Family Medical History:   Patient does report a family history of mental health concerns. Patient reports family history includes Allergies in her brother, father, mother, and sister; Attention Deficit Disorder in her brother and son; Circulatory in her paternal grandmother; Depression in her mother; Diabetes in her paternal grandfather; Lipids in her father; Prostate Cancer in her maternal grandfather and paternal grandfather; Substance Abuse in her mother. Three of Client's kids are on medications for anxiety.    Patient does report Mental Health Diagnosis and/or Treatment. Patient reported the following previous diagnoses which include(s): an Anxiety Disorder and  "Depression.  Patient reported symptoms began in childhood (she felt anxious/worried/nervous a lot as a kid). Client has been treating these conditions since having children. She had her fourth child in 2016 and noted that she started to treat anxiety at this time. She thinks depression started after this. Patient has received mental health services in the past: medication from PCP and counseling. Client worked with two therapists and never felt she made progress or \"clicked\" with them. The most recent therapy was about 3 years ago. She felt medication was sufficient. Psychiatric Hospitalizations: None.  Patient denies a history of civil commitment. Currently, patient is receiving other mental health services.  These include medication from PCP. Client is prescribed Celexa by PCP. Client feels this is working okay for her at this time.    Patient has had a physical exam to rule out medical causes for current symptoms.  Date of last physical exam was within the past year. Client was encouraged to follow up with PCP if symptoms were to develop. The patient has a [facility] Provider, who is [facility]..  Patient reports no current medical concerns.  Patient denies any issues with pain..   There are not significant appetite / nutritional concerns / weight changes.   Patient does not report a history of head injury / trauma / cognitive impairment.     Patient reports current meds as:   Outpatient Medications Marked as Taking for the 5/27/21 encounter (Appointment) with Janna York, PhD   Medication Sig     CALCIUM PO      CHLOROQUINE PHOSPHATE 500 MG OR TABS one tablet weekly starting one week prior to departure and continuing for 4 weeks after return     Cholecalciferol (VITAMIN D3 PO)      citalopram (CELEXA) 20 MG tablet Take 1.5 tablets (30 mg) by mouth daily     Ferrous Sulfate (IRON PO)      levonorgest-eth estrad 91-Day (SEASONIQUE) 0.15-0.03 &0.01 MG tablet Take 1 " tablet by mouth     Multiple Vitamin (MULTIVITAMIN ADULT PO)      Omega-3 1000 MG capsule Take 2 g by mouth     ZYRTEC 10 MG OR TABS 1 TABLET DAILY       Medication Adherence:  Patient reports taking prescribed medications as prescribed.    Patient Allergies:    Allergies   Allergen Reactions     No Known Drug Allergies        Medical History:    Past Medical History:   Diagnosis Date     Allergic rhinitis, cause unspecified     AIT     Other forms of migraine, without mention of intractable migraine without mention of status migrainosus          Current Mental Status Exam:   Appearance:  Appropriate    Eye Contact:  Good   Psychomotor:  Normal       Gait / station:  no problem  Attitude / Demeanor: Cooperative   Speech      Rate / Production: Normal/ Responsive      Volume:  Normal  volume      Language:  no problems and good  Mood:   Normal  Affect:   Appropriate    Thought Content: Clear   Thought Process: Goal Directed  Logical       Associations: No loosening of associations  Insight:   Good   Judgment:  Intact   Orientation:  All  Attention/concentration: Good    Rating Scales:    PHQ9:    PHQ-9 SCORE 5/27/2020 12/1/2020 5/19/2021   PHQ-9 Total Score MyChart - - 5 (Mild depression)   PHQ-9 Total Score 3 1 -   Some encounter information is confidential and restricted. Go to Review [employer] activity to see all data.   ;    GAD7:    NAYELY-7 SCORE 5/27/2020 12/1/2020 5/19/2021   Total Score - - 4 (minimal anxiety)   Total Score 1 3 -   Some encounter information is confidential and restricted. Go to Review [employer] activity to see all data.     CGI:     First:Considering your total clinical experience with this particular patient population, how severe are the patient's symptoms at this time?: 3 (5/27/2021  9:31 AM)        Substance Use:  Patient did report a family history of substance use concerns; see medical history section for details.  Her mother is a recovering alcoholic. She has been sober for 10 years.  She noted that her mother was never abusive or neglectful but was just drinking too much. Patient has not received chemical dependency treatment in the past.  Patient has not ever been to detox.      Patient is not currently receiving any chemical dependency treatment. Patient reported the following problems as a result of their substance use:  none reported.    Patient reports using alcohol 1-5 times per week and has 1-2 glasses of wine at a time. Patient first started drinking at age 17.  Patient reported date of last use was last week.  Patient reports heaviest use was in college.  Patient denies using tobacco.  Patient denies using cannabis.  Patient reports using caffeine 1 times per day and drinks 2-3 at a time. Patient started using caffeine at age 22. Client can't drink it past noon or she can't sleep at night.   Patient reports using/abusing the following substance(s). Patient reported no other substance use.     CAGE- AID:  No flowsheet data found.    Substance Use: No symptoms    Based on the negative CAGE score and clinical interview there  are not indications of drug or alcohol abuse.      Significant Losses / Trauma / Abuse / Neglect Issues:   Patient   did not serve in the .  There are indications or report of significant loss, trauma, abuse or neglect issues related to: divorce / relational changes parents  when she was in 2nd grade.    Concerns for possible neglect are not present.     Safety Assessment:   Current Safety Concerns:  [place] Suicide Severity Rating Scale (Lifetime/Recent)  [place] Suicide Severity Rating (Lifetime/Recent) 5/27/2021   1. Wish to be Dead (Lifetime) No   1. Wish to be Dead (Recent) No   2. Non-Specific Active Suicidal Thoughts (Lifetime) No   2. Non-Specific Active Suicidal Thoughts (Recent) No       Patient denies current homicidal ideation and behaviors.  Patient denies current self-injurious ideation and behaviors.    Patient denied risk behaviors  associated with substance use.  Patient denies any high risk behaviors associated with mental health symptoms.  Patient reports the following current concerns for their personal safety: None.  Patient reports there are not firearms in the house.         History of Safety Concerns:  Patient denied a history of homicidal ideation.     Patient denied a history of personal safety concerns.    Patient denied a history of assaultive behaviors.    Patient denied a history of sexual assault behaviors.     Patient denied a history of risk behaviors associated with substance use.  Patient denies any history of high risk behaviors associated with mental health symptoms.    Risk Plan:  See Recommendations for Safety and Risk Management Plan    Review of Symptoms per patient report:  Depression: Lack of interest, Change in energy level, Difficulties concentrating and Feeling sad, down, or depressed  Thu:  No Symptoms  Psychosis: No Symptoms  Anxiety: Excessive worry, Nervousness and Irritability  Panic:  No symptoms  Post Traumatic Stress Disorder:  No Symptoms   Eating Disorder: No Symptoms   ADD / ADHD:  Inattentive, Poor task completion, Poor organizational skills, Distractibility and Forgetful  Conduct Disorder: No symptoms  Autism Spectrum Disorder: No symptoms  Obsessive Compulsive Disorder: No Symptoms    Patient reports the following compulsive behaviors and treatment history: none reported.      Diagnostic Criteria:   The client does not report enough symptoms for the full criteria of any specific Anxiety Disorder to have been met   - Excessive anxiety and worry about a number of events or activities (such as work or school performance).    - Restlessness or feeling keyed up or on edge.    - Difficulty concentrating or mind going blank.    - Irritability.     Functional Status:  Patient reports the following functional impairments: management of the household and or completion of tasks and work / vocational  responsibilities.     WHODAS:   WHODAS 2.0 Total Score 5/19/2021   Total Score MyChart 18   Some encounter information is confidential and restricted. Go to Review Flowsheets activity to see all data.         Clinical Summary:  1. Reason for assessment: ADHD Evaluation  .  2. 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  3. Principal DSM5 Diagnoses  (Sustained by DSM5 Criteria Listed Above):   300.09 (F41.8) Other Specified Anxiety Disorder .  RULE OUT: ADHD  6. Prognosis: Expect Improvement and Maintain Current Status / Prevent Deterioration.  7. Likely consequences of symptoms if not treated: issues at home with family (task completion).  8. Client strengths include:  educated, goal-focused, has a previous history of therapy, insightful, intelligent, motivated, open to learning, open to suggestions / feedback, responsible parent, support of family, friends and providers, supportive, wants to learn, willing to ask questions and willing to relate to others .     Recommendations:     1. Plan for Safety and Risk Management:   Recommended that patient call 911 or go to the local ED should there be a change in any of these risk factors..          Report to child / adult protection services was NA.      4. Resources/Service Plan:    services are not indicated.   Modifications to assist communication are not indicated.   Additional disability accommodations are not indicated.      5. Collaboration:   Collaboration / coordination of treatment will be initiated with the following  support professionals: primary care physician.      6.  Referrals:   The following referral(s) will be initiated: NA. Next Scheduled Appointment: NA.     A Release of Information has been obtained for the following: NA.    7. JENI:    JENI:  Discussed the general effects of drugs and alcohol on health and well-being. Provider gave patient printed information about the effects of chemical use on their  health and well being. Recommendations:  NA .     8. Records:   These were reviewed at time of assessment.   Information in this assessment was obtained from the medical record and  provided by patient who is a good historian.   Patient will have open access to their mental health medical record.      Provider Name/ Credentials:  Janna York, PhD, LP  May 27, 2021

## 2021-05-28 ASSESSMENT — ANXIETY QUESTIONNAIRES
GAD7 TOTAL SCORE: 3
GAD7 TOTAL SCORE: 4
GAD7 TOTAL SCORE: 3
GAD7 TOTAL SCORE: 1

## 2021-05-30 NOTE — PATIENT INSTRUCTIONS - HE
Taper:    100mg for 3 days  Then 50 mg for 3 days  Then stop and start Celexa   1/2 a tab for three days and then 1 full tab

## 2021-05-30 NOTE — PROGRESS NOTES
Assessment / Impression     1. Mild episode of recurrent major depressive disorder (H)  citalopram (CELEXA) 20 MG tablet       Plan:     1.  Reviewed with patient all of her options and she is elected to try tapering off of her Zoloft and starting Celexa 20 mg daily.  A taper schedule was reviewed with her and printed for her and her after visit summary.  We discussed common side effects of this new medication and reviewed with her the safety profile while breast-feeding.  She will follow-up with me in 4 to 6 weeks for further titration as needed.    Subjective:      HPI: Angelic Marrero is a 38 y.o. female with depression and anxiety is here today for follow-up.  This is an otherwise healthy 38-year-old female who reports in 2017 she was first diagnosed with depression and anxiety.  At the time she was placed on Zoloft and titrated in October up to 150 mg daily.  In general she reports significant improvement on this medication with feelings of less irritability and more mood stability in general.  This medication was chosen as she is still intermittently proximal me 2-3 times a day is breast-feeding her 3-year-old son.  She does not know when she is going to stop breast-feeding but reports is very minimal amount and she is not even certain if she is making any milk.  She has gained approximately 10 pounds of weight since she first started on this in 2017 and thus is interested in discussing other medication options.  She never previously been treated for depression and anxiety in the past.  She states she has a good social support system and denies any symptoms of suicidal or homicidal ideations.  She is worried about feelings of anxiety returning as she states in general the Zoloft is very much help with that.  No other questions or concerns today.  She is a stay-at-home mom.  Non-smoker.      Review of Systems  Pertinent items are noted in HPI.       Objective:     /62 (Patient Site: Left Arm, Patient  "Position: Sitting, Cuff Size: Adult Regular)   Pulse 60   Temp 98.3  F (36.8  C) (Oral)   Resp 12   Ht 5' 4\" (1.626 m)   Wt 138 lb 3.2 oz (62.7 kg)   LMP 06/21/2019 (Exact Date)   Breastfeeding? Yes Comment: a 3 year old child, minimally  BMI 23.72 kg/m    Physical Examination: General appearance - alert, well appearing, and in no distress  Psychiatric: Normal affect. Does not appear anxious or depressed.  No hallucinations, suicidal ideations, or delusions.  Phq-9=7  NAYELY=4.  The rest of the entire 30 minute visit greater than 50% of our visit was spent face to face counseling treatment of depression and anxiety.   "

## 2021-05-31 VITALS — BODY MASS INDEX: 21.46 KG/M2 | WEIGHT: 125 LBS

## 2021-05-31 VITALS — WEIGHT: 127 LBS | BODY MASS INDEX: 21.68 KG/M2 | HEIGHT: 64 IN

## 2021-05-31 VITALS — BODY MASS INDEX: 21.65 KG/M2 | HEIGHT: 64 IN | WEIGHT: 126.8 LBS

## 2021-05-31 VITALS — WEIGHT: 124.7 LBS | BODY MASS INDEX: 21.29 KG/M2 | HEIGHT: 64 IN

## 2021-05-31 VITALS — HEIGHT: 64 IN | WEIGHT: 122.3 LBS | BODY MASS INDEX: 20.88 KG/M2

## 2021-05-31 NOTE — PROGRESS NOTES
"Assessment / Impression     1. Mild episode of recurrent major depressive disorder (H)  citalopram (CELEXA) 20 MG tablet       Plan:     1.  Patient is doing very well on her new dose of Celexa 20 mg daily.  She will continue with this and refills were provided.  Encouraged face-to-face visits every 6 months.    Subjective:      HPI: Angelic Marrero is a 38 y.o. female with recurrent major depression is here today for medication follow-up.  Patient reports be doing very well on her Celexa 20 mg daily.  She reports she had no problem switching from Zoloft to Celexa with her taper schedule.  She states she did have some decreased appetite for couple of weeks but that is resolved.  She also reports that her mood is significantly improved and even her spouse is noticed this.  She denies any bo, suicidal, or homicidal ideations.  In general she gets a good activity levels and denies any weight gain.  She would like to continue with this medication.  She continues to work on weaning her breast-feeding son.      Review of Systems  Pertinent items are noted in HPI.       Objective:     BP 98/60 (Patient Site: Left Arm, Patient Position: Sitting, Cuff Size: Adult Regular)   Pulse 78   Temp 97.8  F (36.6  C) (Oral)   Resp 14   Ht 5' 4\" (1.626 m)   Wt 139 lb 12.8 oz (63.4 kg)   LMP 07/27/2019 (Approximate)   Breastfeeding? Yes   BMI 24.00 kg/m    Physical Examination: General appearance - alert, well appearing, and in no distress  Psychiatric: Normal affect. Does not appear anxious or depressed.  No hallucinations, suicidal or homicidal ideations.  Phq-9=2.  "

## 2021-05-31 NOTE — PROGRESS NOTES
Assessment:     1.  Well woman exam  2.  Major depressive disorder, stable on Celexa  3.  Anxiety, stable on Celexa  4.  Breast-feeding  5.  Stress urinary incontinence     Plan:      1. Discussed nutrition and exercise.  Advised MVI, Vitamin D3 4000IU geltab and an omega 3 supplement daily   2. Blood tests: declines all HM /screening labs  3. Breast self exam technique reviewed and patient encouraged to perform self-exam monthly.   4. Contraception: Combined oral contraceptive pills  5.  Next pap due 2020. HPV co-testing discussed with that pap, then paps q 5 yrs if both negative.  6.  Referred to pelvic floor physical therapy.  7.  RTC 1 year for annual physical exam, PRN    Subjective:      Angelic Marrero is a 38 y.o. female who presents for an annual exam.     Healthy Habits:   Regular Exercise: No   Healthy Diet: Yes  Dental Visits Regularly: Yes  Seat Belt: Yes  Sexually active: Yes  STI risk No  Self Breast Exam Monthly:Yes      Immunization History   Administered Date(s) Administered     Hep A, Adult IM (19yr & older) 2007     Hep A, historic 2007     Influenza, inj, historic,unspecified 10/13/2010, 10/04/2017, 10/25/2018     Influenza, seasonal,quad inj 36+ mos 10/04/2017     Influenza,seasonal quad, PF, 36+MOS 2014, 2015, 10/11/2016     Influenza,seasonal, Inj IIV3 10/13/2010     Rho (D) Immune Globulin 2013     Tdap 2016     Immunization status: up to date and documented.    Gynecologic History  Patient's last menstrual period was 2019 (approximate).  Contraception: OCP (estrogen/progesterone)    Cancer screening:   Last Pap: August 10, 2015. Results were: normal  Last mammogram: Never    OB History    Para Term  AB Living   4 4 4 0 0 4   SAB TAB Ectopic Multiple Live Births   0 0 0 0 4      # Outcome Date GA Lbr Jordy/2nd Weight Sex Delivery Anes PTL Lv   4 Term 16 40w0d 05:05 / 00:18 7 lb 3.9 oz (3.285 kg) M Vag-Spont EPI N REYNA   3  Term 13 39w5d 06:00 / 00:10 7 lb 12 oz (3.515 kg) F Vag-Spont EPI N REYNA      Birth Comments: SP separation, no other complications, BF x 27 mos   2 Term 08/18/10 37w4d 06:00 / 00:10 7 lb 12 oz (3.515 kg) M Vag-Spont None N REYNA      Birth Comments: no complication, BF x 27mos   1 Term 08 40w0d 06:00 / 00:10 7 lb 12 oz (3.515 kg) M Vag-Spont EPI N REYNA      Birth Comments: no compliations, lac with rep; BF x 9 mos.       Current Outpatient Medications   Medication Sig Dispense Refill     CALCIUM CARBONATE (CALCIUM 500 ORAL) Take by mouth.       cholecalciferol, vitamin D3, (VITAMIN D3 ORAL) Take by mouth.       citalopram (CELEXA) 20 MG tablet Take 1 tablet (20 mg total) by mouth daily. 90 tablet 3     FERROUS SULFATE (SLOW FE ORAL) Take by mouth daily.       multivit with calcium,iron,min (WOMEN'S DAILY MULTIVITAMIN ORAL) Take by mouth.       norgestimate-ethinyl estradiol (ORTHO-CYCLEN, 28,) 0.25-35 mg-mcg per tablet Take 1 tablet by mouth daily. 3 Package 3     OMEGA-3/DHA/EPA/FISH OIL (FISH OIL-OMEGA-3 FATTY ACIDS) 300-1,000 mg capsule Take 2 g by mouth daily.       No current facility-administered medications for this visit.      Past Medical History:   Diagnosis Date     Anxiety     Saw therapist at  in [place] over summer 2015.  Doing well now.  Also some marriage therpay in the past.  Tried prozac last spring and another med and didn't like the side effects -- stopped within 1 mo of starting.      Menstrual migraine      Rh negative state in antepartum period 2016    Rhophylac given 3/7/16     Seasonal allergies      Varicella      Past Surgical History:   Procedure Laterality Date     WISDOM TOOTH EXTRACTION      ~18 or 20.  No complications     Patient has no known allergies.  Family History   Problem Relation Age of Onset     Cancer Maternal Grandfather         Lung cancer.  Hx of smoking.  Dx 70 or so, and .      Cancer Paternal Grandfather         Pancreatic cancer.  Dx late 60s or  early 70s.   from this.      Aneurysm Paternal Grandmother          70s from this     Depression Mother         Med managed and therapy.  Hx of hospitalization in the past with this.      Thyroid disease Son 4     Hyperlipidemia Brother      Depression Brother      Depression Sister      Breast cancer Neg Hx      Colon cancer Neg Hx      Heart disease Neg Hx      Social History     Socioeconomic History     Marital status:      Spouse name: Sudeep     Number of children: 3     Years of education: HIRA     Highest education level: Not on file   Occupational History     Occupation:      Employer: [employer]     Comment: [employer]. 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)   Social Needs     Financial resource strain: Not on file     Food insecurity:     Worry: Not on file     Inability: Not on file     Transportation needs:     Medical: Not on file     Non-medical: Not on file   Tobacco Use     Smoking status: Never Smoker     Smokeless tobacco: Never Used   Substance and Sexual Activity     Alcohol use: Yes     Alcohol/week: 1.5 oz     Types: 3 Standard drinks or equivalent per week     Drug use: No     Sexual activity: Yes     Partners: Male     Birth control/protection: OCP     Comment:  Sudeep   Lifestyle     Physical activity:     Days per week: Not on file     Minutes per session: Not on file     Stress: Not on file   Relationships     Social connections:     Talks on phone: Not on file     Gets together: Not on file     Attends Ep[employer] service: Not on file     Active member of club or organization: Not on file     Attends meetings of clubs or organizations: Not on file     Relationship status: Not on file     Intimate partner violence:     Fear of current or ex partner: Not on file     Emotionally abused: Not on file     Physically abused: Not on file     Forced sexual activity: Not on file   Other Topics Concern     Not on file   Social History Narrative     Not on  "file       Review of Systems  General:  Denies problem  Eyes: Denies problem  Ears/Nose/Throat: Denies problem  Cardiovascular: Denies problem  Respiratory:  Denies problem  Gastrointestinal:  denies problems  Genitourinary: denies problems  Musculoskeletal:  Denies problem  Skin: Denies problem  Neurologic:denies problems  Psychiatric: denies problems  Endocrine: denies problems        Objective:         Vitals:    08/01/19 1530   BP: 98/58   Pulse: 64   Weight: 139 lb (63 kg)   Height: 5' 4\" (1.626 m)       Physical Exam:  General Appearance: Alert, cooperative, no distress, appears stated age  Skin: Skin color, texture, turgor normal, no rashes or lesions  Throat: Lips, mucosa, and tongue normal; teeth and gums normal  HEENT: grossly normal; otoscopic and opthalmic exam not performed.   Neck: Supple, symmetrical, trachea midline, no adenopathy;  thyroid: not enlarged, symmetric, no tenderness/mass/nodules  Lungs: Clear to auscultation bilaterally, respirations unlabored  Breasts: No breast masses, tenderness, asymmetry, or nipple discharge.  Heart: Regular rate and rhythm, S1 and S2 normal, no murmur  Abdomen: Soft, non-tender. No organomegaly or masses  Pelvic:External genitalia normal without lesions or irritation. Vagina and cervix show no lesions, inflammation, discharge or tenderness. No cystocele, No rectocele. Uterus & adnexal normal without masses or tenderness.       "

## 2021-06-01 VITALS — WEIGHT: 135.4 LBS | HEIGHT: 64 IN | BODY MASS INDEX: 23.12 KG/M2

## 2021-06-01 NOTE — PROGRESS NOTES
[facility]   Pelvic Initial Evaluation    Patient Name: Angelic Marrero  Date of evaluation: 9/17/2019  Referral Diagnosis: Stress incontinence  Referring provider: Tammy Brennan A*  Visit Diagnosis:     ICD-10-CM    1. Stress incontinence N39.3        Assessment:       Angelic is a 39 year old female who began to experience loss of urinary continence the summer of 2016, after the birth of her fourth child in May. Patient reports her biggest functional deficits occur when jump, sneezing and running, and it is more common at these times she might experience loss of urine. Evaluation today shows with use of sEMG she has mild weakness and moderate endurance loss in the pelvic floor muscles.  Pt. is a good candidate for skilled PT services to improve pain levels and function.    Goals:  No data recorded  Patient will experience no leakage with coughing, sneezing, jumping : in 8-10 weeks;Comment  Comment: running      Patient's expectations/goals are realistic.    Barriers to Learning or Achieving Goals:  No Barriers.    Goals an plan of care were set in collaboration with patient. Plan of care is dynamic and will be modified on an ongoing basis.       Plan / Patient Instructions:        Plan of Care:   Authorization / Certification Start Date: 09/17/19  Authorization / Certification End Date: 12/17/19  Authorization / Certification Number of Visits: 3-4  Communication with: Referral Source  Patient Related Instruction: Nature of Condition;Treatment plan and rationale;Self Care instruction;Basis of treatment;Body mechanics;Precautions;Expected outcome  Times per Week: 1x/month  Number of Weeks: 12  Number of Visits: 4  Discharge Planning: Home exercise program  Precautions / Restrictions : None known  Therapeutic Exercise: Pelvic Floor retraining  Functional Training (ADL's): self care;ADL's;ergonomics;other  Functional Training: return to running advice/suggestions      Plan for next visit:  Recheck pelvic floor strength with sEMG in 4-5 weeks     Subjective:         Social information:   Living Situation:single family home, lives with others , stairs  with railing and has assistance Yes  and 4 children ages 11 to 3 years old   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 female who presents to therapy today with complaints of stress incontinence 0-2 x/day. Date of onset/duration of symptoms is the summer of 2016 after the birth of her fourth child in May.  Onset was gradual. Symptoms are not improving. She denies history of similar symptoms. She describes her previous level of function as not limited    Number of pregnancies:    4                                             Length of pushing:  Short- 2-3 pushes each kid  Number of vaginal deliveries:   4                                     Number of c-sections:   0    Number of episiotomies:0  History of Urinary Tract Infections:  No    Bowel and Bladder Patterns:  Voiding frequency # times/day:  6  Voiding frequency # times/night   1  Incontinence # of episodes/day:0-2  Incontinence # of episodes/night:   0  Amount of urine lost:  small  Self rating of severity on Incontinence:    2/10    Bowel movement frequency:   1 per day  Do you have bowel incontinence?  No    Fluid Intake:  __6___cups/day           Caffeinated/carbonated beverages /day:  ___2__/day    Pain Ratin  Pain rating at best: 0  Pain rating at worst: 0  Pain description: Patient denies any hip, back, or abdominal/pelvic pain.    Past Surgical History:   Procedure Laterality Date     WISDOM TOOTH EXTRACTION      ~18 or 20.  No complications        Past Medical History:   Diagnosis Date     Anxiety     Saw therapist at  in [place] over summer 2015.  Doing well now.  Also some marriage therpay in the past.  Tried prozac last spring and another med and didn't like the side effects -- stopped  within 1 mo of starting.      Menstrual migraine      Rh negative state in antepartum period 4/20/2016    Rhophylac given 3/7/16     Seasonal allergies      Varicella         Functional limitations are described as occurring with:   Loss of urine 0-2x/day with: laughing, sneezing, coughing, and has not been running or jogging due to stress incontinence    Patient reports she has never received PT before, does not use a pessary.       Objective:      Note: Items left blank indicates the item was not performed or not indicated at the time of the evaluation.    External Exam  Posture Observation:      General sitting posture is  normal.  General standing posture is normal.    Lower Extremity Strength:  Date:      LE strength/5 Right Left Right Left Right Left   Hip Flexion (L1-3)         Hip Extension (L5-S1)         Hip Abduction (L4-5)         Hip Adduction (L2-3)         Hip External Rotation         Hip Internal Rotation         Knee Extension (L3-4)         Knee Flexion         Ankle Dorsiflexion (L4-5)         Great Toe Extension (L5)         Ankle Plantar flexion (S1)         Abdominals         Lumbar ROM:  Date:      *Indicate scale AROM AROM AROM   Lumbar Flexion      Lumbar Extension       Right Left Right Left Right Left   Lumbar Sidebending         Lumbar Rotation         Thoracic Flexion      Thoracic Extension      Thoracic Sidebending         Thoracic Rotation              Skin Integrity:  Introitus normal  Pelvic Clock: no tenderness noted on external palpation.     Internal Exam  Sensation: normal LE and perineal area  Muscle tone: normal  Prolapse: not tested  Strength - Vaginal   MMT: 2   Endurance: 4 sec   Repetitions: 5    Palpation  Pelvis level    Patient consented to use of sEMG to manpreet rectal area to guide best pelvic floor contraction and in what position she performs the contraction best.  Supine: legs extended or flexed 15- 21 mA  Sitting 7-9 mA  Standing 11-15 mA    Treatment Today      TREATMENT MINUTES COMMENTS   Evaluation 25 Pelvic Floor   Self-care/ Home management     Manual therapy     Neuromuscular Re-education     Therapeutic Activity     Therapeutic Exercises 30 Instructed patient in Kegel's long and short contractions. We also discussed options for a graded return to running (either 10 minutes straight jogging, or walk 3 min, jog 30 sec for 15 to 20 minutes), suggested shortened stride to decrease impact  Exercises:  Exercise #1: Short Kegel's- 1 second  Comment #1: 5 reps- 4x/day for a total of 20 reps  Exercise #2: Long Kegel's- 6-10 seconds  Comment #2: 6-10 reps, 2x/day, patient to start with 6x6 and increase by 1 second and 1 rep/week until she reaches 10 seconds for 10 reps  Exercise #3: Knaack contraction  Comment #3: 1 contraction /day- 20-40 seconds      Gait training     Modality__________________                Total 55    Blank areas are intentional and mean the treatment did not include these items.     PT Evaluation Code: (Please list factors)  Patient History/Comorbidities: 4 live deliveries   Examination: Pelvic Floor weakness/ Stress Incontinence  Clinical Presentation: Stable  Clinical Decision Making: Low    Patient History/  Comorbidities Examination  (body structures and functions, activity limitations, and/or participation restrictions) Clinical Presentation Clinical Decision Making (Complexity)   No documented Comorbidities or personal factors 1-2 Elements Stable and/or uncomplicated Low   1-2 documented comorbidities or personal factor 3 Elements Evolving clinical presentation with changing characteristics Moderate   3-4 documented comorbidities or personal factors 4 or more Unstable and unpredictable High             Marilu Oscar  9/17/2019  5:50 PM

## 2021-06-01 NOTE — PROGRESS NOTES
[facility]   Pelvic Initial Evaluation    Patient Name: Angelic Marrero  Date of evaluation: 9/17/2019  Referral Diagnosis: Stress incontinence  Referring provider: Tammy Brennan A*  Visit Diagnosis:     ICD-10-CM    1. Stress incontinence N39.3        Assessment:       Angelic is a 39 year old female who began to notice stress inconvenience 0-2 x/day in the summer of 2016 after the birth of her fourth child in May. She describes loss of small amounts of urine with laughing, sneezing , coughing, or running. Her exam today show mild strength loss and moderate endurance loss of the pelvic floor musculature.  Pt. is a good candidate for skilled PT services to improve pain levels and function.    Goals:  No data recorded  Patient will experience no leakage with coughing, sneezing, jumping : in 8-10 weeks;Comment  Comment: running      Patient's expectations/goals are realistic.    Barriers to Learning or Achieving Goals:  No Barriers.    Goals an plan of care were set in collaboration with patient. Plan of care is dynamic and will be modified on an ongoing basis.       Plan / Patient Instructions:        Plan of Care:   Authorization / Certification Start Date: 09/17/19  Authorization / Certification End Date: 12/17/19  Authorization / Certification Number of Visits: 3-4  Communication with: Referral Source  Patient Related Instruction: Nature of Condition;Treatment plan and rationale;Self Care instruction;Basis of treatment;Body mechanics;Precautions;Expected outcome  Times per Week: 1x/month  Number of Weeks: 12  Number of Visits: 4  Discharge Planning: Home exercise program  Precautions / Restrictions : None known  Therapeutic Exercise: Pelvic Floor retraining  Functional Training (ADL's): self care;ADL's;ergonomics;other  Functional Training: return to running advice/suggestions      Plan for next visit: Will see patient in 4-5 weeks and recheck sEMG strength and endurance of pelvic floor.      Subjective:         Social information:   Living Situation:single family home, lives with others  and stairs  with railing   and 4 children: 11,9,6,3 years old Occupation: Stay at home mom   Work Status:NA   Equipment Available: None    History of Present Illness:    Angelic is a 39 y.o. female who presents to therapy today with complaints of stress incontinence. Date of onset/duration of symptoms is summer of 2016. Onset was gradual. Symptoms are not improving. She denies history of similar symptoms. She describes her previous level of function as not limited    Number of pregnancies:   4                                              Length of pushin-3 pushes each child  Number of vaginal deliveries:    4                                    Number of c-sections:  0     Number of episiotomies: 0  History of Urinary Tract Infections: No    Bowel and Bladder Patterns:  Voiding frequency # times/day: 6  Voiding frequency # times/night: 0  Incontinence # of episodes/day: 0-2  Incontinence # of episodes/night:0  Amount of urine lost: small  Self rating of severity on Incontinence:  2/10    Bowel movement frequency:   1per day  Do you have bowel incontinence?   No    Fluid Intake:  __6___cups/day           Caffeinated/carbonated beverages /day:  ____2_/day    Pain Ratin  Pain rating at best: 0  Pain rating at worst: 0  Pain description: Denies pain of pelvic or abdominal area, LB or hips    Past Surgical History:   Procedure Laterality Date     WISDOM TOOTH EXTRACTION      ~18 or 20.  No complications        Past Medical History:   Diagnosis Date     Anxiety     Saw therapist at  in [place] over summer 2015.  Doing well now.  Also some marriage therpay in the past.  Tried prozac last spring and another med and didn't like the side effects -- stopped within 1 mo of starting.      Menstrual migraine      Rh negative state in antepartum period 2016    Rhophylac given 3/7/16     Seasonal allergies       Varicella         Functional limitations are described as occurring with:   Loss of urine 0-2x/day with laughing, sneezing, coughing, running    Patient reports she has had no previous PT       Objective:      Note: Items left blank indicates the item was not performed or not indicated at the time of the evaluation.    External Exam  Posture Observation:      General sitting posture is  normal.  General standing posture is normal.    Lower Extremity Strength:  Date:      LE strength/5 Right Left Right Left Right Left   Hip Flexion (L1-3)         Hip Extension (L5-S1)         Hip Abduction (L4-5)         Hip Adduction (L2-3)         Hip External Rotation         Hip Internal Rotation         Knee Extension (L3-4)         Knee Flexion         Ankle Dorsiflexion (L4-5) 5 5       Great Toe Extension (L5) 5 5       Ankle Plantar flexion (S1)         Abdominals         Lumbar ROM:  Date:      *Indicate scale AROM AROM AROM   Lumbar Flexion WFL- no pain     Lumbar Extension       Right Left Right Left Right Left   Lumbar Sidebending         Lumbar Rotation         Thoracic Flexion      Thoracic Extension      Thoracic Sidebending         Thoracic Rotation              Skin Integrity:  Introitus normal  Pelvic Clock: no tenderness noted on external palpation. Tender points noted as follows:      Internal Exam  Sensation:  Normal LE and perineal area  Muscle tone: normal  Strength - Vaginal   MMT: 2   Endurance: 5 sec   Repetitions: 5    Palpation:  Pelvis and trochanters level in standing      Patient consented to sEMG applied manpreet rectally, and measurement on contraction taken in multiple positions to determine best positions to exercise in.  Supine (legs extended or flexed) : 14-21 mA  Sittin-9 mA  Standin-15 mA      Treatment Today     TREATMENT MINUTES COMMENTS   Evaluation 25 Pelvic Floor   Self-care/ Home management     Manual therapy     Neuromuscular Re-education     Therapeutic Activity     Therapeutic  Exercises 30 Issued and performed long and short Kegel's. Also discussed ideas for graded return to running program and suggested shortened stride to decrease impact.  Exercises:  Exercise #1: Short Kegel's- 1 second  Comment #1: 5 reps- 4x/day for a total of 20 reps  Exercise #2: Long Kegel's- 6-10 seconds  Comment #2: 6-10 reps, 2x/day, patient to start with 6x6 and increase by 1 second and 1 rep/week until she reaches 10 seconds for 10 reps  Exercise #3: Knaack contraction  Comment #3: 1 contraction /day- 20-40 seconds      Gait training     Modality__________________                Total 55    Blank areas are intentional and mean the treatment did not include these items.     PT Evaluation Code: (Please list factors)  Patient History/Comorbidities: 4 vaginal deliveries with normal labor  Examination: Pelvic Floor, Stress Incontinence   Clinical Presentation: stable  Clinical Decision Making: Low    Patient History/  Comorbidities Examination  (body structures and functions, activity limitations, and/or participation restrictions) Clinical Presentation Clinical Decision Making (Complexity)   No documented Comorbidities or personal factors 1-2 Elements Stable and/or uncomplicated Low   1-2 documented comorbidities or personal factor 3 Elements Evolving clinical presentation with changing characteristics Moderate   3-4 documented comorbidities or personal factors 4 or more Unstable and unpredictable High             Marilu Oscar  9/17/2019  5:50 PM

## 2021-06-02 VITALS — BODY MASS INDEX: 23.61 KG/M2 | HEIGHT: 64 IN | WEIGHT: 138.3 LBS

## 2021-06-02 NOTE — PROGRESS NOTES
[facility] Discharge Summary  Patient Name: Angelic Marrero  Date: 10/9/2019  Referral Diagnosis: Stress Incontinence  Referring provider: Tammy Brennan A*  Visit Diagnosis:   1. Stress incontinence         Goals:  No data recorded  Patient will experience no leakage with coughing, sneezing, jumping : in 8-10 weeks;Comment  Comment: running  Progress to goals lost to follow up    Patient was seen for one visit on 9/17/19 and received instruction on Kegel's exercises. The goals was to see patient in 4-5 weeks for recheck. But she has not been in for PT and has no further PT appointments set up.    Therapy will be discontinued at this time.  The patient will need a new referral to resume.    Thank you for your referral.  Marilu Oscar  10/9/2019  12:41 PM

## 2021-06-03 VITALS — BODY MASS INDEX: 23.6 KG/M2 | WEIGHT: 138.2 LBS | HEIGHT: 64 IN

## 2021-06-03 VITALS
DIASTOLIC BLOOD PRESSURE: 64 MMHG | SYSTOLIC BLOOD PRESSURE: 100 MMHG | WEIGHT: 140.5 LBS | BODY MASS INDEX: 23.99 KG/M2 | HEIGHT: 64 IN | HEART RATE: 64 BPM | RESPIRATION RATE: 16 BRPM

## 2021-06-03 VITALS — WEIGHT: 139.8 LBS | HEIGHT: 64 IN | BODY MASS INDEX: 23.87 KG/M2

## 2021-06-03 VITALS — HEIGHT: 64 IN | WEIGHT: 139 LBS | BODY MASS INDEX: 23.73 KG/M2

## 2021-06-04 VITALS
DIASTOLIC BLOOD PRESSURE: 60 MMHG | SYSTOLIC BLOOD PRESSURE: 104 MMHG | BODY MASS INDEX: 23.86 KG/M2 | WEIGHT: 139 LBS | HEART RATE: 59 BPM | TEMPERATURE: 98.9 F | OXYGEN SATURATION: 97 %

## 2021-06-04 VITALS — BODY MASS INDEX: 23.17 KG/M2 | WEIGHT: 135 LBS

## 2021-06-04 NOTE — PROGRESS NOTES
Assessment/Plan:       1. Mild major depression (H)  Discussed risks versus benefits of adding Wellbutrin to patient's regimen.  This may help with fatigue as well as depression symptoms.  Plan recheck mood in 4-6 weeks.  - buPROPion (WELLBUTRIN XL) 150 MG 24 hr tablet; Take 1 tablet (150 mg total) by mouth every morning.  Dispense: 90 tablet; Refill: 3    2. Iron deficiency  Recheck shows low-normal iron level, normal hemoglobin.  Patient will continue her iron supplement.  - HM2(CBC w/o Differential)  - Iron and Transferrin Iron Binding Capacity    3. Chronic fatigue  Thyroid screening is normal.  Patient may wish to follow up with functional medicine for this symptom.  She will research this a bit on the website and let me know if she desires referral.  - Thyroid Cascade    4. Arm numbness left  Recommended EMG to further work up this symptom.  Not clear carpal tunnel, but no neck discomfort either.  B12 is normal.  - Vitamin B12  - EMG- Left Arm; Future        Subjective:       Angelic Marrero is a 39 y.o. female who presents for recheck of some lab work and a discussion of mood and new symptom in her arm.  In terms of labs, she would like to recheck her hemoglobin and iron levels.  She has continued to take her iron supplement most days.  She reports fatigue despite sleeping a normal number of hours.  She also has dark circles under her eyes and feels cold much of the time.  In terms of her mood, she feels irritable, but also has depressive symptoms primarily relating to fatigue and sleeping too much.  Lastly, she reports that over the last month or so, a few times weekly, she has a sensation where her whole left arm feels numb.  This is most prominent in the hand, but she can feel the sensation all the way up her arm.  The sensations happen about 1-2 times per week and last about 15 minutes.  This is not more prominent upon waking.    The following portions of the patient's history were reviewed and  "updated as appropriate: allergies, current medications, past medical history, past social history and problem list.      Current Outpatient Medications:      b complex vitamins tablet, , Disp: , Rfl:      CALCIUM CARBONATE (CALCIUM 500 ORAL), Take by mouth., Disp: , Rfl:      cholecalciferol, vitamin D3, (VITAMIN D3 ORAL), Take by mouth., Disp: , Rfl:      citalopram (CELEXA) 20 MG tablet, Take 1 tablet (20 mg total) by mouth daily., Disp: 90 tablet, Rfl: 3     FERROUS SULFATE (SLOW FE ORAL), Take by mouth daily., Disp: , Rfl:      multivit with calcium,iron,min (WOMEN'S DAILY MULTIVITAMIN ORAL), Take by mouth., Disp: , Rfl:      norgestimate-ethinyl estradiol (ORTHO-CYCLEN, 28,) 0.25-35 mg-mcg per tablet, Take 1 tablet by mouth daily., Disp: 3 Package, Rfl: 3     OMEGA-3/DHA/EPA/FISH OIL (FISH OIL-OMEGA-3 FATTY ACIDS) 300-1,000 mg capsule, Take 2 g by mouth daily., Disp: , Rfl:     Review of Systems   A 12 point comprehensive review of systems was negative except as noted.      Objective:      /64 (Patient Site: Left Arm, Patient Position: Sitting, Cuff Size: Adult Regular)   Pulse 64   Resp 16   Ht 5' 4\" (1.626 m)   Wt 140 lb 8 oz (63.7 kg)   LMP 12/14/2019   Breastfeeding No   BMI 24.12 kg/m      General appearance: alert, appears stated age and cooperative  Head: Normocephalic, without obvious abnormality, atraumatic  Eyes: conjunctivae clear, sclerae anicteric  Neck: no adenopathy, supple, symmetrical, trachea midline and thyroid not enlarged, symmetric, no tenderness/mass/nodules  Lungs: clear to auscultation bilaterally  Heart: regular rate and rhythm, S1, S2 normal, no murmur, click, rub or gallop  Extremities: extremities normal, atraumatic, no cyanosis or edema  Neurologic: Grossly normal, strength 5/5 UEs bilaterally with hand , wrist flexion/extension, biceps and triceps testing  Psychiatric: speech is fluent and thought process is linear, affect is reactive and appropriate, mood is " described as mildly depressed        Results for orders placed or performed in visit on 12/18/19   HM2(CBC w/o Differential)   Result Value Ref Range    WBC 7.1 4.0 - 11.0 thou/uL    RBC 4.28 3.80 - 5.40 mill/uL    Hemoglobin 12.8 12.0 - 16.0 g/dL    Hematocrit 39.4 35.0 - 47.0 %    MCV 92 80 - 100 fL    MCH 29.9 27.0 - 34.0 pg    MCHC 32.5 32.0 - 36.0 g/dL    RDW 12.8 11.0 - 14.5 %    Platelets 246 140 - 440 thou/uL    MPV 10.3 8.5 - 12.5 fL   Iron and Transferrin Iron Binding Capacity   Result Value Ref Range    Iron 89 42 - 175 ug/dL    Transferrin 299 212 - 360 mg/dL    Transferrin Saturation, Calculated 24 20 - 50 %    Transferrin IBC, Calculated 374 313 - 563 ug/dL   Thyroid Cascade   Result Value Ref Range    TSH 0.77 0.30 - 5.00 uIU/mL   Vitamin B12   Result Value Ref Range    Vitamin B-12 1,028 (H) 213 - 816 pg/mL

## 2021-06-05 VITALS
WEIGHT: 144 LBS | HEIGHT: 64 IN | SYSTOLIC BLOOD PRESSURE: 114 MMHG | DIASTOLIC BLOOD PRESSURE: 60 MMHG | BODY MASS INDEX: 24.59 KG/M2

## 2021-06-06 NOTE — TELEPHONE ENCOUNTER
RN Assessment/Reason for Call:   Okay to leave Detailed Message  Saba calling in, seen for lingering cough, nausea & influenza 2.5 weeks ago.  Taking Musinex.  Wanting something for nausea.Discussed diet.    RN Action/Disposition:  Protocol recommends see Dr in 24 hrs.  Offered [facility]  PCP & RLN full  MPW appt 11:20am Dr Alex  Call back if worse symptoms  Discussed home care measures.  Agrees to plan.     Jory Niño RN    Care Connection Triage/med refill  2/25/2020  10:29 AM        Reason for Disposition    [1] Continuous (nonstop) coughing interferes with work or school AND [2] no improvement using cough treatment per protocol    Nausea persists > 1 week    Protocols used: COUGH - CHRONIC-A-AH, NAUSEA-A-AH

## 2021-06-07 NOTE — TELEPHONE ENCOUNTER
Refill Approved    Rx renewed per Medication Renewal Policy. Medication was last renewed on 3/30/20.    Martha Linda, [facility] Connection Triage/Med Refill 4/22/2020     Requested Prescriptions   Pending Prescriptions Disp Refills     fluticasone propionate (FLONASE) 50 mcg/actuation nasal spray [Pharmacy Med Name: FLUTICASONE PROP 50 MCG SPRAY] 16 g 1     Sig: INSTILL 1 SPRAY IN EACH NOSTRIL 1-2 TIMES DAILY       Nasal Steroid Refill Protocol Passed - 4/21/2020  1:33 PM        Passed - Patient has had office visit/physical in last 2 years     Last office visit with prescriber/PCP: 12/18/2019 OR same dept: 12/18/2019 Erica Martinez MD OR same specialty: 12/18/2019 Erica Martinez MD Last physical: Visit date not found Last MTM visit: Visit date not found    Next appt within 3 mo: Visit date not found  Next physical within 3 mo: Visit date not found  Prescriber OR PCP: Erica Martinez MD  Last diagnosis associated with med order: 1. Cough  - fluticasone propionate (FLONASE) 50 mcg/actuation nasal spray [Pharmacy Med Name: FLUTICASONE PROP 50 MCG SPRAY]; INSTILL 1 SPRAY IN EACH NOSTRIL 1-2 TIMES DAILY  Dispense: 16 g; Refill: 1     If protocol passes may refill for 12 months if within 3 months of last provider visit (or a total of 15 months).

## 2021-06-08 NOTE — PROGRESS NOTES
"Angelic Marrero is a 39 y.o. female who is being evaluated via a billable video visit.      The patient has been notified of following:     \"This video visit will be conducted via a call between you and your physician/provider. We have found that certain health care needs can be provided without the need for an in-person physical exam.  This service lets us provide the care you need with a video conversation.  If a prescription is necessary we can send it directly to your pharmacy.  If lab work is needed we can place an order for that and you can then stop by our lab to have the test done at a later time.    Video visits are billed at different rates depending on your insurance coverage. Please reach out to your insurance provider with any questions.    If during the course of the call the physician/provider feels a video visit is not appropriate, you will not be charged for this service.\"    Patient has given verbal consent to a Video visit? Yes    Patient would like to receive their AVS by AVS Preference: Andry.    Patient would like the video invitation sent by: Text to cell phone: [phone]    Will anyone else be joining your video visit? No        Video Start Time: 4:37 PM    Additional provider notes:     Assessment/Plan:       1. Mild major depression (H)  Patient reports she is doing well on her current dose of citalopram and does not desire any changes.  She did try Wellbutrin as previously instructed when I last saw her, but reported that this increased her anxiety.  This will be added to her allergy list as an intolerance.  Plan recheck mood in 6 months, sooner as needed.  She will be due for a complete physical exam in August, typically sees the nurse midwives.    2. Exposure to 2019 Novel Coronavirus  Patient had a flulike illness in the month of February, also had a milder illness in March after flying to [place].  She would like to be antibody tested to see if either of these illnesses could " have been related to novel coronavirus.  Order placed for antibody testing.  - COVID-19 Virus Antibody; Future        Subjective:       Angelic Marrero is a 39 y.o. female who presents for primarily follow-up of her mood.  When I last saw the patient, in December, we discussed adding Wellbutrin for additional benefit in terms of depressive symptoms at that time.  Patient tried this, however this exacerbated her anxiety.  She now states she is feeling quite well on citalopram alone.  She continues with occasional symptoms of difficulty sleeping and feeling down, but her PHQ-9 score is well within goal range.  Her only anxiety symptom right now is occasional irritability.  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  Second, patient mentions that she had a flulike illness in February.  She was seen a couple of different times and treated with a azithromycin, but her symptoms persisted for a number of weeks.  She then also flew to [place] and back in March and had a milder respiratory illness after that.  She is wondering about being tested for coronavirus antibodies.  She feels well currently.    The following portions of the patient's history were reviewed and updated as appropriate: allergies, current medications, past medical history, past social history and problem list.      Current Outpatient Medications:      albuterol (PROAIR HFA;PROVENTIL HFA;VENTOLIN HFA) 90 mcg/actuation inhaler, Inhale 2 puffs every 4 (four) hours as needed for shortness of breath., Disp: 1 each, Rfl: 0     CALCIUM CARBONATE (CALCIUM 500 ORAL), Take by mouth., Disp: , Rfl:      citalopram (CELEXA) 20 MG tablet, Take 1 tablet (20 mg total) by mouth daily., Disp: 90 tablet, Rfl: 3     FERROUS SULFATE (SLOW FE ORAL), Take by mouth daily., Disp: , Rfl:      fluticasone propionate (FLONASE) 50 mcg/actuation nasal spray, INSTILL 1 SPRAY IN EACH NOSTRIL 1-2 TIMES DAILY, Disp: 48 g, Rfl: 3     multivit with  calcium,iron,min (WOMEN'S DAILY MULTIVITAMIN ORAL), Take by mouth., Disp: , Rfl:      norgestimate-ethinyl estradiol (ORTHO-CYCLEN, 28,) 0.25-35 mg-mcg per tablet, Take 1 tablet by mouth daily., Disp: 3 Package, Rfl: 3     OMEGA-3/DHA/EPA/FISH OIL (FISH OIL-OMEGA-3 FATTY ACIDS) 300-1,000 mg capsule, Take 2 g by mouth daily., Disp: , Rfl:      b complex vitamins tablet, , Disp: , Rfl:      cholecalciferol, vitamin D3, (VITAMIN D3 ORAL), Take by mouth., Disp: , Rfl:     Review of Systems   Pertinent items are noted in HPI.      Objective:      Wt 135 lb (61.2 kg) Comment: patient reported  LMP 05/01/2020   Breastfeeding No   BMI 23.17 kg/m      General appearance: alert, appears stated age and cooperative  Head: Normocephalic, without obvious abnormality, atraumatic  Eyes: Conjunctivae clear, sclerae anicteric  Neurologic: Grossly normal, alert and oriented x3, good historian  Psychiatric: Speech is fluent and thought process is linear, affect is reactive and appropriate              Video-Visit Details    Type of service:  Video Visit    Video End Time (time video stopped): 4:51 PM  Originating Location (pt. Location): Home    Distant Location (provider location):  [facility]/OB     Platform used for Video Visit: Va Martinez MD

## 2021-06-08 NOTE — TELEPHONE ENCOUNTER
Left message to call back for: Saba  Information to relay to patient:  Please help patient make lab appointment when she returns call. Thank you.

## 2021-06-09 ENCOUNTER — VIRTUAL VISIT (OUTPATIENT)
Dept: PSYCHOLOGY | Facility: CLINIC | Age: 41
End: 2021-06-09
Payer: COMMERCIAL

## 2021-06-09 ENCOUNTER — DOCUMENTATION ONLY (OUTPATIENT)
Dept: PSYCHOLOGY | Facility: CLINIC | Age: 41
End: 2021-06-09

## 2021-06-09 DIAGNOSIS — F41.9 ANXIETY: Primary | ICD-10-CM

## 2021-06-09 PROCEDURE — 90832 PSYTX W PT 30 MINUTES: CPT | Mod: GT | Performed by: PSYCHOLOGIST

## 2021-06-09 NOTE — PROGRESS NOTES
Client Name: Saba Marrero   MRN: [id number]  : 1980    Client completed the [place] Multiphasic Personality Inventory-2 (MMPI-2), a self-report personality inventory, as part of her evaluation. Validity scales indicate that the client responded in an open and consistent manner, resulting in a valid profile. The following results are likely to be an accurate reflection of client's current functioning. Client s responses suggest that she is reporting a high level of general emotional distress. Individuals with similar profiles experience depression with anxiety, tension, apprehension and worry. They report lack of energy, drive, interest, and motivation for coping with problems. They may also experience vegetative symptoms of depression such as anhedonia, sleep disturbance, and loss of interest, energy and motivation. They may experience a sense of mental failure or decline and the depletion of energy needed to accomplish mental work. Thinking and problem-solving are experienced as effortful and as subject to going off course even when significant effort is made. Thinking may be viewed as impaired or unreliable; they may have a sense that  I can t seem to get my mind right.  They may seek to avoid others (both individuals and groups) because they feel uneasy and awkward in such situations and because they say they are happier being alone. They report impediments to performance in work including fatigue, apathy, feeling overwhelmed, distractibility and indecision.     .

## 2021-06-09 NOTE — PROGRESS NOTES
"Progress Note     Client Name:  Saba Marrero Date: 6/9/2021         Service Type: Individual  Video Visit: Yes, the patient's condition can be safely assessed and treated via synchronous audio and visual telemedicine encounter.      Reason for Video Visit: Services only offered telehealth    Location of Originating Site: Patient's home    Distant Site: Provider Home Office     Session Start Time: 8:00  Session End Time: 8:24     Session Length: 24 minutes    Session #: 2     Attendees: Client attended alone     Intervention: reviewed coping skills for managing anxiety and disorganization/restlessness; motivational interviewing: explored potential barriers for making changes    Identifying Information:  Client is a 40 year old, ,  female. Client was referred for a diagnostic assessment by PCP.  The purpose of this evaluation is to: provide treatment recommendations and clarify diagnosis.  Client is currently unemployed. Client attended the session alone.       Client's Statement of Presenting Concern:  Client reported seeking services at this time for diagnostic assessment and recommendations for treatment. Client's presenting concerns include:  She struggles with starting chores and getting started. She often feels \"frozen.\" She doesn't finish things (\"this has been my whole life\"). She feels that trying to keep her house in order is a challenge. She can't keep up with tasks and would rather sit. It can feel overwhelming and she doesn't know where to start. She is disorganized and can't find things; she misplaces and loses things. Each surface of the home is covered in \"stuff.\" She can't find her keys, phone, or wallet. She has locked her keys in her car so often that she had to get AAA because she was using road side assistance so much that her insurance rates were going up. She noted that her  is a complete \"slob\" and struggles with the same issues. She is forgetful regarding bills (uses auto " pay), appointments, childrens activities. She tries to use reminders in her phone so that she doesn't miss things. She might zone out during conversations and has difficulty listening. She is fidgety and restless and feels she needs to be doing something constantly. If she is trying to relax she is getting up to do dishes or shop for groceries or looking at something on her phone. She is always thinking about the next thing she needs to be researching or buying, etc. Client feels that she is feeling overloaded since being in charge of the household and managing her kids. She might forget their appointments and sometimes she misses them. If she gets busy with something she forgets she has somewhere to be. She is trying to use alarms/reminders but doesn't always make it to everything. She is overwhelmed with messes/chores/laundry. She is always buying birthday gifts on the way to birthday parties and is late for each party. She feels she doesn't plan well despite thinking ahead. 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 She felt she didn't have a good strategy for managing task lists. She would put things to the bottom of the list and never get to them. She was good at her job but felt she wasn't organized. She felt overwhelmed there too. Client stated that symptoms have resulted in the following functional impairments: childcare / parenting and management of the household and or completion of tasks.      History of Presenting Concern:  Client reported that she has not completed a previous ADHD diagnostic assessment.  Client has not received a previous diagnosis of ADHD. Client reported that medication has not been prescribed medication to address these problems.  "Client reported that these problem(s) began in childhood (\"my whole life\"). Client has not attempted to resolve these concerns in the past. Client reported that other professionals are involved in providing support / services. Client is prescribed Celexa by PCP.      Social History:  As a child, client reported that she failed to complete assigned chores in the home environment, had problems with organization and keeping track of items and misplaced or lost things. Client reported no difficulty with childhood peer relationships.  As a child, client reported having regular and consistent sleep patterns.  Client reported currently experiencing regular and consistent sleep patterns.  Client reported sleeping approximately 7-8 hours per night. She has phases where she might wake up in the middle of the night and have trouble falling back asleep. Client reported that she has not completed a sleep study. Client reported having a well balanced diet.  There are not significant nutritional concerns. Client reported sporadic exercise patterns.      Client's highest education level was graduate school. Client graduated high school in 1998. She estimated she obtained mostly As and Bs. During the elementary, middle, and high school years, patient recalls academic strengths in the area of math. Client reported experiencing academic problems in none (quit calculus because it got to be too hard). Client did not identify any learning problems. Client did not receive tutoring services during the school years. Client did not receive special education services. Client reported no particular problems during the school years. Client reported she was able to meet deadlines and turn things in on time. She would procrastinate and do things at the \"very last minute.\" She played sports and tried to do homework on the bus, on the bleachers, and before her games. She felt she worked best if she had an immediate deadline pressuring her to do " "work. She doodled often during class. She heard the teacher but felt bored and had to doodle to be able to pay attention. Client did attend post-secondary school. Client graduated from college ([employer] in [place], [place]) in 2003 (it took her 4.5 years) with a degree in business communication and marketing. She reported that she did well and obtained As and Bs in college. She got her master's of business graduate degree in 2009 (she had her son before she graduated). She reported she did \"fine\" and didn't recall struggling too much during that time. She would put things off and, again, needed that pressure of a deadline to get things done.      Client reported that she currently stays home with her children. She has done this for 4 years years. She used to work in marketing for 14 years. Client reported that she been frequently late for work, disorganized behavior and distractible behavior. Client feels that she is feeling overloaded since being in charge of the household and managing her kids. 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 She felt she didn't have a good strategy for managing task lists. She would put things to the bottom of the list and never get to them. She was good at her job but felt she wasn't organized. She felt overwhelmed there too. The client's work history includes: marketing (a few different jobs and then stayed with the same company for 12 years). The longest period of employment has been 12 years (she was \"laid off\"; the job was offered to a aby person for half the salary and it was going to be based in [place], so she decided to stay home). Client has not been terminated from a place of employment.       Risk Taking " "Behaviors:  Client reported no history of risk taking behaviors      Motor Vehicle Operation:  Client has received a 's license. Client has not received any moving violations.  Client reported the following driving habits: attentive and cautious. She can sometimes drive on \"auto \" and does not remember how she got places; but she feels like a safe . Sometimes she is distracted in the car and tries to multitask. According to client, other people are comfortable riding as a passenger when she is driving.        Mental Status Assessment:  Appearance:   Appropriate   Eye Contact:   Good   Psychomotor Behavior: Normal   Attitude:   Cooperative   Orientation:   All  Speech   Rate / Production: Normal    Volume:  Normal   Mood:    Normal  Affect:    Appropriate   Thought Content:  Clear   Thought Form:  Coherent  Logical   Insight:    Good       Review of Symptoms:  Depression:     Lack of interest, Change in energy level, Difficulties concentrating and Feeling sad, down, or depressed  Thu:             No Symptoms  Psychosis:       No Symptoms  Anxiety:           Excessive worry, Nervousness and Irritability  Panic:              No symptoms  Post Traumatic Stress Disorder:  No Symptoms   Eating Disorder:          No Symptoms   ADD / ADHD:              Inattentive, Poor task completion, Poor organizational skills, Distractibility and Forgetful  Conduct Disorder:       No symptoms  Autism Spectrum Disorder:     No symptoms  Obsessive Compulsive Disorder:       No Symptoms  Reckless Behavior: No symptoms        Safety Issues and Plan for Safety and Risk Management:  Client denies a history of suicidal ideation, suicide attempts, self-injurious behavior, homicidal ideation, homicidal behavior and and other safety concerns    Client denies current fears or concerns for personal safety.  Client denies current or recent suicidal ideation or behaviors.  Client denies current or recent homicidal ideation or " behaviors.  Client denies current or recent self injurious behavior or ideation.  Client denies other safety concerns.  Client reports there are no firearms in the house.  Recommended that patient call 911 or go to the local ED should there be a change in any of these risk factors.        Diagnostic Criteria:      The client does not report enough symptoms for the full criteria of any specific Anxiety Disorder to have been met   - Excessive anxiety and worry about a number of events or activities (such as work or school performance).    - Restlessness or feeling keyed up or on edge.    - Difficulty concentrating or mind going blank.    - Irritability.    Functional Status:  Client's symptoms are causing reduced functional status in the following areas: home with family, parenting/childrearing; household tasks/chores      DSM-5Diagnoses: (Sustained by DSM5 Criteria Listed Above)    300.09 (F41.8) Other Specified Anxiety Disorder     RULE OUT: ADHD    Attendance Agreement:  Client has signed Attendance Agreement:No: unable to sign via telehealth      Preliminary Plan:  The client reports no currently identified Zoroastrian, ethnic or cultural issues relevant to therapy.     services are not indicated.    Modifications to assist communication are not indicated.    Collaboration / coordination of treatment will be initiated with the following support professionals: primary care physician.    Referral to another professional/service is not indicated at this time..    A Release of Information is not needed at this time.    Client was given self and collaborative rating scales to be completed prior to the next appointment. Client consented to sending/receiving these measures via email.  Depression and anxiety rating scales were completed. A third appointment was not scheduled at this time.     Report to child / adult protection services was NA.    Patient will have open access to their mental health medical  record.    Janna York, PhD, LP  June 9, 2021

## 2021-06-11 NOTE — PROGRESS NOTES
Assessment:      Healthy female exam.   HX migraines; no aura or visual disturbances.   Contraceptive Management and counseling.      Plan:      1. Discussed nutrition and exercise.  Advised MVI daily.   2. Blood tests: future order placed for TSH, Lipid Panel and fasting glucose, patient will self-schedule.  3. Breast awareness discussed, reviewed personal risk factors for breast-cancer and discussed current evidence regarding clinical breast exam. Patient declines. Briefly began discussion regarding mammogram research including beginning between 40-50 years of age depending on recommendations.   4. Reviewed evidence surrounding pelvic exams, patient declines.  5. Contraception: POP rx sent for 1 year. We discussed mechanism of action of POP and patient desires to continue. We reviewed perfect use vs typical use of POP and LISA effectiveness. Not interested in LARc. Interested in LISA in the future, but prefers to stay with POP at this time as no difficulty remembering to take, and happy with current mood. If patient desires LISA will call clinic as we discussed today mechanism of action of LISA, risk factors including change to mood, risk for blood clot, signs of this occurring, possible affect on sexuality/libido/pelvic pain, headaches and nausea. Patient is aware of how to start LISA with back up method as well as daily use and what to do if misses a pill. Information also provided through [facility]. Patient has no contraindication to LISA, so in this writer's opinion if desires to switch within 1 year, okay to prescribe. Discussed using Reclipsen due to side effect profile and possible previous use.   6.  Next pap due 2020. HPV co-testing discussed with that pap, then paps q 5 yrs if both negative.    45.  RTC 1 year for annual physical exam, PRN    Subjective:      Angelic Marrero is a 36 y.o. female who presents for an annual exam. Saba is doing well, still nursing son who is just over 1 year old, plans to  continue nursing for as long as both she and son desire. Mood has been good, reports some anxiety/mood changes that seem cyclic but has not been tracking.   Feels that family is complete but not prepared to make permanent decision though  is open to vasectomy. Desires to continue POP at this time, open to discussion of LISA. \  Feels that hair is continuing to fall out but denies any other symptoms.     Healthy Habits:   Regular Exercise: Yes   Sunscreen Use: Yes  Healthy Diet: Yes  Dental Visits Regularly: Yes  Seat Belt: Yes  Sexually active: Yes. 1 male partner.   STI risk No  domestic violence No  Gun safety: NA  Breast awareness: Yes    Immunization History   Administered Date(s) Administered     Influenza,seasonal quad, PF, 36+MOS 2015     Tdap 2016     Immunization status: up to date and documented.    Gynecologic History  No LMP recorded. (amenorrhic)   Contraception: POP  Cancer screening:   Last Pap: . Results were: normal      OB History    Para Term  AB Living   4 4 4 0 0 4   SAB TAB Ectopic Multiple Live Births   0 0 0 0 4      # Outcome Date GA Lbr Jordy/2nd Weight Sex Delivery Anes PTL Lv   4 Term 16 40w0d 05:05 / 00:18 7 lb 3.9 oz (3.285 kg) M Vag-Spont EPI N REYNA   3 Term 13 39w5d 06:00 / 00:10 7 lb 12 oz (3.515 kg) F Vag-Spont EPI N REYNA      Birth Comments: SP separation, no other complications, BF x 27 mos   2 Term 08/18/10 37w4d 06:00 / 00:10 7 lb 12 oz (3.515 kg) M Vag-Spont None N REYNA      Birth Comments: no complication, BF x 27mos   1 Term 08 40w0d 06:00 / 00:10 7 lb 12 oz (3.515 kg) M Vag-Spont EPI N REYNA      Birth Comments: no compliations, lac with rep; BF x 9 mos.          Current Outpatient Prescriptions   Medication Sig Dispense Refill     CALCIUM CARBONATE (CALCIUM 500 ORAL) Take by mouth.       cholecalciferol, vitamin D3, 1,000 unit tablet Take 1,000 Units by mouth daily.       FERROUS SULFATE (SLOW FE ORAL) Take by mouth daily.        norethindrone (JOLIVETTE) 0.35 mg tablet Take 1 tablet (0.35 mg total) by mouth daily. 84 tablet 3     OMEGA-3/DHA/EPA/FISH OIL (FISH OIL-OMEGA-3 FATTY ACIDS) 300-1,000 mg capsule Take 2 g by mouth daily.       PRENATAL VIT #91/FE FUM/FA/DHA (PRENATAL + DHA ORAL) Take 1 tablet by mouth.       No current facility-administered medications for this visit.      Past Medical History:   Diagnosis Date     Anxiety     Saw therapist at  in [place] over summer 2015.  Doing well now.  Also some marriage therpay in the past.  Tried prozac last spring and another med and didn't like the side effects -- stopped within 1 mo of starting.      Menstrual migraine      Seasonal allergies      Varicella      Past Surgical History:   Procedure Laterality Date     WISDOM TOOTH EXTRACTION      ~18 or 20.  No complications     Review of patient's allergies indicates no known allergies.  Family History   Problem Relation Age of Onset     Cancer Maternal Grandfather      Lung cancer.  Hx of smoking.  Dx 70 or so, and .      Cancer Paternal Grandfather      Pancreatic cancer.  Dx late 60s or early 70s.   from this.      Aneurysm Paternal Grandmother       70s from this     Depression Mother      Med managed and therapy.  Hx of hospitalization in the past with this.      Thyroid disease Son 4     Hyperlipidemia Brother      Social History     Social History     Marital status:      Spouse name: Sudeep     Number of children: 4     Years of education: HIRA     Occupational History      [employer]. 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)     Social History Main Topics     Smoking status: Never Smoker     Smokeless tobacco: Never Used     Alcohol use 1.5 oz/week     3 Standard drinks or equivalent per week     Drug use: No     Sexual activity: Yes     Partners: Male     Other Topics Concern     Not on file     Social History Narrative       Review of Systems  General:  Denies  "problem  Eyes: Denies problem  Ears/Nose/Throat: Denies problem  Cardiovascular: Denies problem  Respiratory:  Denies problem  Gastrointestinal:  denies problems  Genitourinary: denies problems  Musculoskeletal:  Denies problem  Skin: Denies problem  Neurologic:denies problems  Psychiatric: anxiety symptoms  Endocrine: denies problems (some hair loss)         Objective:         Vitals:    07/17/17 1224   BP: (!) 82/60   Pulse: 60   Weight: 122 lb 4.8 oz (55.5 kg)   Height: 5' 4\" (1.626 m)       Physical Exam:  General Appearance: Alert, cooperative, no distress, appears stated age  Skin: Skin color, texture, turgor normal, no rashes or lesions  Throat: Lips, mucosa, and tongue normal; teeth and gums normal  HEENT: grossly normal; otoscopic and opthalmic exam not performed.   Neck: Supple, symmetrical, trachea midline, no adenopathy;  thyroid: not enlarged, symmetric, no tenderness/mass/nodules  Lungs: Clear to auscultation bilaterally, respirations unlabored  Breasts: Deferred  Heart: Regular rate and rhythm, S1 and S2 normal, no murmur, rub, or gallop  Abdomen: Soft, non-tender. No organomegaly or masses  Pelvic:Deferred        "

## 2021-06-12 NOTE — TELEPHONE ENCOUNTER
Faxed refill request received from [facility] pharmacy in [place] for patient's Sprintec 28 day tablets.  Refill prepped for CNM review.  Patient was last seen by CNMs on 8/1/2019.  She is due for a physical and pap smear.

## 2021-06-12 NOTE — TELEPHONE ENCOUNTER
Who is calling:  Saba Marrero  Reason for Call:  Letting Kori know that Saba morganed an physical   Date of last appointment with primary care: 5/27/2020  Okay to leave a detailed message: Yes

## 2021-06-13 NOTE — PROGRESS NOTES
Assessment/Plan:     1. Annual physical exam  - Discussed eating a well balanced diet is an important part of staying healthy. Increase dietary sources of fiber.   - Exercise: goal is 30mins of activity 5days/week with muscle strengthening activity working all major muscle groups at least twice per/week.   - Fluid intake 70 oz of water daily.  - bradycardia noted, patient is a runner   - She is interested in permanent contraception such as a tubal. Will refer to Dr. Reynaga, GYN     2. Seasonal allergic rhinitis, unspecified trigger  - Takes Zyrtec as needed.     3. Mild major depression (H)  4. Generalized anxiety disorder  - Currently taking Citalopram 20 mg daily.  - Patient reports symptoms are well controlled   - Follow up in 6 months or sooner as needed.     5. Urinary incontinence in female  - Pelvic floor exercises as recommended by Physical Therapy.         Subjective:     Angelic Marrero is a 40 y.o. female who presents for an annual exam. She reports being in good health, no acute concerns.     The patient reports that there is not domestic violence in her life.     Additional Concern:   Patient has a history of stress incontinence which started mildly after the birth of her second child. She has seeing physical therapy for a few weeks and doing Kegel exercises.   She has not noted any improvements but does endorse she needs to make more of an effort to do them.     Healthy Habits:   Regular Exercise: Yes 2-3 days a week   Sunscreen Use: Yes Ocasionally   Healthy Diet: Yes tries to eat fruits and vegetables and lean protein  Dental Visits Regularly: Yes  Sexually active: Yes.with . Uses oral contraceptive   Mammogram: No family history of cancer. Patient will defer. Discussed Mammogram at 45  Prevention of Osteoporosis: Yes takes vitamin D and calcium       Health Maintenance   Topic Date Due     DEPRESSION ACTION PLAN  1980     HEPATITIS C SCREENING  1980     PAP SMEAR  08/10/2020      HPV TEST  08/10/2020     PREVENTIVE CARE VISIT  2021     ADVANCE CARE PLANNING  2022     TD 18+ HE  2026     HIV SCREENING  Completed     INFLUENZA VACCINE RULE BASED  Completed     TDAP ADULT ONE TIME DOSE  Completed     Pneumococcal Vaccine: Pediatrics (0 to 5 Years) and At-Risk Patients (6 to 64 Years)  Aged Out     HEPATITIS B VACCINES  Aged Out         Immunization History   Administered Date(s) Administered     Hep A, Adult IM (19yr & older) 2007     Hep A, historic 2007     INFLUENZA,SEASONAL QUAD, PF, =/> 6months 2014, 2015, 10/11/2016, 10/22/2019     Influenza, inj, historic,unspecified 10/13/2010, 10/04/2017, 10/25/2018     Influenza,seasonal, Inj IIV3 10/13/2010     Influenza,seasonal,quad inj =/> 6months 10/04/2017, 2020     Rho (D) Immune Globulin 2013     Tdap 2016       Gynecologic History  No LMP recorded. 20. Has cramps, bloating.   Contraception: OCP (estrogen/progesterone)  Last Pap: . Results were: normal HPV testing: negative    OB History    Para Term  AB Living   4 4 4 0 0 4   SAB TAB Ectopic Multiple Live Births   0 0 0 0 4      # Outcome Date GA Lbr Jordy/2nd Weight Sex Delivery Anes PTL Lv   4 Term 16 40w0d 05:05 / 00:18 7 lb 3.9 oz (3.285 kg) M Vag-Spont EPI N REYNA   3 Term 13 39w5d 06:00 / 00:10 7 lb 12 oz (3.515 kg) F Vag-Spont EPI N REYNA      Birth Comments: SP separation, no other complications, BF x 27 mos   2 Term 08/18/10 37w4d 06:00 / 00:10 7 lb 12 oz (3.515 kg) M Vag-Spont None N REYNA      Birth Comments: no complication, BF x 27mos   1 Term 08 40w0d 06:00 / 00:10 7 lb 12 oz (3.515 kg) M Vag-Spont EPI N REYNA      Birth Comments: no compliations, lac with rep; BF x 9 mos.       Current Outpatient Medications   Medication Sig Dispense Refill     albuterol (PROAIR HFA;PROVENTIL HFA;VENTOLIN HFA) 90 mcg/actuation inhaler Inhale 2 puffs every 4 (four) hours as needed for shortness of  breath. 1 each 0     b complex vitamins tablet        CALCIUM CARBONATE (CALCIUM 500 ORAL) Take by mouth.       cholecalciferol, vitamin D3, (VITAMIN D3 ORAL) Take by mouth.       citalopram (CELEXA) 20 MG tablet Take 1 tablet (20 mg total) by mouth daily. 90 tablet 1     FERROUS SULFATE (SLOW FE ORAL) Take by mouth daily.       fluticasone propionate (FLONASE) 50 mcg/actuation nasal spray INSTILL 1 SPRAY IN EACH NOSTRIL 1-2 TIMES DAILY 48 g 3     multivit with calcium,iron,min (WOMEN'S DAILY MULTIVITAMIN ORAL) Take by mouth.       norgestimate-ethinyl estradioL (ORTHO-CYCLEN, 28,) 0.25-35 mg-mcg per tablet Take 1 tablet by mouth daily. 3 Package 4     OMEGA-3/DHA/EPA/FISH OIL (FISH OIL-OMEGA-3 FATTY ACIDS) 300-1,000 mg capsule Take 2 g by mouth daily.       No current facility-administered medications for this visit.      Past Medical History:   Diagnosis Date     Anxiety     Saw therapist at  in [place] over summer 2015.  Doing well now.  Also some marriage therpay in the past.  Tried prozac last spring and another med and didn't like the side effects -- stopped within 1 mo of starting.      Menstrual migraine      Rh negative state in antepartum period 2016    Rhophylac given 3/7/16     Seasonal allergies      Varicella      Past Surgical History:   Procedure Laterality Date     WISDOM TOOTH EXTRACTION      ~18 or 20.  No complications     Patient has no known allergies.  Family History   Problem Relation Age of Onset     Cancer Maternal Grandfather         Lung cancer.  Hx of smoking.  Dx 70 or so, and .      Cancer Paternal Grandfather         Pancreatic cancer.  Dx late 60s or early 70s.   from this.      Aneurysm Paternal Grandmother          70s from this     Depression Mother         Med managed and therapy.  Hx of hospitalization in the past with this.      Thyroid disease Son 4     Hyperlipidemia Brother      Depression Brother      Depression Sister      No Medical Problems Father       No Medical Problems Brother      Breast cancer Neg Hx      Colon cancer Neg Hx      Heart disease Neg Hx      Social History     Socioeconomic History     Marital status:      Spouse name: Sudeep     Number of children: 3     Years of education: HIRA     Highest education level: Not on file   Occupational History     Occupation:      Employer: [employer]     Comment: [employer]. 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)   Social Needs     Financial resource strain: Not on file     Food insecurity     Worry: Not on file     Inability: Not on file     Transportation needs     Medical: Not on file     Non-medical: Not on file   Tobacco Use     Smoking status: Never Smoker     Smokeless tobacco: Never Used   Substance and Sexual Activity     Alcohol use: Yes     Alcohol/week: 2.5 standard drinks     Types: 3 Standard drinks or equivalent per week     Drug use: No     Sexual activity: Yes     Partners: Male     Birth control/protection: OCP     Comment:  Sudeep   Lifestyle     Physical activity     Days per week: Not on file     Minutes per session: Not on file     Stress: Not on file   Relationships     Social connections     Talks on phone: Not on file     Gets together: Not on file     Attends [employer]ats[employer] service: Not on file     Active member of club or organization: Not on file     Attends meetings of clubs or organizations: Not on file     Relationship status: Not on file     Intimate partner violence     Fear of current or ex partner: Not on file     Emotionally abused: Not on file     Physically abused: Not on file     Forced sexual activity: Not on file   Other Topics Concern     Not on file   Social History Narrative     Not on file       Review of Systems  General:  Negative except as noted above  Eyes: Negative except as noted above  Ears/Nose/Throat: Negative except as noted above  Cardiovascular: Negative except as noted above  Respiratory:  Negative except as noted  "above  Gastrointestinal:  Negative except as noted above  Musculoskeletal:  Negative except as noted above  Skin: Negative except as noted above  Neurologic: Negative except as noted above  Psychiatric: Negative except as noted above  Endocrine: Negative except as noted above  Heme/Lymphatic: Negative except as noted above   Allergic/Immunologic: Negative except as noted above      Objective:      Vitals:    12/01/20 1417   BP: 114/60   Weight: 144 lb (65.3 kg)   Height: 5' 4.25\" (1.632 m)     Wt Readings from Last 3 Encounters:   12/01/20 144 lb (65.3 kg)   05/27/20 135 lb (61.2 kg)   02/25/20 139 lb (63 kg)     Body mass index is 24.53 kg/m .     Physical Exam:  General Appearance: Alert, cooperative, no distress.  Eyes: PERRL, conjunctiva/corneas clear, EOM's intact  Ears: Normal TM's and external ear canals, both ears  Throat: Lips, mucosa, and tongue normal  Neck: Supple, symmetrical, trachea midline, no adenopathy;  thyroid: not enlarged, symmetric, no tenderness/mass/nodules  Lungs: Clear to auscultation bilaterally, respirations unlabored  Breasts: No breast masses, tenderness, asymmetry, or nipple discharge.  Heart: Regular rate and rhythm, S1 and S2 normal, no murmur, rub, or gallop  Abdomen: Soft, non-tender, bowel sounds active all four quadrants,  no masses, no organomegaly  Pelvic: Genital: EXTERNAL GENITALIA: Normal appearing vulva without masses, tenderness or lesions. PERINEUM: normal and intact. URETHRAL MEATUS: normal VAGINA:  vagina with normal color and without discharge or lesions. CERVIX: normal appearing cervix without discharge or lesions. Non-friable.  Extremities: Extremities normal, atraumatic, no cyanosis or edema  Skin: Skin color, texture, turgor normal, no rashes or lesions  Lymph nodes: Cervical, supraclavicular, and axillary nodes normal  Neurologic: Normal  Psych: Normal affect.  Does not appear anxious or depressed.           "

## 2021-06-13 NOTE — PROGRESS NOTES
Problem Visit for Depressive Symptoms    Assessment:   Anxiety  Depression  16-months postpartum  Lactating    Plan:   -Recommended patient start on SSRI to help with depression and anxiety symptoms.  Patient agreeable.  Rx sent for Zoloft 50 mg,  1 tab daily.  Encouraged her to take a half a tablet on days 1 through 4, and starting on day 5 do a full tablet.  Rx sent for 1 month supply with 1 refill.  -Encouraged her to make a follow-up appointment for a medication check and mood reassessment in 1 month with a family practice provider.  Patient has not had a primary care provider in 8 years and is open to establishing care with a provider here at the [facility].  Provider is reviewed and patient will schedule a visit with a provider before she leaves today.  -Encouraged to continue with therapy.  Discussed that if she is not connecting with her current therapist she can call the healthy CNM's and we can enter a referral in through [employer] and someone will call her to set up a therapy visit.  -Encouraged to call the on-call CN or [facility] (contact number given) she experiences any thoughts of wanting to hurt herself or anybody else.    Total time with patient, 30 minutes all of which was spent in counseling or coordination of care.    GOPAL Erickson, OSMANY    Subjective:   Saba is a 37-year-old mother of 4 who presents to clinic today for evaluation and treatment of depressive symptoms.  She states for the last month she has been feeling very down, hopeless, depressed, anxious, irritable.  She states it started about the time and her children went back to school.  She is feeling mad at her children and she is mean to her .  At the end of a long day she wants to talk to him about all the things she is worrying about and her  does not want to listen so she just ends up going to bed.  She is feeling very disconnected from her .  She is currently nursing her youngest  "who is 16 months old.    Current symptoms include depressed mood, fatigue, feelings of worthlessness/guilt, hopelessness, anxiety, loss of energy/fatigue and decreased labido.   Current treatment for depression: Individual therapy.  She recently initiated therapy with a therapist in [place].  She has had 2 visits with the therapist and is hopeful this will help as well.  Sleep problems: Absent    Early awakening:Absent    Energy: Poor  Motivation: Poor  Concentration: Good  Rumination/worrying: Moderate  Memory: Excellent  Tearfulness: Moderate   Anxiety: Marked   Panic: Absent   Hopelessness: Moderate  Suicidal ideation: Absent   Other/Psychosocial Stressors: Her children recently returned to school and she reports at the end of the day it is very overwhelming to be a mother of 4 children.  During the time when she is alone with her kids until her  comes home is a very stressful time for her.  Family history positive for depression in the patient's mother, brother(s) and sister(s).   Previous treatment modalities employed include: Medication.   Past episodes of depression, anxiety: Was treated for anxiety in 2015 and started on Prozac and lorazepam twice daily.  She reports that her anxiety symptoms worsened and she stopped the medicines.  Over time her symptoms improved.     Organic causes of depression present: None.  She reports occasionally having 1 drink on 2 or 3 occasions per week.    Objective:   /58 (Patient Site: Left Arm, Patient Position: Sitting, Cuff Size: Adult Regular)  Pulse (!) 58  Ht 5' 4\" (1.626 m)  Wt 126 lb 12.8 oz (57.5 kg)  LMP 10/03/2017 (Exact Date)  Breastfeeding? Yes  BMI 21.77 kg/m2     PHQ9= 11  NAYELY= 13    Mental Status Examination:  Posture and motor behavior: Appropriate, teary  Dress, grooming, personal hygiene: Appropriate  Facial expression: Appropriate  Speech: Appropriate  Mood: Appears sad, teary  Thought content: " Appropriate  Orientation:Appropriate  Attention and concentration: Appropriate    Experiencing the following symptoms of depression most of the day nearly every day for more than two consecutive weeks: depressed mood, loss of energy, loss of interests/pleasure, thoughts of worthlessness or guilt    Suicide Risk Assessment: No thoughts of wanting to hurt herself or anybody else.

## 2021-06-14 NOTE — PROGRESS NOTES
"  Assessment/Plan:       1. Dermatitis, perioral  Patient appears to have an acute perioral dermatitis.  Does not appear to be impetigo at this time.  She certainly could have had impetigo and resolved and then turned into perioral dermatitis or vice versa.  I will treat her with metronidazole cream twice daily for 7 days.  Risks and benefits of this topical cream was discussed with the patient today as well as potential side effects.  She was advised to follow-up if symptoms are not improving.  Would certainly consider Bactroban cream as another option if there is no improvement.  Since she is breast-feeding at this time I would avoid oral doxycycline.  If the rash changes I would consider a culture to assess for any other presence of an acute bacterial infection and antimicrobial resistance.  - metroNIDAZOLE (METROCREAM) 0.75 % cream; Apply to affected area twice daily for 7 days.  Dispense: 45 g; Refill: 0        Subjective:      Angelic Marrero is a 37 y.o. female who presents for rash near the corner of her mouth on the right side.  She was seen at walk-in care at the end of November and was treated for impetigo with oral Keflex.  This did not seem to improve the symptoms however did decrease the \"weeping and crusting\".  The rash is now mildly raised and slightly red and otherwise non-bothersome.  She denies any itching or change in symptoms.  Rash has spread slightly however continues to be located in the central area where it started.  No other contacts with similar rashes at home.  She has not had a similar rash previously.  She denies any new contact with skin products or makeup.  She has no other questions or concerns today. She is breast feeding her 18 month old 4-6 x daily currently.     The following portions of the patient's history were reviewed and updated as appropriate: allergies, current medications and problem list.    Review of Systems   Pertinent items are noted in HPI.      Objective:      " "/78  Pulse 68  Ht 5' 4\" (1.626 m)  Wt 127 lb (57.6 kg)  LMP 11/04/2017  SpO2 (!) 16%  BMI 21.8 kg/m2    General appearance: alert, appears stated age and cooperative  Head: Normocephalic, without obvious abnormality, atraumatic  Skin: Papular slightly erythematous lesion present near corner of right side of mouth.  Papular lesions are discrete in nature.  Symptoms are consistent with an acute perioral dermatitis.  There was no crusting or weeping.  The rash is dry.  Neurologic: Grossly normal    "

## 2021-06-14 NOTE — PROGRESS NOTES
CC: Facial rash      HPI    A month of right facial rash without itching nor pain. No unusual contacts nor exposures. Symptoms worsen. No fever, chills.       ROS: Pertinent ROS noted in HPI.     No Known Allergies    Patient Active Problem List   Diagnosis     Anxiety     Contraception management     Depression       Family History   Problem Relation Age of Onset     Cancer Maternal Grandfather      Lung cancer.  Hx of smoking.  Dx 70 or so, and .      Cancer Paternal Grandfather      Pancreatic cancer.  Dx late 60s or early 70s.   from this.      Aneurysm Paternal Grandmother       70s from this     Depression Mother      Med managed and therapy.  Hx of hospitalization in the past with this.      Thyroid disease Son 4     Hyperlipidemia Brother      Depression Brother      Depression Sister      Breast cancer Neg Hx      Colon cancer Neg Hx      Heart disease Neg Hx        Social History     Social History     Marital status:      Spouse name: Sudeep     Number of children: 3     Years of education: HIRA     Occupational History      [employer]     [employer]. 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)     Social History Main Topics     Smoking status: Never Smoker     Smokeless tobacco: Never Used     Alcohol use 1.5 oz/week     3 Standard drinks or equivalent per week     Drug use: No     Sexual activity: Yes     Partners: Male     Birth control/ protection: OCP      Comment:  Sudeep     Other Topics Concern     Not on file     Social History Narrative       Objective:    Vitals:    17 1303   BP: 108/66   Pulse: 72   Resp: 18   Temp: 98.3  F (36.8  C)   SpO2: 98%       Gen: NAD  Skin: 1.5 to 2 cm lesion laeral to right corner of mouth, crusted, slightly erythematous        Impetigo  -     cephalexin (KEFLEX) 500 MG capsule; Take 1 capsule (500 mg total) by mouth 4 (four) times a day for 10 days.

## 2021-06-14 NOTE — PROGRESS NOTES
"  Assessment/Plan:      NAYELY (generalized anxiety disorder)/Mild episode of recurrent major depressive disorder  Discussed options with patient and we decided to increase Zoloft to 100 mg daily.  If she is feeling great with this change, she will check again and let me know in 4-6 weeks.  If further adjustments are needed, she will follow-up in person and we can discuss further.  No as needed medicine for panic is needed at this time.  - Basic Metabolic Panel  - sertraline (ZOLOFT) 100 MG tablet; Take 1 tablet (100 mg total) by mouth daily.  Dispense: 30 tablet; Refill: 2        Subjective:       Angelic Marrero is a 37 y.o. female who presents for a discussion of depression and anxiety and a visit to establish care.  Of note, she delivered a baby 17 months ago.  She did not really have symptoms of postpartum depression or anxiety, really the symptoms became exacerbated this fall.  Her anxiety manifests with feeling irritable and \"crabby\".  She was started on Zoloft by the midwives, who followed her for her pregnancies.  At first she noted some nausea with this medicine, but this seems to have improved.  She has been on the medicine for 1 month.  She has tried fluoxetine and one other medicine that she is unable to remember in the past, but had some adverse side effects with these.  She notes no suicidal ideation.  She does note some overeating, trouble staying asleep, and feeling down several days per week.  She has noted a significant improvement in her depressive symptoms on Zoloft, has some breakthrough anxiety currently.  She has no other questions or concerns today.    The following portions of the patient's history were reviewed and updated as appropriate: allergies, current medications, past family history, past medical history, past social history, past surgical history and problem list.    Past Medical History:   Diagnosis Date     Anxiety     Saw therapist at  in [place] over summer 2015.  Doing well " now.  Also some marriage therpay in the past.  Tried prozac last spring and another med and didn't like the side effects -- stopped within 1 mo of starting.      Menstrual migraine      Rh negative state in antepartum period 2016    Rhophylac given 3/7/16     Seasonal allergies      Varicella      Past Surgical History:   Procedure Laterality Date     WISDOM TOOTH EXTRACTION      ~18 or 20.  No complications     Social History     Social History     Marital status:      Spouse name: Sudeep     Number of children: 3     Years of education: HIRA     Occupational History      [employer]     [employer]. 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  Hx of smoking.  Dx 70 or so, and .      Cancer Paternal Grandfather      Pancreatic cancer.  Dx late 60s or early 70s.   from this.      Aneurysm Paternal Grandmother       70s from this     Depression Mother      Med managed and therapy.  Hx of hospitalization in the past with this.      Thyroid disease Son 4     Hyperlipidemia Brother      Depression Brother      Depression Sister      Breast cancer Neg Hx      Colon cancer Neg Hx      Heart disease Neg Hx            Current Outpatient Prescriptions:      CALCIUM CARBONATE (CALCIUM 500 ORAL), Take by mouth., Disp: , Rfl:      cholecalciferol, vitamin D3, 1,000 unit tablet, Take 1,000 Units by mouth daily., Disp: , Rfl:      FERROUS SULFATE (SLOW FE ORAL), Take by mouth daily., Disp: , Rfl:      norethindrone (ORTHO MICRONOR)  "0.35 mg tablet, Take 1 tablet (0.35 mg total) by mouth daily., Disp: 84 tablet, Rfl: 3     OMEGA-3/DHA/EPA/FISH OIL (FISH OIL-OMEGA-3 FATTY ACIDS) 300-1,000 mg capsule, Take 2 g by mouth daily., Disp: , Rfl:      PRENATAL VIT #91/FE FUM/FA/DHA (PRENATAL + DHA ORAL), Take 1 tablet by mouth., Disp: , Rfl:      sertraline (ZOLOFT) 50 MG tablet, Take 1 tablet (50 mg total) by mouth daily., Disp: 30 tablet, Rfl: 2    Review of Systems   A 12 point comprehensive review of systems was negative except as noted.      Objective:      BP 96/60 (Patient Site: Right Arm, Patient Position: Sitting, Cuff Size: Adult Regular)  Pulse 72  Resp 16  Ht 5' 4\" (1.626 m)  Wt 124 lb 11.2 oz (56.6 kg)  LMP 11/10/2017  Breastfeeding? Yes  BMI 21.4 kg/m2    General appearance: alert, appears stated age and cooperative  Head: Normocephalic, without obvious abnormality, atraumatic  Eyes: Conjunctivae clear, sclerae anicteric  Psychiatric: Speech is fluent and thought processes linear, affect is reactive and appropriate, mood is described as mildly depressed and anxious        Results for orders placed or performed in visit on 11/13/17   Basic Metabolic Panel   Result Value Ref Range    Sodium 138 136 - 145 mmol/L    Potassium 4.4 3.5 - 5.0 mmol/L    Chloride 106 98 - 107 mmol/L    CO2 24 22 - 31 mmol/L    Anion Gap, Calculation 8 5 - 18 mmol/L    Glucose 87 70 - 125 mg/dL    Calcium 9.3 8.5 - 10.5 mg/dL    BUN 18 8 - 22 mg/dL    Creatinine 0.76 0.60 - 1.10 mg/dL    GFR MDRD Af Amer >60 >60 mL/min/1.73m2    GFR MDRD Non Af Amer >60 >60 mL/min/1.73m2            "

## 2021-06-15 NOTE — PROGRESS NOTES
Angelic Marrero is a 40 y.o. female who is being evaluated via a billable video visit.      How would you like to obtain your AVS? MyChart.  If dropped from the video visit, the video invitation should be resent by: Text to cell phone: [phone]  Will anyone else be joining your video visit? No      Video Start Time: 3:18 PM    Assessment & Plan     Concentration deficit  Patient feels that she has had symptoms of trouble concentrating, initiating and completing tasks since childhood.  She would like a formal evaluation for possible ADHD.  She would like to consider stimulant medication should she be found to have a diagnosis of ADHD.  We also discussed the importance of cognitive behavioral therapy, and it sounds like patient would be willing to follow with a therapist for this issue as well.  Referral placed for testing today.  - AMB REFERRAL TO MENTAL HEALTH AND ADDICTION  - Adult (18+); Assessment and Testing; ADHD; [place] [address] [phone]; We will contact you to schedule the appointment or please call with any questions; External Referral    Mild major depression (H)  PHQ-9 score is nearly within goal range on current dose of citalopram.  Patient does not desire any changes at present.  She does agrees to follow-up with a therapist after formal testing for ADHD.  - AMB REFERRAL TO MENTAL HEALTH AND ADDICTION  - Adult (18+); Assessment and Testing; ADHD; [place] [address] [phone]; We will contact you to schedule the appointment or please call with any questions; External Referral    Generalized anxiety disorder  NAYELY-7 score is within goal range with primary symptom being irritability.  Patient feels that this is related to trouble concentrating.  See above.  - AMB REFERRAL TO MENTAL HEALTH AND ADDICTION  - Adult (18+); Assessment and Testing; ADHD; [place] [address] [phone]; We will contact you to schedule the appointment  "or please call with any questions; External Referral    Encounter for surveillance of contraceptive pills  As patient has bothersome symptoms during her withdrawal week from hormones, we discussed cycling this to have her menses every 3 months.  Also Seasonique drops down to 10 mcg of estrogen instead of sugar pill.  If this is expensive, we may try a 20 mcg estrogen pill instead.  - L norgest/e.estradioL-e.estrad (SEASONIQUE) 0.15 mg-30 mcg (84)/10 mcg (7) 3MPk; Take 1 tablet by mouth daily.       Return in about 4 weeks (around 3/17/2021) for Recheck trouble concentraing (if evaluation complete).    Erica Martinez MD  [facility]   Angelic Marrero is 40 y.o. and presents today for the following health issues   HPI     Patient presents today primarily to discuss trouble concentrating.  She states she has been possible diagnosis of ADHD for about 15 years.  She has been reading online about women who are prescribed stimulant medications and feel \"human for the first time\".  She has difficulty initiating and finishing projects, feels that she is somewhat disorganized and messy.  She feels overwhelmed by having so many things going on at once.  She is not currently seeing a therapist.  Her PHQ-9 score today is 6 with most prominent symptoms including fatigue, sleeping too much and trouble concentrating.  Her NAYELY-7 score is 4 with most prominent symptom being irritability.  She feels fatigued, we had discussed chronic fatigue in the past.  She has been exercising more, which seems to help.  She states that as a kid she was a \"daydreamer\".  She got by and got good grades, but felt that she had a hard time concentrating.  Next, patient states that she has nausea and headaches during her withdrawal week on her OCP when she has her menses.  She denies migraine symptoms.    Review of Systems  Negative except as noted above      Objective       Vitals:  No vitals were obtained " today due to virtual visit.    Physical Exam  General: Well-developed well-nourished female, no acute distress  Neurologic: Alert and oriented, good historian  Psychiatric: Speech is fluent and thought process is linear, affect is reactive and appropriate, mood is described as neutral        Video-Visit Details    Type of service:  Video Visit    Video End Time (time video stopped): 3:39 PM  Originating Location (pt. Location): Home    Distant Location (provider location):  [facility]     Platform used for Video Visit: [employer]

## 2021-06-15 NOTE — TELEPHONE ENCOUNTER
Concentration deficit  Patient feels that she has had symptoms of trouble concentrating, initiating and completing tasks since childhood.  She would like a formal evaluation for possible ADHD.  She would like to consider stimulant medication should she be found to have a diagnosis of ADHD.  We also discussed the importance of cognitive behavioral therapy, and it sounds like patient would be willing to follow with a therapist for this issue as well.  Referral placed for testing today.  - AMB REFERRAL TO MENTAL HEALTH AND ADDICTION  - Adult (18+); Assessment and Testing; ADHD; [phone]; We will contact you to schedule the appointment or please call with any questions; External Referral     Did you refer to a specific clinician?

## 2021-06-15 NOTE — TELEPHONE ENCOUNTER
Schedulers attempted to reach patient on 2/19/21 for scheduling per documented call.  Patient will need to call facility to schedule at this point.  Number is [phone].

## 2021-06-16 ENCOUNTER — RECORDS - HEALTHEAST (OUTPATIENT)
Dept: FAMILY MEDICINE | Facility: CLINIC | Age: 41
End: 2021-06-16

## 2021-06-16 DIAGNOSIS — R32 URINARY INCONTINENCE IN FEMALE: ICD-10-CM

## 2021-06-18 NOTE — PATIENT INSTRUCTIONS - HE
Patient Instructions by Erica Martinez MD at 3/30/2020 11:50 AM     Author: Erica Martinez MD Service: -- Author Type: Physician    Filed: 3/30/2020 11:50 AM Encounter Date: 3/30/2020 Status: Signed    : Erica Martienz MD (Physician)           Viral Upper Respiratory Illness (Adult)  You have a viral upper respiratory illness (URI), which is another term for the common cold. This illness is contagious during the first few days. It is spread through the air by coughing and sneezing. It may also be spread by direct contact (touching the sick person and then touching your own eyes, nose, or mouth). Frequent handwashing will decrease risk of spread. Most viral illnesses go away within 7 to 10 days with rest and simple home remedies. Sometimes the illness may last for several weeks. Antibiotics will not kill a virus, and they are generally not prescribed for this condition.    Home care    If symptoms are severe, rest at home for the first 2 to 3 days. When you resume activity, don't let yourself get too tired.    Avoid being exposed to cigarette smoke (yours or others).    You may use acetaminophen or ibuprofen to control pain and fever, unless another medicine was prescribed. If you have chronic liver or kidney disease, have ever had a stomach ulcer or gastrointestinal bleeding, or are taking blood-thinning medicines, talk with your healthcare provider before using these medicines. Aspirin should never be given to anyone under 18 years of age who is ill with a viral infection or fever. It may cause severe liver or brain damage.    Your appetite may be poor, so a light diet is fine. Avoid dehydration by drinking 6 to 8 glasses of fluids per day (water, soft drinks, juices, tea, or soup). Extra fluids will help loosen secretions in the nose and lungs.    Over-the-counter cold medicines will not shorten the length of time youre sick, but they may be helpful for the following symptoms: cough,  sore throat, and nasal and sinus congestion. (Note: Do not use decongestants if you have high blood pressure.)  Follow-up care  Follow up with your healthcare provider, or as advised.  When to seek medical advice  Call your healthcare provider right away if any of these occur:    Cough with lots of colored sputum (mucus)    Severe headache; face, neck, or ear pain    Difficulty swallowing due to throat pain    Fever of 100.4 F (38 C) or higher, or as directed by your healthcare provider  Call 911  Call 911 if any of these occur:    Chest pain, shortness of breath, wheezing, or difficulty breathing    Coughing up blood    Inability to swallow due to throat pain  Date Last Reviewed: 9/13/2015 2000-2017 [employer]. [address] [place] [address], [place] [address]. All rights reserved. This information is not intended as a substitute for professional medical care. Always follow your healthcare professional's instructions.          Viral or Bacterial Bronchitis with Wheezing (Adult)    Bronchitis is an infection of the air passages. It often occurs during a cold and is usually caused by a virus. Symptoms include cough with mucus (phlegm) and low-grade fever. This illness is contagious during the first few days and is spread through the air by coughing and sneezing, or by direct contact (touching the sick person and then touching your own eyes, nose, or mouth).  If there is a lot of inflammation, air flow is restricted. The air passages may also go into spasm, especially if you have asthma. This causes wheezing and difficulty breathing even in people who do not have asthma.  Bronchitis usually lasts 7 to 14 days. The wheezing should improve with treatment during the first week. An inhaler is often prescribed to relax the air passages and stop wheezing. Antibiotics will be prescribed if your doctor thinks there is also a secondary bacterial infection.  Home care    If symptoms are severe, rest at home for the  first 2 to 3 days. When you go back to your usual activities, don't let yourself get too tired.    Do not smoke. Also avoid being exposed to secondhand smoke.    You may use over-the-counter medicine to control fever or pain, unless another medicine was prescribed. Note: If you have chronic liver or kidney disease or have ever had a stomach ulcer or gastrointestinal bleeding, talk with your healthcare provider before using these medicines. Also talk to your provider if you are taking medicine to prevent blood clots.) Aspirin should never be given to anyone younger than 18 years of age who is ill with a viral infection or fever. It may cause severe liver or brain damage.    Your appetite may be poor, so a light diet is fine. Avoid dehydration by drinking 6 to 8 glasses of fluids per day (such as water, soft drinks, sports drinks, juices, tea, or soup). Extra fluids will help loosen secretions in the nose and lungs.    Over-the-counter cough, cold, and sore-throat medicines will not shorten the length of the illness, but they may be helpful to reduce symptoms. (Note: Do not use decongestants if you have high blood pressure.)    If you were given an inhaler, use it exactly as directed. If you need to use it more often than prescribed, your condition may be worsening. If this happens, contact your healthcare provider.    If prescribed, finish all antibiotic medicine, even if you are feeling better after only a few days.  Follow-up care  Follow up with your healthcare provider, or as advised. If you had an X-ray or ECG (electrocardiogram), a specialist will review it. You will be notified of any new findings that may affect your care.  If you are age 65 or older, or if you have a chronic lung disease or condition that affects your immune system, or you smoke, ask your healthcare provider about getting a pneumococcal vaccine and a yearly flu shot (influenza vaccine).  When to seek medical advice  Call your healthcare  provider right away if any of these occur:    Fever of 100.4 F (38 C) or higher, or as directed by your healthcare provider    Coughing up increasing amounts of colored sputum    Weakness, drowsiness, headache, facial pain, ear pain, or a stiff neck  Call 911  Call 911 if any of these occur.    Coughing up blood    Worsening weakness, drowsiness, headache, or stiff neck    Increased wheezing not helped with medication, shortness of breath, or pain with breathing  Date Last Reviewed: 9/13/2015 2000-2017 [employer]. [address] [place] [address]. All rights reserved. This information is not intended as a substitute for professional medical care. Always follow your healthcare professional's instructions.          Allergic Rhinitis  Allergic rhinitis is an allergic reaction that affects the nose, and often the eyes. Its often known as nasal allergies. Nasal allergies are often due to things in the environment that are breathed in. Depending what you are sensitive to, nasal allergies may occur only during certain seasons. Or they may occur year round. Common indoor allergens include house dust mites, mold, cockroaches, and pet dander. Outdoor allergens include pollen from trees, grasses, and weeds.   Symptoms include a drippy, stuffy, and itchy nose. They also include sneezing and red and itchy eyes. You may feel tired more often. Severe allergies may also affect your breathing and trigger a condition called asthma.   Tests can be done to see what allergens are affecting you. You may be referred to an allergy specialist for testing and further evaluation.  Home care  Your healthcare provider may prescribe medicines to help relieve allergy symptoms. These may include oral medicines, nasal sprays, or eye drops.  Ask your provider for advice on how to avoid substances that you are allergic to. Below are a few tips for each type of allergen.  Pet dander:    Do not have pets with fur and  feathers.    If you can't avoid having a pet, keep it out of your bedroom and off upholstered furniture.  Pollen:    When pollen counts are high, keep windows of your car and home closed. If possible, use an air conditioner instead.    Wear a filter mask when mowing or doing yard work.  House dust mites:    Wash bedding every week in warm water and detergent and dry on a hot setting.    Cover the mattress, box spring, and pillows with allergy covers.     If possible, sleep in a room with no carpet, curtains, or upholstered furniture.  Cockroaches:    Store food in sealed containers.    Remove garbage from the home promptly.    Fix water leaks  Mold:    Keep humidity low by using a dehumidifier or air conditioner. Keep the dehumidifier and air conditioner clean and free of mold.    Clean moldy areas with bleach and water.  In general:    Vacuum once or twice a week. If possible, use a vacuum with a high-efficiency particulate air (HEPA) filter.    Do not smoke. Avoid cigarette smoke. Cigarette smoke is an irritant that can make symptoms worse.  Follow-up care  Follow up as advised by the healthcare provider or our staff. If you were referred to an allergy specialist, make this appointment promptly.  When to seek medical advice  Call your healthcare provider right away if the following occur:    Coughing or wheezing    Fever of 100.4 F (38 C) or higher, or as directed by your healthcare provider    Raised red bumps (hives)    Continuing symptoms, new symptoms, or worsening symptoms  Call 911 if you have:    Trouble breathing    Severe swelling of the face or severe itching of the eyes or mouth  Date Last Reviewed: 3/1/2017    [id number] [employer]. [address] [place] [address]. All rights reserved. This information is not intended as a substitute for professional medical care. Always follow your healthcare professional's instructions.        CDC Education: Bronchitis  https://www.cdc.gov/antibiotic-use/community/for-patients/common-illnesses/bronchitis.html

## 2021-06-19 NOTE — PROGRESS NOTES
"Assessment:     1.  Annual well woman exam  2.  Contraceptive management  3.  Breast-feeding, plans to wean  4.  Depression, stable on medication  5.  Anxiety, stable on medication     Plan:      1. Discussed nutrition and exercise.  Advised MVI, Vitamin D3 4000IU geltab and an omega 3 supplement daily   2. Blood tests: CBC, CMP, thyroid cascade  3. Breast self exam technique reviewed and patient encouraged to perform self-exam monthly.   4. Contraception: Currently using minipill.  Plans to discontinue after this current pack and will initiate Seasonique combined oral contraceptive pills to be taken as directed (84 active pills followed by 7 inactive yielding a menstrual cycle 4 times per year).  One pack, 3 refills.  Missed pill management and ACHES reviewed.  5.  Next pap due 2020. HPV co-testing discussed with that pap, then paps q 5 yrs if both negative.  6.  RTC 1 year for annual physical exam, PRN    Subjective:      Angelic Marrero is a 37 y.o. female who presents for an annual exam.  Currently using minipill for contraceptive management \"because I am still breast-feeding\".  However, patient states that she plans to wean her youngest soon.  Menstrual cycle is unpredictable on minipill.  Reports mood swings, bloating, acne, cramping, headaches, low sex drive and bleeding between periods.  Stay-at-home mom.  Self-care is excellent: Time to herself throughout the week to accomplish household tasks; running for exercise regularly.    Healthy Habits:   Regular Exercise: Yes   Sunscreen Use: Yes  Healthy Diet: Yes  Dental Visits Regularly: Yes  Seat Belt: Yes  Sexually active: Yes  STI risk No  domestic violence No    Self Breast Exam Monthly:Yes  Colonoscopy: No  Lipid Profile: No  Glucose Screen: No  Prevention of Osteoporosis: No  Last Dexa: N/A      Immunization History   Administered Date(s) Administered     Influenza, seasonal,quad inj 36+ mos 10/04/2017     Influenza,seasonal quad, PF, 36+MOS 09/24/2015 "     Tdap 2016     Immunization status: up to date and documented.    Gynecologic History  Patient's last menstrual period was 2018 (exact date).  Contraception: oral progesterone-only contraceptive    Cancer screening:   Last Pap: 2015. Results were: normal      OB History    Para Term  AB Living   4 4 4 0 0 4   SAB TAB Ectopic Multiple Live Births   0 0 0 0 4      # Outcome Date GA Lbr Jordy/2nd Weight Sex Delivery Anes PTL Lv   4 Term 16 40w0d 05:05 / 00:18 7 lb 3.9 oz (3.285 kg) M Vag-Spont EPI N REYNA   3 Term 13 39w5d 06:00 / 00:10 7 lb 12 oz (3.515 kg) F Vag-Spont EPI N REYNA      Birth Comments: SP separation, no other complications, BF x 27 mos   2 Term 08/18/10 37w4d 06:00 / 00:10 7 lb 12 oz (3.515 kg) M Vag-Spont None N REYNA      Birth Comments: no complication, BF x 27mos   1 Term 08 40w0d 06:00 / 00:10 7 lb 12 oz (3.515 kg) M Vag-Spont EPI N REYNA      Birth Comments: no compliations, lac with rep; BF x 9 mos.          Current Outpatient Prescriptions   Medication Sig Dispense Refill     CALCIUM CARBONATE (CALCIUM 500 ORAL) Take by mouth.       FERROUS SULFATE (SLOW FE ORAL) Take by mouth daily.       OMEGA-3/DHA/EPA/FISH OIL (FISH OIL-OMEGA-3 FATTY ACIDS) 300-1,000 mg capsule Take 2 g by mouth daily.       sertraline (ZOLOFT) 100 MG tablet Take 1 tablet (100 mg total) by mouth daily. 90 tablet 1     L norgest/e.estradiol-e.estrad 0.15 mg-30 mcg (84)/10 mcg (7) 3MPk Take 1 tablet by mouth daily. 1 Package 3     norethindrone (GODWIN-BE) 0.35 mg tablet TAKE 1 TABLET BY MOUTH EVERY DAY       PRENATAL VIT #91/FE FUM/FA/DHA (PRENATAL + DHA ORAL) Take 1 tablet by mouth.       No current facility-administered medications for this visit.      Past Medical History:   Diagnosis Date     Anxiety     Saw therapist at  in [place] over summer 2015.  Doing well now.  Also some marriage therpay in the past.  Tried prozac last spring and another med and didn't like the side  effects -- stopped within 1 mo of starting.      Menstrual migraine      Rh negative state in antepartum period 2016    Rhophylac given 3/7/16     Seasonal allergies      Varicella      Past Surgical History:   Procedure Laterality Date     WISDOM TOOTH EXTRACTION      ~18 or 20.  No complications     Review of patient's allergies indicates no known allergies.  Family History   Problem Relation Age of Onset     Cancer Maternal Grandfather      Lung cancer.  Hx of smoking.  Dx 70 or so, and .      Cancer Paternal Grandfather      Pancreatic cancer.  Dx late 60s or early 70s.   from this.      Aneurysm Paternal Grandmother       70s from this     Depression Mother      Med managed and therapy.  Hx of hospitalization in the past with this.      Thyroid disease Son 4     Hyperlipidemia Brother      Depression Brother      Depression Sister      Breast cancer Neg Hx      Colon cancer Neg Hx      Heart disease Neg Hx      Social History     Social History     Marital status:      Spouse name: Sudeep     Number of children: 3     Years of education: HIRA     Occupational History      [employer]. 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)     Social History Main Topics     Smoking status: Never Smoker     Smokeless tobacco: Never Used     Alcohol use 1.5 oz/week     3 Standard drinks or equivalent per week     Drug use: No     Sexual activity: Yes     Partners: Male     Birth control/ protection: OCP      Comment:  Sudeep     Other Topics Concern     Not on file     Social History Narrative       Review of Systems  General:  Denies problem  Eyes: Denies problem  Ears/Nose/Throat: Denies problem  Cardiovascular: Denies problem  Respiratory:  Denies problem  Gastrointestinal:  denies problems  Genitourinary: denies problems  Musculoskeletal:  Denies problem  Skin: Denies problem  Neurologic:headaches without aura  Psychiatric: Depression and anxiety symptoms  "well treated with antidepressant medication  Endocrine: denies problems      Objective:         Vitals:    07/19/18 0958   BP: 102/60   Pulse: 68   Weight: 135 lb 6.4 oz (61.4 kg)   Height: 5' 4\" (1.626 m)       Physical Exam:  General Appearance: Alert, cooperative, no distress, appears stated age  Skin: Skin color, texture, turgor normal, no rashes or lesions  Throat: Lips, mucosa, and tongue normal; teeth and gums normal  HEENT: grossly normal; otoscopic and opthalmic exam not performed.   Neck: Supple, symmetrical, trachea midline, no adenopathy;  thyroid: not enlarged, symmetric, no tenderness/mass/nodules  Lungs: Clear to auscultation bilaterally, respirations unlabored  Breasts: No breast masses, tenderness, asymmetry, or nipple discharge.  Heart: Regular rate and rhythm, S1 and S2 normal, no murmur  Abdomen: Soft, non-tender. No organomegaly or masses  Pelvic:External genitalia normal without lesions or irritation. Vagina and cervix show no lesions, inflammation, discharge or tenderness. No cystocele, No rectocele. Uterus & adnexal normal without masses or tenderness.       "

## 2021-06-20 NOTE — LETTER
Letter by Charito Herrera RN at      Author: Charito Herrera RN Service: -- Author Type: --    Filed:  Encounter Date: 7/2/2020 Status: (Other)       7/2/2020        Angelic Marrero  [address] [place] [address]    This letter provides a written record that you were tested for COVID-19 on 6/30/20.     Your result was negative. This means that we didnt find the virus that causes COVID-19 in your sample. A test may show negative when you do actually have the virus. This can happen when the virus is in the early stages of infection, before you feel illness symptoms.    If you have symptoms   Stay home and away from others (self-isolate) until you meet ALL of the guidelines below:    Youve had no fever--and no medicine that reduces fever--for 3 full days (72 hours). And ?    Your other symptoms have gotten better. For example, your cough or breathing has improved. And?    At least 10 days have passed since your symptoms started.    During this time:    Stay home. Dont go to work, school or anywhere else.     Stay in your own room, including for meals. Use your own bathroom if you can.    Stay away from others in your home. No hugging, kissing or shaking hands. No visitors.    Clean high touch surfaces often (doorknobs, counters, handles, etc.). Use a household cleaning spray or wipes. You can find a full list on the EPA website at www.epa.gov/pesticide-registration/list-n-disinfectants-use-against-sars-cov-2.    Cover your mouth and nose with a mask, tissue or washcloth to avoid spreading germs.    Wash your hands and face often with soap and water.    Going back to work  Check with your employer for any guidelines to follow for going back to work.    Employers: This document serves as formal notice that your employee tested negative for COVID-19, as of the testing date shown above.

## 2021-06-21 DIAGNOSIS — F41.1 GENERALIZED ANXIETY DISORDER: ICD-10-CM

## 2021-06-21 NOTE — PROGRESS NOTES
Assessment/Plan:       1. NAYELY (generalized anxiety disorder)/Mild episode of recurrent major depressive disorder (H)  Recommended a trial of increase of sertraline to 150 mg daily.  She will update me with how she is feeling in 4-6 weeks.  - sertraline (ZOLOFT) 100 MG tablet; Take 1.5 tablets (150 mg total) by mouth daily.  Dispense: 135 tablet; Refill: 3    2. Anemia, unspecified type  Recommended check of iron levels and Hgb given she has been taking daily iron supplement for so long.  Iron and Hgb are both normal, recommended cutting back to every other day iron or a few times weekly.  - HM2(CBC w/o Differential)  - Iron and Transferrin Iron Binding Capacity    3. Fatigue, unspecified type  Discussed minimizing stress, trying to sleep a bit more on a regular basis, and adding back some regular exercise.  Thyroid is normal.  - Thyroid Cascade    4. Weight gain  Patient questions whether this could be related to sertraline.  Wellbutrin would be a nice choice for her when she is done breastfeeding.  She will try some lifestyle modification for now.  - Thyroid Cascade    5. Urinary incontinence in female  Discussed the option of pelvic floor PT.  She will try to make a more concerted effort at regular Kegel exercises first.    6. Encounter for initial prescription of vaginal ring hormonal contraceptive  After a discussion of risks and benefits, patient wishes to try NuvaRing contraceptive.  She wishes to cycle this to have her menses every 3 months.        Subjective:       Angelic Marrero is a 38 y.o. female who presents for evaluation of weight gain, fatigue and worsening symptoms of depression.  She is wondering if her weight gain could be related to her sertraline.  Her son is almost 2 years old, but continues to breastfeed.  In terms of weight gain, she reports that her diet has not changed.  She is getting less sleep than previous, and for a short time has not been working out regularly, but previously ran  "on a regular basis.  In terms of fatigue, she has been getting about 7-7.5 hours of sleep nightly.  On days when she can sleep in, she will get 9-10 hours of sleep.  She has been taking an iron supplement for \"a couple of years\" she says, after pregnancy-related anemia.  She has not had her iron levels checked.  Her thyroid hormone level 3 months ago was on the higher end of normal.     The following portions of the patient's history were reviewed and updated as appropriate: allergies, current medications, past medical history, past social history and problem list.      Current Outpatient Prescriptions:      CALCIUM CARBONATE (CALCIUM 500 ORAL), Take by mouth., Disp: , Rfl:      FERROUS SULFATE (SLOW FE ORAL), Take by mouth daily., Disp: , Rfl:      L norgest/e.estradiol-e.estrad 0.15 mg-30 mcg (84)/10 mcg (7) 3MPk, Take 1 tablet by mouth daily., Disp: 1 Package, Rfl: 3     multivit with calcium,iron,min (WOMEN'S DAILY MULTIVITAMIN ORAL), Take by mouth., Disp: , Rfl:      sertraline (ZOLOFT) 100 MG tablet, Take 1 tablet (100 mg total) by mouth daily., Disp: 90 tablet, Rfl: 0     OMEGA-3/DHA/EPA/FISH OIL (FISH OIL-OMEGA-3 FATTY ACIDS) 300-1,000 mg capsule, Take 2 g by mouth daily., Disp: , Rfl:     Review of Systems   Pertinent items are noted in HPI.      Objective:      /64 (Patient Site: Left Arm, Patient Position: Sitting, Cuff Size: Adult Regular)  Pulse (!) 56  Resp 16  Ht 5' 4\" (1.626 m)  Wt 138 lb 4.8 oz (62.7 kg)  LMP 10/22/2018  Breastfeeding? Yes  BMI 23.74 kg/m2    General appearance: alert, appears stated age and cooperative  Neck: no adenopathy, supple, symmetrical, trachea midline and thyroid not enlarged, symmetric, no tenderness/mass/nodules  Heart: regular rate and rhythm, S1, S2 normal, no murmur, click, rub or gallop  Psychiatric: speech is fluent and thought process is linear, affect is reactive and appropriate, mood is described as mildly depressed        Results for orders placed " or performed in visit on 10/26/18   Thyroid Cascade   Result Value Ref Range    TSH 0.86 0.30 - 5.00 uIU/mL   HM2(CBC w/o Differential)   Result Value Ref Range    WBC 7.3 4.0 - 11.0 thou/uL    RBC 4.32 3.80 - 5.40 mill/uL    Hemoglobin 13.1 12.0 - 16.0 g/dL    Hematocrit 40.0 35.0 - 47.0 %    MCV 93 80 - 100 fL    MCH 30.4 27.0 - 34.0 pg    MCHC 32.7 32.0 - 36.0 g/dL    RDW 10.9 (L) 11.0 - 14.5 %    Platelets 259 140 - 440 thou/uL    MPV 7.7 7.0 - 10.0 fL   Iron and Transferrin Iron Binding Capacity   Result Value Ref Range    Iron 95 42 - 175 ug/dL    Transferrin 285 212 - 360 mg/dL    Transferrin Saturation, Calculated 27 20 - 50 %    Transferrin IBC, Calculated 356 313 - 563 ug/dL

## 2021-06-22 RX ORDER — CITALOPRAM HYDROBROMIDE 20 MG/1
30 TABLET ORAL DAILY
Qty: 45 TABLET | Refills: 0 | Status: SHIPPED | OUTPATIENT
Start: 2021-06-22 | End: 2021-07-12

## 2021-06-22 NOTE — TELEPHONE ENCOUNTER
citalopram (CELEXA) 20 MG   Last refilled: 4/14/21  Qty: 45 : 1    Last seen: 4/14/21  RTC: 2 MOS  Cancel: 6/9  No-show: 0  Next appt: NONE  Scheduling has been notified to contact the pt for appointment.  30 DAY AVI Refill pended and routed to the provider for review/determination due to  CANCEL X1

## 2021-06-23 NOTE — TELEPHONE ENCOUNTER
Change in pharmacy request- last filled 10/26/2018 qty 135 refill 3     Bladimir Britton,RN [facility]

## 2021-06-25 NOTE — TELEPHONE ENCOUNTER
It looks like patient discussed this with another provider in the past.  Referral placed, please give her the contact information from the referral.

## 2021-06-25 NOTE — TELEPHONE ENCOUNTER
Would you like to see this patient for an office visit to discuss or would you like to place a referral? Please advise.

## 2021-06-28 NOTE — PROGRESS NOTES
Progress Notes by Erica Martinez MD at 3/30/2020 11:50 AM     Author: Erica Martinez MD Service: -- Author Type: Physician    Filed: 3/30/2020 11:50 AM Encounter Date: 3/30/2020 Status: Signed    : Erica Martinez MD (Physician)       Provider E-Visit time total (minutes): 6      Assessment:   The encounter diagnosis was Cough.     Plan:     Medications Ordered   Medications   ? albuterol (PROAIR HFA;PROVENTIL HFA;VENTOLIN HFA) 90 mcg/actuation inhaler     Sig: Inhale 2 puffs every 4 (four) hours as needed for shortness of breath.     Dispense:  1 each     Refill:  0     May substitute the equivalent medication per insurance preference.   ? dextromethorphan-guaifenesin 60-1,200 mg per 12 hr tablet     Sig: Take 1 tablet by mouth every 12 (twelve) hours.     Dispense:  60 tablet     Refill:  0   ? fluticasone propionate (FLONASE ALLERGY RELIEF) 50 mcg/actuation nasal spray     Sig: Instill one spray to each nostril 1-2 times daily     Dispense:  16 g     Refill:  1     Patient Instructions       Viral Upper Respiratory Illness (Adult)  You have a viral upper respiratory illness (URI), which is another term for the common cold. This illness is contagious during the first few days. It is spread through the air by coughing and sneezing. It may also be spread by direct contact (touching the sick person and then touching your own eyes, nose, or mouth). Frequent handwashing will decrease risk of spread. Most viral illnesses go away within 7 to 10 days with rest and simple home remedies. Sometimes the illness may last for several weeks. Antibiotics will not kill a virus, and they are generally not prescribed for this condition.    Home care    If symptoms are severe, rest at home for the first 2 to 3 days. When you resume activity, don't let yourself get too tired.    Avoid being exposed to cigarette smoke (yours or others).    You may use acetaminophen or ibuprofen to control pain and fever,  unless another medicine was prescribed. If you have chronic liver or kidney disease, have ever had a stomach ulcer or gastrointestinal bleeding, or are taking blood-thinning medicines, talk with your healthcare provider before using these medicines. Aspirin should never be given to anyone under 18 years of age who is ill with a viral infection or fever. It may cause severe liver or brain damage.    Your appetite may be poor, so a light diet is fine. Avoid dehydration by drinking 6 to 8 glasses of fluids per day (water, soft drinks, juices, tea, or soup). Extra fluids will help loosen secretions in the nose and lungs.    Over-the-counter cold medicines will not shorten the length of time youre sick, but they may be helpful for the following symptoms: cough, sore throat, and nasal and sinus congestion. (Note: Do not use decongestants if you have high blood pressure.)  Follow-up care  Follow up with your healthcare provider, or as advised.  When to seek medical advice  Call your healthcare provider right away if any of these occur:    Cough with lots of colored sputum (mucus)    Severe headache; face, neck, or ear pain    Difficulty swallowing due to throat pain    Fever of 100.4 F (38 C) or higher, or as directed by your healthcare provider  Call 911  Call 911 if any of these occur:    Chest pain, shortness of breath, wheezing, or difficulty breathing    Coughing up blood    Inability to swallow due to throat pain  Date Last Reviewed: 9/13/2015 2000-2017 [employer]. [address] [place] [address]. All rights reserved. This information is not intended as a substitute for professional medical care. Always follow your healthcare professional's instructions.          Viral or Bacterial Bronchitis with Wheezing (Adult)    Bronchitis is an infection of the air passages. It often occurs during a cold and is usually caused by a virus. Symptoms include cough with mucus (phlegm) and low-grade fever.  This illness is contagious during the first few days and is spread through the air by coughing and sneezing, or by direct contact (touching the sick person and then touching your own eyes, nose, or mouth).  If there is a lot of inflammation, air flow is restricted. The air passages may also go into spasm, especially if you have asthma. This causes wheezing and difficulty breathing even in people who do not have asthma.  Bronchitis usually lasts 7 to 14 days. The wheezing should improve with treatment during the first week. An inhaler is often prescribed to relax the air passages and stop wheezing. Antibiotics will be prescribed if your doctor thinks there is also a secondary bacterial infection.  Home care    If symptoms are severe, rest at home for the first 2 to 3 days. When you go back to your usual activities, don't let yourself get too tired.    Do not smoke. Also avoid being exposed to secondhand smoke.    You may use over-the-counter medicine to control fever or pain, unless another medicine was prescribed. Note: If you have chronic liver or kidney disease or have ever had a stomach ulcer or gastrointestinal bleeding, talk with your healthcare provider before using these medicines. Also talk to your provider if you are taking medicine to prevent blood clots.) Aspirin should never be given to anyone younger than 18 years of age who is ill with a viral infection or fever. It may cause severe liver or brain damage.    Your appetite may be poor, so a light diet is fine. Avoid dehydration by drinking 6 to 8 glasses of fluids per day (such as water, soft drinks, sports drinks, juices, tea, or soup). Extra fluids will help loosen secretions in the nose and lungs.    Over-the-counter cough, cold, and sore-throat medicines will not shorten the length of the illness, but they may be helpful to reduce symptoms. (Note: Do not use decongestants if you have high blood pressure.)    If you were given an inhaler, use it  exactly as directed. If you need to use it more often than prescribed, your condition may be worsening. If this happens, contact your healthcare provider.    If prescribed, finish all antibiotic medicine, even if you are feeling better after only a few days.  Follow-up care  Follow up with your healthcare provider, or as advised. If you had an X-ray or ECG (electrocardiogram), a specialist will review it. You will be notified of any new findings that may affect your care.  If you are age 65 or older, or if you have a chronic lung disease or condition that affects your immune system, or you smoke, ask your healthcare provider about getting a pneumococcal vaccine and a yearly flu shot (influenza vaccine).  When to seek medical advice  Call your healthcare provider right away if any of these occur:    Fever of 100.4 F (38 C) or higher, or as directed by your healthcare provider    Coughing up increasing amounts of colored sputum    Weakness, drowsiness, headache, facial pain, ear pain, or a stiff neck  Call 911  Call 911 if any of these occur.    Coughing up blood    Worsening weakness, drowsiness, headache, or stiff neck    Increased wheezing not helped with medication, shortness of breath, or pain with breathing  Date Last Reviewed: 9/13/2015 2000-2017 [employer]. [address] [place] [address], [place] [address]. All rights reserved. This information is not intended as a substitute for professional medical care. Always follow your healthcare professional's instructions.          Allergic Rhinitis  Allergic rhinitis is an allergic reaction that affects the nose, and often the eyes. Its often known as nasal allergies. Nasal allergies are often due to things in the environment that are breathed in. Depending what you are sensitive to, nasal allergies may occur only during certain seasons. Or they may occur year round. Common indoor allergens include house dust mites, mold, cockroaches, and pet dander. Outdoor  allergens include pollen from trees, grasses, and weeds.   Symptoms include a drippy, stuffy, and itchy nose. They also include sneezing and red and itchy eyes. You may feel tired more often. Severe allergies may also affect your breathing and trigger a condition called asthma.   Tests can be done to see what allergens are affecting you. You may be referred to an allergy specialist for testing and further evaluation.  Home care  Your healthcare provider may prescribe medicines to help relieve allergy symptoms. These may include oral medicines, nasal sprays, or eye drops.  Ask your provider for advice on how to avoid substances that you are allergic to. Below are a few tips for each type of allergen.  Pet dander:    Do not have pets with fur and feathers.    If you can't avoid having a pet, keep it out of your bedroom and off upholstered furniture.  Pollen:    When pollen counts are high, keep windows of your car and home closed. If possible, use an air conditioner instead.    Wear a filter mask when mowing or doing yard work.  House dust mites:    Wash bedding every week in warm water and detergent and dry on a hot setting.    Cover the mattress, box spring, and pillows with allergy covers.     If possible, sleep in a room with no carpet, curtains, or upholstered furniture.  Cockroaches:    Store food in sealed containers.    Remove garbage from the home promptly.    Fix water leaks  Mold:    Keep humidity low by using a dehumidifier or air conditioner. Keep the dehumidifier and air conditioner clean and free of mold.    Clean moldy areas with bleach and water.  In general:    Vacuum once or twice a week. If possible, use a vacuum with a high-efficiency particulate air (HEPA) filter.    Do not smoke. Avoid cigarette smoke. Cigarette smoke is an irritant that can make symptoms worse.  Follow-up care  Follow up as advised by the healthcare provider or our staff. If you were referred to an allergy specialist, make this  appointment promptly.  When to seek medical advice  Call your healthcare provider right away if the following occur:    Coughing or wheezing    Fever of 100.4 F (38 C) or higher, or as directed by your healthcare provider    Raised red bumps (hives)    Continuing symptoms, new symptoms, or worsening symptoms  Call 911 if you have:    Trouble breathing    Severe swelling of the face or severe itching of the eyes or mouth  Date Last Reviewed: 3/1/2017    8097-8081 [employer]. [address] [place] [address]. All rights reserved. This information is not intended as a substitute for professional medical care. Always follow your healthcare professional's instructions.        CDC Education: Bronchitis https://www.cdc.gov/antibiotic-use/community/for-patients/common-illnesses/bronchitis.html     Return for further follow up if needed. Call [phone]) or schedule an appointment via [employer]    Subjective:   Angelic Marrero is a 39 y.o. female who submitted an eVisit request for evaluation of her Cough.  See the questionnaire and message section of encounter report for information related to history of present illness and review of systems.    The following portions of the patient's history were reviewed and updated as appropriate:  She does not have any pertinent problems on file.  She has No Known Allergies..     Objective:   No exam performed today, patient submitted as eVisit.

## 2021-06-28 NOTE — PROGRESS NOTES
Progress Notes by Trevor Alex MD at 2/25/2020 11:20 AM     Author: Trevor Alex MD Service: -- Author Type: Physician    Filed: 2/27/2020  8:57 AM Encounter Date: 2/25/2020 Status: Signed    : Trevor Alex MD (Physician)              [facility]    Comprehensive and complex medical care - Chronic disease management - Shared decision making - Care coordination - Compassionate care    Patient advocacy - Rational deprescribing - Minimally disruptive medicine - Ethical focus - Customized care          Date of Service: 2/25/2020  Primary Provider: Erica Martinez MD    Patient Care Team:  Erica Martinez MD as PCP - General (Family Medicine)  Eirca Martinez MD as Assigned PCP     ______________________________________________________________________     Patient's Pharmacy:    [id number] - [address] AT [facility] [address]  [address] [facility] [address]  Phone: [phone] Fax: [phone]    [facility] - [place] [id number] [facility] [address]  Phone: [phone] Fax: [phone]    [facility] [address] [facility] [employer] - [place] [address] [place] [address] [facility] [address]  Phone: [phone] Fax: [phone]     Patient's Contacts:  Name Home Phone Work Phone Mobile Phone Relationship Lgl SHANEL Draper [phone] Spouse    ALBERTINA IBARRA   [phone] Mother      Patient's Insurance:    Payor: [facility] SELECT CARE/LABORCARE / Plan: [facility] / Product Type: PPO/POS/FFS /           Angelic Marrero is a 39 y.o. female who comes in today for:    Chief Complaint   Patient presents with   ? Illness     X 2.5 WEEKS, PRODUCTIVE COUGH, NAUSEA, THINKS HAD INFLUENZA 2/6/20, WAS IN BED FOR 6 DAYS THEN WORKED OUT ON DAY 7 THEN WAS BACK IN BED AND COUGH STARTED       Active  Problem List:  Problem List as of 2/25/2020 Reviewed: 12/18/2019  2:36 PM by Erica Martinez MD       Unprioritized    Allergic rhinitis    Chronic fatigue    Contraception management    Generalized anxiety disorder    Mild major depression (H)    Multiple atypical nevi    Urinary incontinence in female           Current Outpatient Medications   Medication Sig   ? b complex vitamins tablet    ? CALCIUM CARBONATE (CALCIUM 500 ORAL) Take by mouth.   ? cholecalciferol, vitamin D3, (VITAMIN D3 ORAL) Take by mouth.   ? citalopram (CELEXA) 20 MG tablet Take 1 tablet (20 mg total) by mouth daily.   ? FERROUS SULFATE (SLOW FE ORAL) Take by mouth daily.   ? multivit with calcium,iron,min (WOMEN'S DAILY MULTIVITAMIN ORAL) Take by mouth.   ? norgestimate-ethinyl estradiol (ORTHO-CYCLEN, 28,) 0.25-35 mg-mcg per tablet Take 1 tablet by mouth daily.   ? OMEGA-3/DHA/EPA/FISH OIL (FISH OIL-OMEGA-3 FATTY ACIDS) 300-1,000 mg capsule Take 2 g by mouth daily.   ? azithromycin (ZITHROMAX Z-KARO) 250 MG tablet 2 tablets today and then 1 pill a day for 4 days.     Social History     Social History Narrative   ? Not on file       Subjective:     Comes in today for ongoing cough and infectious symptoms.    [facility] on February 4th as well.    2 of her 4 kids have been sick during this time.  The 2 have improved.  She has remained sick.    When seen in outside clinic, she had headache, nausea and diffuse aches.  Influenza testing negative but felt to have influenza like illness.    Felt somewhat better and then had recurrent illness.  Coughing.  No achiness.  Nausea she attributes to post-nasal drip with this.  Feels fatigued.  Runny nose.  No shortness of breath.  2 weeks of severe cough.  History of allergic rhinitis (AR) without apparent asthma.    We reviewed her other issues noted in the assessment but not specifically addressed in the HPI above.     On review of systems, the patient denies any chest pain  or shortness of breath.    Objective:     Wt Readings from Last 3 Encounters:   02/25/20 139 lb (63 kg)   12/18/19 140 lb 8 oz (63.7 kg)   08/01/19 139 lb (63 kg)     BP Readings from Last 3 Encounters:   02/25/20 104/60   12/18/19 100/64   08/01/19 98/58     /60   Pulse (!) 59   Temp 98.9  F (37.2  C) (Oral)   Wt 139 lb (63 kg)   LMP 02/01/2020 (Approximate)   SpO2 97%   BMI 23.86 kg/m     Patient is comfortable, no apparent distress.  Mood good.  Insight good.  Ears - mild serous otitis.  Nose exam shows moderate, reddish and bluish rhinitis.  Throat - clear.  Neck is supple, there is no cervical adenopathy.  No thyromegaly.  Heart regular rate and rhythm.  Lungs show diffuse rhonchi on auscultation bilaterally.  Respiratory effort is good.     Diagnostics:     Results for orders placed or performed in visit on 12/18/19   HM2(CBC w/o Differential)   Result Value Ref Range    WBC 7.1 4.0 - 11.0 thou/uL    RBC 4.28 3.80 - 5.40 mill/uL    Hemoglobin 12.8 12.0 - 16.0 g/dL    Hematocrit 39.4 35.0 - 47.0 %    MCV 92 80 - 100 fL    MCH 29.9 27.0 - 34.0 pg    MCHC 32.5 32.0 - 36.0 g/dL    RDW 12.8 11.0 - 14.5 %    Platelets 246 140 - 440 thou/uL    MPV 10.3 8.5 - 12.5 fL   Iron and Transferrin Iron Binding Capacity   Result Value Ref Range    Iron 89 42 - 175 ug/dL    Transferrin 299 212 - 360 mg/dL    Transferrin Saturation, Calculated 24 20 - 50 %    Transferrin IBC, Calculated 374 313 - 563 ug/dL   Thyroid Cascade   Result Value Ref Range    TSH 0.77 0.30 - 5.00 uIU/mL   Vitamin B12   Result Value Ref Range    Vitamin B-12 1,028 (H) 213 - 816 pg/mL       Assessment:     1. Rhinosinusitis    2. Bronchitis    3. Allergic rhinitis, unspecified seasonality, unspecified trigger    4. PND (post-nasal drip)        Quality review:     PHQ-2 Total Score: 1 (12/30/2019  8:00 AM)    PHQ-9 Total Score: 9 (12/30/2019  8:00 AM)    ______________________________________________________________________     BMI Readings  from Last 1 Encounters:   02/25/20 23.86 kg/m        Plan:     1. Azithromycin (Zithromax)   2. Cetirizine (Zyrtec) D or fexofenadine (Allegra) D for congestion and post-nasal drip.  3. Follow up PCP if not improving.      Trevor Alex MD  General Internal Medicine  [facility]       Return in about 1 year (around 2/25/2021), or if symptoms worsen or fail to improve, for follow up with your primary care provider.     No future appointments.      ______________________________________________________________________     Relevant ICD-10 codes and order associations:      ICD-10-CM    1. Rhinosinusitis J32.9    2. Bronchitis J40 azithromycin (ZITHROMAX Z-KARO) 250 MG tablet   3. Allergic rhinitis, unspecified seasonality, unspecified trigger J30.9    4. PND (post-nasal drip) R09.82

## 2021-07-12 ENCOUNTER — TELEPHONE (OUTPATIENT)
Dept: PSYCHIATRY | Facility: CLINIC | Age: 41
End: 2021-07-12

## 2021-07-12 ENCOUNTER — VIRTUAL VISIT (OUTPATIENT)
Dept: PSYCHIATRY | Facility: CLINIC | Age: 41
End: 2021-07-12
Attending: PSYCHIATRY & NEUROLOGY
Payer: COMMERCIAL

## 2021-07-12 DIAGNOSIS — F41.1 GENERALIZED ANXIETY DISORDER: ICD-10-CM

## 2021-07-12 DIAGNOSIS — F32.9 MAJOR DEPRESSIVE DISORDER, REMISSION STATUS UNSPECIFIED, UNSPECIFIED WHETHER RECURRENT: Primary | ICD-10-CM

## 2021-07-12 PROCEDURE — 99214 OFFICE O/P EST MOD 30 MIN: CPT | Mod: GC | Performed by: STUDENT IN AN ORGANIZED HEALTH CARE EDUCATION/TRAINING PROGRAM

## 2021-07-12 RX ORDER — CITALOPRAM HYDROBROMIDE 20 MG/1
30 TABLET ORAL DAILY
Qty: 45 TABLET | Refills: 3 | Status: SHIPPED | OUTPATIENT
Start: 2021-07-12 | End: 2021-07-13

## 2021-07-12 ASSESSMENT — PAIN SCALES - GENERAL: PAINLEVEL: NO PAIN (0)

## 2021-07-12 NOTE — PROGRESS NOTES
"VIDEO VISIT  Angelic Marrero is a 40 year old patient who is being evaluated via a billable video visit.      The patient has been notified of following:   \"We have found that certain health care needs can be provided without the need for an in-person physical exam. This service lets us provide the care you need with a video conversation. If a prescription is necessary we can send it directly to your pharmacy. If lab work is needed we can place an order for that and you can then stop by our lab to have the test done at a later time. Insurers are generally covering virtual visits as they would in-office visits so billing should not be different than normal.  If for some reason you do get billed incorrectly, you should contact the billing office to correct it and that number is in the AVS .    Patient has given verbal consent for video visit?: Yes   How would you like to obtain your AVS?: TookitakiS SmartPhrase [PsychAVS] has been placed in 'Patient Instructions': Yes      Video- Visit Details  Type of service:  video visit for diagnostic assessment  Time of service:    Date:  07/12/2021     Video Start Time:  9:22 AM        Video End Time:  10:00 AM    Reason for video visit:  Patient unable to travel due to Covid-19  Originating Site (patient location):  [facility] [place]   Location- Patient's home  Distant Site (provider location):  [facility]  Mode of Communication:  Video Conference via AmWell  Consent:  Patient has given verbal consent for video visit?: Yes        [facility]  Psychiatry Clinic  TRANSFER of CARE DIAGNOSTIC ASSESSMENT     CARE TEAM:  PCP- [facility], Psychotherapist- None    Angelic Marrero is a 40 year old who prefers the name Saba and uses pronouns she, her, hers, herself.      DIAGNOSIS   Generalized Anxiety Disorder    - Rule-out ADHD  Major Depressive Disorder     ASSESSMENT   Saba is doing well today and feels that the increased " "dose of citalopram has been very helpful. She has not been experiencing side effects. No changes to her medication regimen are indicated at this time. She is in the process of undergoing an attention deficit hyperactivity disorder assessment. Based on the results of that assessment, she may return to clinic earlier than in three months to discuss pharmaceutical interventions.    [place] review was not needed today.     PLAN                                                                                                                1) Meds-  - Continue citalopram 30 mg daily.    2) Psychotherapy-   - None - has not found therapist yet.     3) Next due-  Labs- N/A  EKG- N/A  Rating scales- PHQ9 and NAYELY-7 at next visit    4) Referrals-  None    5) Dispo- Return to clinic in 3 months or sooner if would like to discuss medications for ADHD (pending results of evaluation).       PERTINENT BACKGROUND                           [most recent eval 07/12/21]   Previous diagnoses include generalized anxiety disorder and mild major depressive disorder. Experienced symptoms of anxiety throughout her life, but noticeably worsened after birth of her firstborn. She has gotten past psychiatric care through her primary care provider and did not start any psychiatric medications until she finished nursing her youngest child around 2017. She first experienced depression at that time. Most recently, her 2nd oldest (son) was diagnosed with ADHD and responded well to treatment.    Psych pertinent item history includes mutiple psychotropic trials      SUBJECTIVE   Last seen on 4/14/21:  - Increased citalopram to 30 mg daily  - Discussed finding therapist  - Referred for ADHD neuropsych testing    - \"Doing well\" and feeling good  - Increased dose of citalopram. No side effects.  - Fewer days where feeling hopeless and down.  - Does not think that she is noticing this at all.   - No anhedonia.  - No SI.  - Less anxiety  - Still notices " anxiety. Went down 20%. Notices anxiety about a lot of different things. Always thinking about what needs to be done next.    - Sleeping well as long as she gets enough. Sleeping through the night.    - ADHD testing still in progress  - Will review report on 7/20    - Has not found a therapist.     - Summer is going well.    RECENT PSYCH ROS:   Depression: Denied feeling depressed, anhedonia, and suicidal ideation.   Elevated:  not evaluated  Psychosis:  Denied auditory and visual hallucinations.  Anxiety:  excessive worry  Trauma Related:  N/A no trauma history    Adverse Effects: Denied nausea, drowsiness, and insomnia.   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       Living situation- Lives in home with  and 4 children      Social/ Spiritual Support- , friends, sister, and mom      Feels Safe at Home- yes   Legal- None     Trauma History (self-report)- None  Early History/Education- Grew up in [place], [place]. Remembers being busy with friends in childhood. Hated that parents were . This was really hard for her early in divorce. Separation anxiety with mom. Otherwise had a happy childhood. Reported no known developmental milestone issues or difficulty with school that required special education plans. Highest level of education was completion of an [employer].        PSYCHIATRIC HISTORY     SIB- None  Suicide Attempt [#,  most recent]- None  Suicidal Ideation Hx- None    Violence/Aggression Hx- None  Psychosis Hx- None  Eating Disorder Hx- None    Psych Hosp [#, most recent]- None  Commitment- None  ECT- None  Outpatient Programs - None      PAST MED TRIALS      Medication Max Dose (mg) Dates / Duration Helpful? DC Reason / Adverse Effects?   Citalopram 30 mg  Current Y    Bupropion 150 mg? December 2019  Worsened anxiety   Sertraline 100-150 mg? 11/2017-7/2019 Y Benefits diminished over time                                                       SUBSTANCE USE HISTORY     Past Use- Denies history of substance use disorders.   Treatment- #, most recent- N/A  Medical Consequences- N/A  Legal Consequences- N/A     MEDICAL HISTORY and ALLERGY     ALLERGIES: No known drug allergies    Patient Active Problem List   Diagnosis     Allergic rhinitis     Other type of migraine     iamCERVICAL DISC DISPLACMNT     Nonallopathic lesion of cervical region     Plantar fasciitis, right     Past Medical History:   Diagnosis Date     Allergic rhinitis, cause unspecified     AIT     Anxiety     Saw therapist at  in [place] over summer 2015.  Doing well now.  Also some marriage therpay in the past.  Tried prozac last spring and another med and didn't like the side effects -- stopped within 1 mo of starting.      Menstrual migraine      Other forms of migraine, without mention of intractable migraine without mention of status migrainosus      Rh negative state in antepartum period 4/20/2016    Rhophylac given 3/7/16     Seasonal allergies      Varicella         MEDICAL REVIEW OF SYSTEMS   Contraception- oral contraceptives (combined)    Denied nausea, drowsiness, and insomnia.   Stress incontinence - has an appointment with urology     MEDICATIONS     Current Outpatient Medications   Medication Sig Dispense Refill     CALCIUM PO        CHLOROQUINE PHOSPHATE 500 MG OR TABS one tablet weekly starting one week prior to departure and continuing for 4  "weeks after return 6 0     Cholecalciferol (VITAMIN D3 PO)        citalopram (CELEXA) 20 MG tablet Take 1.5 tablets (30 mg) by mouth daily *SCHEDULE APPT. FOR FURTHER REFILLS [phone]* 45 tablet 0     Ferrous Sulfate (IRON PO)        levonorgest-eth estrad 91-Day (SEASONIQUE) 0.15-0.03 &0.01 MG tablet Take 1 tablet by mouth       Multiple Vitamin (MULTIVITAMIN ADULT PO)        Omega-3 1000 MG capsule Take 2 g by mouth       ZYRTEC 10 MG OR TABS 1 TABLET DAILY 30 prn      VITALS   There were no vitals taken for this visit.    MENTAL STATUS EXAM     Alertness: alert   Appearance: casually groomed  Behavior/Demeanor: cooperative, pleasant and calm, with good  eye contact   Speech: normal and regular rate and rhythm  Language: intact and no problems  Psychomotor: normal or unremarkable  Mood: \"good\"  Affect: full range; congruent to: mood- yes, content- yes  Thought Process/Associations: unremarkable  Thought Content:  Reports none;  Denies suicidal ideation and violent ideation  Perception:  Reports none;  Denies auditory hallucinations and visual hallucinations  Insight: good  Judgment: good  Cognition: does  appear grossly intact; formal cognitive testing was not done  Gait and Station: N/A (telehealth)     LABS and DATA     PHQ9 TODAY = not obtained  PHQ 12/1/2020 2/17/2021 5/19/2021   PHQ-9 Total Score 1 6 5   Q9: Thoughts of better off dead/self-harm past 2 weeks Not at all Not at all Not at all       Recent Labs   Lab Test 07/19/18  1120   CR 0.73   GFRESTIMATED >60     Recent Labs   Lab Test 07/19/18  1120   AST 20   ALT 14   ALKPHOS 58        PSYCHOTROPIC DRUG INTERACTIONS   Per UpToDate:  Omega-3 fatty acids may increase the antiplatelet effect of citalopram.    MANAGEMENT:  Monitoring for adverse effects     RISK STATEMENT for SAFETY     Angelic Marrero did not appear to be an imminent safety risk to self or others.    TREATMENT RISK STATEMENT: The risks, benefits, alternatives and potential adverse " effects have been discussed and are understood by the pt. The pt understands the risks of using street drugs or alcohol. There are no medical contraindications, the pt agrees to treatment with the ability to do so. The pt knows to call the clinic for any problems or to access emergency care if needed.  Medical and substance use concerns are documented above.  Psychotropic drug interaction check was done, including changes made today.    PROVIDER: Tara Carbone MD    Patient staffed in clinic with Dr. Lozano who will sign the note.  Supervisor is Dr. Sanford.      TELEHEALTH ATTENDING ATTESTATION  Following the ACGME guidelines on telemedicine and direct supervision due to COVID-19, I was concurrently participating in and/or monitoring the patient care through appropriate telecommunication technology.  I discussed the key portions of the service with the resident, including the mental status examination and developing the plan of care. I reviewed key portions of the history with the resident. I agree with the findings and plan as documented in this note.   Anthony Lozano MD

## 2021-07-12 NOTE — PROGRESS NOTES
"VIDEO VISIT  Angelic Marrero is a 40 year old patient who is being evaluated via a billable video visit.      The patient has been notified of following:   \"This video visit will be conducted via a call between you and your physician/provider. We have found that certain health care needs can be provided without the need for an in-person physical exam. This service lets us provide the care you need with a video conversation. If a prescription is necessary we can send it directly to your pharmacy. If lab work is needed we can place an order for that and you can then stop by our lab to have the test done at a later time. Insurers are generally covering virtual visits as they would in-office visits so billing should not be different than normal.  If for some reason you do get billed incorrectly, you should contact the billing office to correct it and that number is in the AVS .    Video Conference to be completed via:  [place]    Patient has given verbal consent for video visit?:  Yes    Patient would prefer that any video invitations be sent by: Send to e-mail at: [email or web address]      How would patient like to obtain AVS?:  Andry    AVS SmartPhrase [PsychAVS] has been placed in 'Patient Instructions':  Yes    "

## 2021-07-12 NOTE — TELEPHONE ENCOUNTER
On 7/12/2021, at 8:50, writer called patient at [phone] to confirm Virtual Visit. Writer unable to make contact with patient. Writer left detailed voice message for call back. [phone] left as call back number. Lisa Kendall, [facility]

## 2021-07-12 NOTE — PATIENT INSTRUCTIONS
**For crisis resources, please see the information at the end of this document**     Patient Education      Thank you for coming to the [facility] [place] [facility].    Lab Testing:  If you had lab testing today and your results are reassuring or normal they will be mailed to you or sent through SOLO within 7 days. If the lab tests need quick action we will call you with the results. The phone number we will call with results is # [phone] (home) . If this is not the best number please call our clinic and change the number.    Medication Refills:  If you need any refills please call your pharmacy and they will contact us. Our fax number for refills is [phone]. Please allow three business for refill processing. If you need to  your refill at a new pharmacy, please contact the new pharmacy directly. The new pharmacy will help you get your medications transferred.     Scheduling:  If you have any concerns about today's visit or wish to schedule another appointment please call our office during normal business hours [phone] (8-5:00 M-F)    Contact Us:  Please call [phone] during business hours (8-5:00 M-F).  If after clinic hours, or on the weekend, please call  [phone].    Financial Assistance [phone]  [place] Billing [phone]  Central Billing Office, [facility]: [phone]  [place] Billing [phone]  Medical Records [phone]  [place] Patient Bill of Rights [email or web address]       MENTAL HEALTH CRISIS NUMBERS:  For a medical emergency please call  911 or go to the nearest ER.     [facility]:   [facility] -[phone]   Crisis Residence [facility] -[phone]   Walk-In Counseling Center [facility] -[phone]   COPE 24/7 [place] Mobile Team -[phone] (adults)/[phone] (child)  CHILD: [facility] needs assessment  team - [phone]      [facility]:   [facility] - [phone]   Walk-in counseling [facility] - [phone]   Walk-in counseling [facility] - [phone]   Crisis Residence [facility] - [phone]  Urgent Care Adult Mental Mmpvjh-[phone] mobile unit/ 24/7 crisis line    National Crisis Numbers:   National Suicide Prevention Lifeline: 8-107-104-TALK (019-802-2031)  Poison Control Center - 7-718-382-2666  CloudAccess/resources for a list of additional resources (SOS)  Trans Lifeline a hotline for transgender people 1-944.960.9169  The [employer] Project a hotline for LGBT youth 1-865-155-8004  Crisis Text Line: For any crisis 24/7   To: 012100  see www.crisistextline.org  - IF MAKING A CALL FEELS TOO HARD, send a text!         Again thank you for choosing [facility] MENTAL HEALTH & ADDICTION [facility] and please let us know how we can best partner with you to improve you and your family's health.    You may be receiving a survey regarding this appointment. We would love to have your feedback, both positive and negative. The survey is done by an external company, so your answers are anonymous.

## 2021-07-13 ENCOUNTER — DOCUMENTATION ONLY (OUTPATIENT)
Dept: PSYCHOLOGY | Facility: CLINIC | Age: 41
End: 2021-07-13

## 2021-07-13 DIAGNOSIS — F41.1 GENERALIZED ANXIETY DISORDER: ICD-10-CM

## 2021-07-13 RX ORDER — CITALOPRAM HYDROBROMIDE 20 MG/1
30 TABLET ORAL DAILY
Qty: 135 TABLET | Refills: 0 | Status: SHIPPED | OUTPATIENT
Start: 2021-07-13 | End: 2021-10-12

## 2021-07-13 NOTE — PROGRESS NOTES
- Received request from Saba to send in a 90-day prescription for citalopram as this is preferred by her insurance company.  - Called pharmacy to cancel prescription sent in yesterday for 30-day supply with three refills.  - Placed electronic prescription for 90-day supply of citalopram 30 mg daily with no refills.  - Plan is for Saba to return to clinic in 3 months for a follow-up visit (or sooner pending results of ADHD assessment).    Case discussed with attending psychiatrist Dr. Myrick.    Tara Carbone MD  Psychiatry PGY-3

## 2021-07-13 NOTE — PROGRESS NOTES
[facility]  ADHD Evaluation     Patient: Saba Marrero   YOB: 1980  MRN: [id number]    Date(s) of assessment: Diagnostic Assessment (5/27/21, 6/9/21), Ashley self-report and collateral measures scored and interpreted (7/13/21), MMPI -2 (administered on 6/9/21, interpreted on 6/9/21)      Information about appointment:  Client attended two sessions to aid in determining client's mental health diagnosis or diagnoses and treatment recommendations that best address client concerns. Available medical records were reviewed. There were no previous psychological evaluations for review. A diagnostic assessment was conducted at the initial appointment. Client completed several rating scales to assist in assessing attention-related and other mental health symptoms that may be causing impairments in functioning. Rating scales were also completed by a collateral contact. Client also completed personality testing to aid in diagnostic clarification.     Assessment tools:   Ashley Adult ADHD Rating Scale-IV: Self and Other Reports (BAARS-IV), Ashley Functional Impairment Scale: Self and Other Reports (BFIS), Ashley Deficits in Executive Functioning Scale: Self and Other Reports (BDEFS), Patient Health Questionnaire-9 (PHQ-9), Generalized Anxiety Disorder-7 (NAYELY-7) and [place] Multiphasic Personality Inventory - Second Edition (MMPI-2) *Testing administered remotely     Assessment Results:     Behavioral Observations:  Client arrived on time to each session. She was pleasant and cooperative at all times. She did not demonstrate difficulty with inattention or hyperactivity/impulsivity during sessions. The following results are likely to be an accurate reflection of Client's current functioning.     Ashley Adult ADHD Rating Scale-IV: Self and Other Reports (BAARS-IV)  The BAARS-IV assesses for symptoms of ADHD that are experienced in one's daily life. This assessment measure includes self and  "collateral rating scales designed to provide information regarding current and childhood symptoms of ADHD including inattention, hyperactivity, and impulsivity. Self-report scores are reported as percentiles. Scores at the 76th-83rd percentile are considered marginal, scores at the 84th-92nd percentile are considered borderline, scores at the 93rd-95th percentile are considered mild, scores at the 96th-98th percentile are considered moderate, and those at the 99th percentile are considered severe. Collateral or \"other\" rating scales are reported as number of symptoms observed in comparison to those reported by the client. Norms and percentile scores are not available for collateral reports.      Current Symptoms Scale--Self Report:   Client completed the self-report inventory of current symptoms. The results indicate that the client's Total ADHD Score was 45 which places her in the 98th percentile for overall ADHD symptoms. In addition, the client endorsed the following occur \"often\" or \"very often\": 8/9 (99th percentile) Inattention symptoms, 1/9 (86th percentile) Hyperactivity/Impulsivity symptoms, and 5/9 (94th percentile) Sluggish Cognitive Tempo symptoms. Client indicated that the reported symptoms have resulted in impaired functioning in home and social relationships. Overall, the results suggest the client is reporting severe symptoms of inattention at this time.      Current Symptoms Scale--Other Report:  Client's  completed the collateral report inventory of current symptoms. Based on the collateral contact's observation of symptoms, the client demonstrates the following \"often\" or \"very often\": 7/9 Inattention symptoms, 0/5 Hyperactivity symptoms, 0/4 Impulsivity symptoms, and 5/9 Sluggish Cognitive Tempo symptoms. The client's Total ADHD Score was 39. The collateral- and self-report scores are similar and suggest she experiences symptoms of inattention at this time.      Childhood Symptoms " "Scale--Self-Report:  Client completed the self-report inventory of childhood symptoms. The results indicate that the client's Total ADHD Score was 32 which places her in the 79th percentile for overall ADHD symptoms in childhood. In addition, the client endorsed having experienced the following \"often\" or \"very often\": 3/9 (91st percentile) Inattention symptoms and 0/9 (1-75th percentile) Hyperactivity-Impulsivity symptoms. Overall, the results suggest the Client reported experiencing borderline symptoms of inattention in childhood.      Childhood Symptoms Scale--Other Report:  Client's mother completed the collateral report inventory of childhood symptoms. Based on the collateral contact's recollection of client's childhood symptoms, the client demonstrated the following \"often\" or \"very often\": 2/9 Inattention symptoms and 0/9 Hyperactivity-Impulsivity symptoms. The client's Total ADHD Score was 26. The collateral-report and self-report scores are similar and suggest Client did not experience significant symptoms of inattention or hyperactivity/impulsivity in childhood.          Ashley Functional Impairment Scale: Self and Other Reports (BFIS)  The BFIS is used to assess an individuals' psychosocial impairment in major life/daily activities that may be due to a mental health disorder. This assessment measure includes self and collateral rating scales. Self-report scores are reported as percentiles. Scores at the 76th-83rd percentile are considered marginal, scores at the 84th-92nd percentile are considered borderline, scores at the 93rd-95th percentile are considered mild, scores at the 96th-98th percentile are considered moderate, and those at the 99th percentile are considered severe. Collateral or \"other\" rating scales are reported as number of symptoms observed in comparison to those reported by the client. Norms and percentile scores are not available for collateral reports.      Results indicate the client " "identified impairment (scores at or greater than 93rd percentile) in the following areas: home-family, home-chores, money management, daily responsibilities, and self-care routines. The client's Mean Impairment Score was 4.62 (86th percentile) indicating the client is reporting borderline impairment in functioning across domains. Client's  completed the collateral rating scale, which indicated somewhat discrepant results. His scores were generally lower (e.g., Mean Impairment Score of 3.42). Her  noted impairment in the areas of: home-chores, home-family and daily responsibilities.       Ashley Deficits in Executive Functioning Scale (BDEFS)  The BDEFS is a measure used for evaluating dimensions of adult executive functioning in daily life. This assessment measure includes self and collateral rating scales. Self-report scores are reported as percentiles. Scores at the 76th-83rd percentile are considered marginal, scores at the 84th-92nd percentile are considered borderline, scores at the 93rd-95th percentile are considered mild, scores at the 96th-98th percentile are considered moderate, and those at the 99th percentile are considered severe. Collateral or \"other\" rating scales are reported as number of symptoms observed in comparison to those reported by the client. Norms and percentile scores are not available for collateral reports.      Results indicate the client's Total Executive Functioning Score was 188 (92nd percentile). The ADHD-Executive Functioning Index score was 25 (93rd percentile). These scores suggest the client has borderline to mild deficits in executive functioning. These deficits may be due to ADHD or another mental health disorder. Results indicate the client identified significant deficits in the following area: self-management to time (moderate); self-organization/problem-solving (mild); and self-motivation (moderate). Client's  completed the collateral rating scale, " which indicated discrepant results. His scores were considerably lower; he only noted a deficit in the areas of self-management to time.      Summary of [place] Multiphasic Personality Inventory--Second Edition   Client completed the [place] Multiphasic Personality Inventory-2 (MMPI-2), a self-report personality inventory, as part of her evaluation. Validity scales indicate that the client responded in an open and consistent manner, resulting in a valid profile. The following results are likely to be an accurate reflection of client's current functioning. Client s responses suggest that she is reporting a high level of general emotional distress. Individuals with similar profiles experience depression with anxiety, tension, apprehension and worry. They report lack of energy, drive, interest, and motivation for coping with problems. They may also experience vegetative symptoms of depression such as anhedonia, sleep disturbance, and loss of interest, energy and motivation. They may experience a sense of mental failure or decline and the depletion of energy needed to accomplish mental work. Thinking and problem-solving are experienced as effortful and as subject to going off course even when significant effort is made. Thinking may be viewed as impaired or unreliable; they may have a sense that  I can t seem to get my mind right.  They may seek to avoid others (both individuals and groups) because they feel uneasy and awkward in such situations and because they say they are happier being alone. They report impediments to performance in work including fatigue, apathy, feeling overwhelmed, distractibility and indecision.      Generalized Anxiety Disorder Questionnaire (NAYELY-7)  This questionnaire is designed to screen for anxiety in adults. Based on the client's score of 4, she is not reporting significant symptoms of anxiety at this time.      Patient Health Questionnaire- 9 (PHQ-9)   This questionnaire is designed  "to screen for depression in adults. Based on the client's score of 5, she is reporting mild symptoms of depression at this time. She endorsed the following symptoms of depression: little interest or pleasure in doing things; feeling down/depressed/hopeless; feeling tired or having little energy, and poor concentration.    Summary (based on clinical interview, review of records, test results):  Client is a 40-year-old, ,  female. Client was referred for a diagnostic assessment by PCP. The purpose of this evaluation is to: provide treatment recommendations and clarify diagnosis. Client's presenting concerns include:  She struggles with starting chores and getting started. She often feels \"frozen.\" She doesn't finish things (\"this has been my whole life\"). She feels that trying to keep her house in order is a challenge. She can't keep up with tasks and would rather sit. It can feel overwhelming and she doesn't know where to start. She is disorganized and can't find things; she misplaces and loses things. Each surface of the home is covered in \"stuff.\" She can't find her keys, phone, or wallet. She has locked her keys in her car so often that she had to get AAA because she was using road side assistance so much that her insurance rates were going up. She noted that her  is a complete \"slob\" and struggles with the same issues. She is forgetful regarding bills (uses auto pay), appointments, children s activities. She tries to use reminders in her phone so that she doesn't miss things. She might zone out during conversations and has difficulty listening. She is fidgety and restless and feels she needs to be doing something constantly. If she is trying to relax she is getting up to do dishes or shop for groceries or looking at something on her phone. She is always thinking about the next thing she needs to be researching or buying, etc. Client feels that she is feeling overloaded since being in charge " "of the household and managing her kids. She might forget their appointments and sometimes she misses them. If she gets busy with something, she forgets she has somewhere to be. She is trying to use alarms/reminders but doesn't always make it to everything. She is overwhelmed with messes/chores/laundry. She is always buying birthday gifts on the way to birthday parties and is late for each party. She feels she doesn't plan well despite thinking ahead. 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 She felt she didn't have a good strategy for managing task lists. She would put things to the bottom of the list and never get to them. She was good at her job but felt she wasn't organized. She felt overwhelmed there too. Client stated that symptoms have resulted in the following functional impairments: childcare / parenting and management of the household and or completion of tasks. Client reported that she has not completed a previous ADHD diagnostic assessment. Client has not received a previous diagnosis of ADHD. Client reported that medication has not been prescribed medication to address these problems. Client reported that these problem(s) began in childhood (\"my whole life\"). Client has not attempted to resolve these concerns in the past. Client reported that other professionals are involved in providing support / services. Client is prescribed Celexa by PCP.    Client reported the following previous diagnoses which includes: Anxiety Disorder and Depression.  Client reported symptoms began in childhood (she felt anxious/worried/nervous a lot as a kid). Client has been treating these conditions since having children. She had her fourth child in 2016 and noted that she " "started to treat anxiety at this time. She thinks depression started after this. Client has received mental health services in the past: medication from PCP and counseling. Client worked with two therapists and never felt she made progress or \"clicked\" with them. The most recent therapy was about three years ago. She felt medication was sufficient. Psychiatric Hospitalizations: None. Client denies a history of civil commitment. Currently, Client is receiving other mental health services. These include medication from PCP. Client is prescribed Celexa by PCP. Client feels this is working okay for her at this time. Client did not report a personal history of chemical dependence.      Client reported she grew up in [place], [place]. She was raised by her biological parents. Her parents  when she was in second grade. This was a surprise and was a \"huge loss.\" She always wished it was different. This was \"really hard\" and she had a lot of separation anxiety when she had to leave her mother when she had to go to her dad's house. She also didn't like going to sleep overs or being away from her mother. She was the second born of four children. She has one older brother and a younger brother and a younger sister. Client reported that their childhood was \"good.\" She felt safe and loved and supported. She recalls being alone a lot after school (\"latch key kid, watching a lot of TV). She was actively involved in sports and was in something each season.  She spent a lot of time with her aunt because she disliked being with her dad (\"he was a nice flori and didn't do anything wrong, but it just wasn't the same as being with my mom\"). Client described their current relationships with family of origin as close with parents and sister. Client reported the following relationship history: one marriage. Client's current relationship status is  for 16 years. They have dated since Client was 15 years old.  Client identified " "their sexual orientation as heterosexual.  Client reported having three children ages 13 (son), 10 (son), 8 (daughter), and 5 (son). Her son is 10 and has been diagnosed with ADHD and anxiety. Three of her kids are on mediations for anxiety. Client identified mother, siblings and spouse as part of their support system.  Client identified the quality of these relationships as stable and meaningful.      As a child, client reported that she failed to complete assigned chores in the home environment, had problems with organization and keeping track of items and misplaced or lost things. Client reported no difficulty with childhood peer relationships.  As a child, client reported having regular and consistent sleep patterns.  Client reported currently experiencing regular and consistent sleep patterns.  Client reported sleeping approximately 7-8 hours per night. She has phases where she might wake up in the middle of the night and have trouble falling back asleep. Client reported that she has not completed a sleep study.      Client's highest education level was graduate school. Client graduated high school in 1998. She estimated she obtained mostly As and Bs. During the elementary, middle, and high school years, Client recalls academic strengths in the area of math. Client reported experiencing academic problems in none (quit calculus because it got to be too hard). Client did not identify any learning problems. Client did not receive tutoring services during the school years. Client did not receive special education services. Client reported no particular problems during the school years. Client reported she was able to meet deadlines and turn things in on time. She would procrastinate and do things at the \"very last minute.\" She played sports and tried to do homework on the bus, on the bleachers, and before her games. She felt she worked best if she had an immediate deadline pressuring her to do work. She often " "doodled during class. She heard the teacher but felt bored and had to doodle to be able to pay attention. Client did attend post-secondary school. Client graduated from college ([employer] in [place], [place]) in 2003 (it took her 4.5 years) with a degree in business communication and marketing. She reported that she did well and obtained As and Bs in college. She got her Master of Business graduate degree in 2009 (she had her son before she graduated). She reported she did \"fine\" and didn't recall struggling too much during that time. She would put things off and, again, needed that pressure of a deadline to get things done.     Client reported that she currently stays home with her children. She has done this for 4 years. She used to work in marketing for 14 years. Client reported that she been frequently late for work, disorganized behavior and distractible behavior. Client feels that she is feeling overloaded since being in charge of the household and managing her kids. 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 She felt she didn't have a good strategy for managing task lists. She would put things to the bottom of the list and never get to them. She was good at her job but felt she wasn't organized. She felt overwhelmed there too. The client's work history includes: marketing (a few different jobs and then stayed with the same company for 12 years). The longest period of employment has been 12 years (she was \"laid off\"; the job was offered to a aby person for half the salary and it was going to be based in [place], so she decided to stay home). Client has not been terminated from a place of employment.      Results of testing were indicative of ADHD. " Rating scales suggest the Client is reporting symptoms of inattention that have been present since childhood. The collateral report (her ) was commensurate with Client s self-report and was suggestive of current symptoms of inattention. Client and her mother noted a few symptoms of inattention in childhood. Her description during the clinical interview was suggestive of such issues when she was young (e.g., not paying attention in class, difficulty organizing/completing tasks). Deficits in executive functioning were reported to be between the borderline to mild range. While impairment in functioning was only reported to be in the borderline range, Client explained that she struggled at work and is having difficulty at home as well (i.e., losing things, having difficulty managing family activities/calendars, disorganization, poor task completion). Self-report measures suggest Client experiences mild depression at this time. However, Client s endorsement of such symptoms included issues with attention and concentration. As such, a separate mood disorder diagnosis is not deemed warranted at this time. Personality testing was positive for depression, anxiety and issues with performance at work. Based on the results of clinical interview and psychological testing, the client currently meets criteria for diagnoses of Attention-Deficit/Hyperactivity Disorder, Predominantly Inattentive Presentation and Other Specified Anxiety Disorder. Client will be provided with the results of testing, diagnosis, and recommendations in her last appointment.      DSM5 Diagnoses: (Sustained by DSM5 Criteria Listed Above)    Attention-Deficit/Hyperactivity Disorder, Predominantly Inattentive Presentation (F90.)    Other Specified Anxiety Disorder (F41.9)    Psychosocial & Contextual Factors: family stress     Recommendations:    1. Schedule an appointment with your physician or psychiatrist to discuss a medication evaluation.  Medication can be very helpful in treating the symptoms of ADHD and anxiety. It will be important that each condition is treated, as treatment for one without the other may lead to an increase in symptoms (e.g., treating ADHD, but not anxiety, can lead to increased anxiety).    2. Access resources through websites, books, and articles such as those provided in the Coping with ADHD handout.    3. Consider working with an ADHD  or individual therapist to learn skills to assist with symptom management, as well as ways to improve relationships, etc., that may have been impacted by your symptoms.    4. Individual therapy is recommended. Therapies focused on identifying and challenging problematic thought and behavior patterns while increasing the use of healthy coping skills has been found to be effective in treating anxiety. It will be important to set goals in this therapy and work actively toward achieving short-term successes that lead to the completion of each goal. Action-oriented therapies, such as CBT and ACT are particularly recommended for the treatment of chronic anxiety.    5. The use of behavioral strategies such as diaphragmatic breathing, guided imagery, and mindfulness is often helpful in the management of anxiety symptoms.    6. You are welcome to schedule a follow-up appointment with me in about 6 weeks to review symptoms, treatment involvement, and struggles and/or successes.       Janna York, Ph.D., LP  Licensed Psychologist

## 2021-07-13 NOTE — PROGRESS NOTES
"Client Name: Saba Marrero  MRN: [id number]  : 1980    Ashley Adult ADHD Rating Scale-IV: Self and Other Reports (BAARS-IV)  The BAARS-IV assesses for symptoms of ADHD that are experienced in one's daily life. This assessment measure includes self and collateral rating scales designed to provide information regarding current and childhood symptoms of ADHD including inattention, hyperactivity, and impulsivity. Self-report scores are reported as percentiles. Scores at the 76th-83rd percentile are considered marginal, scores at the 84th-92nd percentile are considered borderline, scores at the 93rd-95th percentile are considered mild, scores at the 96th-98th percentile are considered moderate, and those at the 99th percentile are considered severe. Collateral or \"other\" rating scales are reported as number of symptoms observed in comparison to those reported by the client. Norms and percentile scores are not available for collateral reports.      Current Symptoms Scale--Self Report:   Client completed the self-report inventory of current symptoms. The results indicate that the client's Total ADHD Score was 45 which places her in the 98th percentile for overall ADHD symptoms. In addition, the client endorsed the following occur \"often\" or \"very often\": 8/9 (99th percentile) Inattention symptoms, 1/9 (86th percentile) Hyperactivity/Impulsivity symptoms, and 5/9 (94th percentile) Sluggish Cognitive Tempo symptoms. Client indicated that the reported symptoms have resulted in impaired functioning in home and social relationships. Overall, the results suggest the client is reporting severe symptoms of inattention at this time.      Current Symptoms Scale--Other Report:  Client's  completed the collateral report inventory of current symptoms. Based on the collateral contact's observation of symptoms, the client demonstrates the following \"often\" or \"very often\": 7/9 Inattention symptoms, 0/5 Hyperactivity " "symptoms, 0/4 Impulsivity symptoms, and 5/9 Sluggish Cognitive Tempo symptoms. The client's Total ADHD Score was 39. The collateral- and self-report scores are similar and suggest she experiences symptoms of inattention at this time.      Childhood Symptoms Scale--Self-Report:  Client completed the self-report inventory of childhood symptoms. The results indicate that the client's Total ADHD Score was 32 which places her in the 79th percentile for overall ADHD symptoms in childhood. In addition, the client endorsed having experienced the following \"often\" or \"very often\": 3/9 (91st percentile) Inattention symptoms and 0/9 (1-75th percentile) Hyperactivity-Impulsivity symptoms. Overall, the results suggest the Client reported experiencing borderline symptoms of inattention in childhood.      Childhood Symptoms Scale--Other Report:  Client's mother completed the collateral report inventory of childhood symptoms. Based on the collateral contact's recollection of client's childhood symptoms, the client demonstrated the following \"often\" or \"very often\": 2/9 Inattention symptoms and 0/9 Hyperactivity-Impulsivity symptoms. The client's Total ADHD Score was 26. The collateral-report and self-report scores are similar and suggest Client did not experience significant symptoms of inattention or hyperactivity/impulsivity in childhood.            Ashley Functional Impairment Scale: Self and Other Reports (BFIS)  The BFIS is used to assess an individuals' psychosocial impairment in major life/daily activities that may be due to a mental health disorder. This assessment measure includes self and collateral rating scales. Self-report scores are reported as percentiles. Scores at the 76th-83rd percentile are considered marginal, scores at the 84th-92nd percentile are considered borderline, scores at the 93rd-95th percentile are considered mild, scores at the 96th-98th percentile are considered moderate, and those at the 99th " "percentile are considered severe. Collateral or \"other\" rating scales are reported as number of symptoms observed in comparison to those reported by the client. Norms and percentile scores are not available for collateral reports.      Results indicate the client identified impairment (scores at or greater than 93rd percentile) in the following areas: home-family, home-chores, money management, daily responsibilities, and self-care routines. The client's Mean Impairment Score was 4.62 (86th percentile) indicating the client is reporting borderline impairment in functioning across domains. Client's  completed the collateral rating scale, which indicated somewhat discrepant results. His scores were generally lower (e.g., Mean Impairment Score of 3.42). Her  noted impairment in the areas of: home-chores, home-family and daily responsibilities.       Ashley Deficits in Executive Functioning Scale (BDEFS)  The BDEFS is a measure used for evaluating dimensions of adult executive functioning in daily life. This assessment measure includes self and collateral rating scales. Self-report scores are reported as percentiles. Scores at the 76th-83rd percentile are considered marginal, scores at the 84th-92nd percentile are considered borderline, scores at the 93rd-95th percentile are considered mild, scores at the 96th-98th percentile are considered moderate, and those at the 99th percentile are considered severe. Collateral or \"other\" rating scales are reported as number of symptoms observed in comparison to those reported by the client. Norms and percentile scores are not available for collateral reports.      Results indicate the client's Total Executive Functioning Score was 188 (92nd percentile). The ADHD-Executive Functioning Index score was 25 (93rd percentile). These scores suggest the client has borderline to mild deficits in executive functioning. These deficits may be due to ADHD or another mental " health disorder. Results indicate the client identified significant deficits in the following area: self-management to time (moderate); self-organization/problem-solving (mild); and self-motivation (moderate). Client's  completed the collateral rating scale, which indicated discrepant results. His scores were considerably lower; he only noted a deficit in the areas of self-management to time.     Generalized Anxiety Disorder Questionnaire (NAYELY-7)  This questionnaire is designed to screen for anxiety in adults. Based on the client's score of 4, she is not reporting significant symptoms of anxiety at this time.      Patient Health Questionnaire- 9 (PHQ-9)   This questionnaire is designed to screen for depression in adults. Based on the client's score of 5, she is reporting mild symptoms of depression at this time. She endorsed the following symptoms of depression: little interest or pleasure in doing things; feeling down/depressed/hopeless; feeling tired or having little energy, and poor concentration.

## 2021-07-14 PROBLEM — Z30.41 ORAL CONTRACEPTIVE PILL SURVEILLANCE: Status: RESOLVED | Noted: 2018-02-07 | Resolved: 2018-10-28

## 2021-07-14 PROBLEM — F32.A DEPRESSION: Status: RESOLVED | Noted: 2017-10-04 | Resolved: 2019-12-18

## 2021-07-20 ENCOUNTER — VIRTUAL VISIT (OUTPATIENT)
Dept: PSYCHOLOGY | Facility: CLINIC | Age: 41
End: 2021-07-20
Payer: COMMERCIAL

## 2021-07-20 DIAGNOSIS — F90.0 ATTENTION DEFICIT HYPERACTIVITY DISORDER, INATTENTIVE TYPE: ICD-10-CM

## 2021-07-20 DIAGNOSIS — F41.9 ANXIETY: Primary | ICD-10-CM

## 2021-07-20 PROCEDURE — 96131 PSYCL TST EVAL PHYS/QHP EA: CPT | Mod: GT | Performed by: PSYCHOLOGIST

## 2021-07-20 PROCEDURE — 96130 PSYCL TST EVAL PHYS/QHP 1ST: CPT | Mod: GT | Performed by: PSYCHOLOGIST

## 2021-07-20 NOTE — PROGRESS NOTES
Client Name: Saba Marrero Date: 7/20/21    Service Type: Individual (ADHD Evaluation feedback session)     Session Start Time: 9:00 Session End Time: 9:20      Session Length: 20 minutes      Session #: (feedback)     Attendees: Client attended alone     Telemedicine Visit: The patient's condition can be safely assessed and treated via synchronous audio and visual telemedicine encounter.      Reason for Telemedicine Visit: Services only offered telehealth    Originating Site (Patient Location): Patient's home    Distant Site (Provider Location): Provider Home Office    Consent:  The patient/guardian has verbally consented to: the potential risks and benefits of telemedicine (video visit) versus in person care; bill my insurance or make self-payment for services provided; and responsibility for payment of non-covered services.     Mode of Communication:  Video Conference via [employer]    As the provider I attest to compliance with applicable laws and regulations related to telemedicine.          DATA        Treatment Objective(s) Addressed in This Session:   Provided feedback on ADHD evaluation. Reviewed test results in depth and answered client's questions. Client diagnosed with Attention-Deficit/Hyperactivity Disorder, Predominantly Inattentive Presentation and Other Specified Anxiety Disorder. Reviewed ADHD Coping Skills Handout in session. This provider also completed full written report of evaluation, including integration of testing data, summary, and recommendations. Please see Documentation Only dated 7/13/21.     Progress on / Status of Treatment Objective(s) / Homework:   Completed      Intervention:  ADHD Evaluation feedback; Reviewed report (can be found in Documentation Only encounter dated 7/13/21); Client was appreciative of the feedback and expressed understanding of the diagnoses.          ASSESSMENT: Current Emotional / Mental Status (status of significant symptoms):  Risk status (Self / Other  harm or suicidal ideation)  Client denies current fears or concerns for personal safety.  Client denies current or recent suicidal ideation or behaviors.  Client denies current or recent homicidal ideation or behaviors.  Client denies current or recent self-injurious behavior or ideation.  Client denies other safety concerns.  A safety and risk management plan has not been developed at this time, however client was given the after-hours number / 911 should there be a change in any of these risk factors.     Appearance: Appropriate   Eye Contact: Good   Psychomotor Behavior: Normal    Attitude: Cooperative   Orientation: All  Speech  Rate / Production: Normal   Volume: Normal   Mood: Normal  Affect: Appropriate   Thought Content: Clear   Thought Form: Coherent Logical   Insight: Good      Medication Review:  Client is prescribed Celexa by PCP    Medication Compliance:  Yes     Changes in Health Issues:  None reported     Chemical Use Review:  Substance Use: Chemical use reviewed, no active concerns identified      Tobacco Use: No current tobacco use.      Collateral Reports Completed:  Routed note to Care Team Member(s)     PLAN: (Homework, other)       Recommendations:       1. Schedule an appointment with your physician or psychiatrist to discuss a medication evaluation. Medication can be very helpful in treating the symptoms of ADHD and anxiety. It will be important that each condition is treated, as treatment for one without the other may lead to an increase in symptoms (e.g., treating ADHD, but not anxiety, can lead to increased anxiety).     2. Access resources through websites, books, and articles such as those provided in the Coping with ADHD handout.     3. Consider working with an ADHD  or individual therapist to learn skills to assist with symptom management, as well as ways to improve relationships, etc., that may have been impacted by your symptoms.     4. Individual therapy is recommended.  Therapies focused on identifying and challenging problematic thought and behavior patterns while increasing the use of healthy coping skills has been found to be effective in treating anxiety. It will be important to set goals in this therapy and work actively toward achieving short-term successes that lead to the completion of each goal. Action-oriented therapies, such as CBT and ACT are particularly recommended for the treatment of chronic anxiety.     5. The use of behavioral strategies such as diaphragmatic breathing, guided imagery, and mindfulness is often helpful in the management of anxiety symptoms.     6. You are welcome to schedule a follow-up appointment with me in about 6 weeks to review symptoms, treatment involvement, and struggles and/or successes.      Janna York, Ph.D.,   Licensed Psychologist            Psychological Testing   Billing/Services Summary       Testing Evaluation Services Base: 51869  (1st 60 mins) Add-on: [id number]  (each addtl 60 mins)   Record Review and Clarify Referral Question   (8:45/9:00), (5/27/21) 15 minutes   Integration/Report Generation   (11:00/12:00), (6/9/21) - MMPI-2  (12:00/1:00) (7/13/21) - Ashley Scales  (3:00/4:00), (7/13/21) - Report 60 minutes  60 minutes  60 minutes     Interactive Feedback Session  (9:00/9:20), (7/20/21) 20 minutes   Total Time: 215 minutes (3 hours, 35 minutes)   Total Units: 1 3           Diagnosis(es): (ICD-10)  Attention-Deficit/Hyperactivity Disorder, Predominantly Inattentive Presentation (F90.)  Other Specified Anxiety Disorder (F41.9)

## 2021-07-22 ENCOUNTER — VIRTUAL VISIT (OUTPATIENT)
Dept: PSYCHIATRY | Facility: CLINIC | Age: 41
End: 2021-07-22
Attending: PSYCHIATRY & NEUROLOGY
Payer: COMMERCIAL

## 2021-07-22 DIAGNOSIS — F33.42 RECURRENT MAJOR DEPRESSIVE DISORDER, IN FULL REMISSION (H): ICD-10-CM

## 2021-07-22 DIAGNOSIS — F41.1 GENERALIZED ANXIETY DISORDER: ICD-10-CM

## 2021-07-22 DIAGNOSIS — F90.0 ADHD (ATTENTION DEFICIT HYPERACTIVITY DISORDER), INATTENTIVE TYPE: Primary | ICD-10-CM

## 2021-07-22 PROCEDURE — 99214 OFFICE O/P EST MOD 30 MIN: CPT | Mod: GC | Performed by: STUDENT IN AN ORGANIZED HEALTH CARE EDUCATION/TRAINING PROGRAM

## 2021-07-22 RX ORDER — DEXTROAMPHETAMINE SACCHARATE, AMPHETAMINE ASPARTATE MONOHYDRATE, DEXTROAMPHETAMINE SULFATE AND AMPHETAMINE SULFATE 2.5; 2.5; 2.5; 2.5 MG/1; MG/1; MG/1; MG/1
10 CAPSULE, EXTENDED RELEASE ORAL DAILY
Qty: 14 CAPSULE | Refills: 0 | Status: SHIPPED | OUTPATIENT
Start: 2021-07-22 | End: 2021-08-05

## 2021-07-22 ASSESSMENT — PAIN SCALES - GENERAL: PAINLEVEL: NO PAIN (0)

## 2021-07-22 NOTE — PROGRESS NOTES
"VIDEO VISIT  Angelic Marrero is a 40 year old patient who is being evaluated via a billable video visit.      The patient has been notified of following:   \"This video visit will be conducted via a call between you and your physician/provider. We have found that certain health care needs can be provided without the need for an in-person physical exam. This service lets us provide the care you need with a video conversation. If a prescription is necessary we can send it directly to your pharmacy. If lab work is needed we can place an order for that and you can then stop by our lab to have the test done at a later time. Insurers are generally covering virtual visits as they would in-office visits so billing should not be different than normal.  If for some reason you do get billed incorrectly, you should contact the billing office to correct it and that number is in the AVS .    Video Conference to be completed via:  [place]    Patient has given verbal consent for video visit?:  Yes    Patient would prefer that any video invitations be sent by: Send to e-mail at: [email or web address]      How would patient like to obtain AVS?:  Apertio    AVS SmartPhrase [PsychAVS] has been placed in 'Patient Instructions':  Yes    "

## 2021-07-22 NOTE — PATIENT INSTRUCTIONS
A member of our team will call you in two weeks to check-in regarding the current dose of your medication. If you would like to be seen in person, we can schedule an appointment for around that time. We will make further dose adjustments at that time if needed.        **For crisis resources, please see the information at the end of this document**     Patient Education      Call clinic if you experience any of the symptoms listed below as they may be signs of serotonin syndrome:  confusion   tremor  severe headache  sweats  fever   funny feeling in muscles  need to pace / restlessness   severe nausea, diarrhea      Thank you for coming to the [facility] [place] [facility].    Lab Testing:  If you had lab testing today and your results are reassuring or normal they will be mailed to you or sent through [employer] within 7 days. If the lab tests need quick action we will call you with the results. The phone number we will call with results is # [phone] (home) . If this is not the best number please call our clinic and change the number.    Medication Refills:  If you need any refills please call your pharmacy and they will contact us. Our fax number for refills is [phone]. Please allow three business for refill processing. If you need to  your refill at a new pharmacy, please contact the new pharmacy directly. The new pharmacy will help you get your medications transferred.     Scheduling:  If you have any concerns about today's visit or wish to schedule another appointment please call our office during normal business hours [phone] (8-5:00 M-F)    Contact Us:  Please call [phone] during business hours (8-5:00 M-F).  If after clinic hours, or on the weekend, please call  [phone].    Financial Assistance [phone]  [facility] Billing [phone]  Central Billing Office, [facility]: [phone]  [place] Billing [phone]  Medical Records  [phone]  [place] Patient Bill of Rights [email or web address]       MENTAL HEALTH CRISIS NUMBERS:  For a medical emergency please call  911 or go to the nearest ER.     [facility]:   [facility] -[phone]   Crisis Residence [facility] Residence -[phone]   Walk-In Counseling Center [facility] -[phone]   COPE 24/7 [place] Mobile Team -[phone] (adults)/[phone] (child)  CHILD: [facility] needs assessment team - [phone]      [facility]:   [facility] - [phone]   Walk-in counseling [facility] - [phone]   Walk-in counseling [facility] - [phone]   Crisis Residence [facility] Residence - [phone]  [facility]gent Care Adult Mental [facility]-[phone] mobile unit/ 24/7 crisis line    National Crisis Numbers:   National Suicide Prevention Lifeline: 5-662-156-TALK (642-772-8488)  Poison Control Center - 2-279-237-7921  [employer]/resources for a list of additional resources (SOS)  Trans Lifeline a hotline for transgender people 9-064-542-9439  The [employer] a hotline for LGBT youth 1-600.341.7047  Crisis Text Line: For any crisis 24/7   To: 754405  see www.crisistextline.org  - IF MAKING A CALL FEELS TOO HARD, send a text!         Again thank you for choosing [facility] and please let us know how we can best partner with you to improve you and your family's health.    You may be receiving a survey regarding this appointment. We would love to have your feedback, both positive and negative. The survey is done by an external company, so your answers are anonymous.

## 2021-07-22 NOTE — PROGRESS NOTES
Video- Visit Details  Type of service:  video visit for medication management  Time of service:    Date:  07/22/2021    Video Start Time:  8:03 AM        Video End Time:  8:34 AM    Reason for video visit:  Patient unable to travel due to Covid-19  Originating Site (patient location):  [facility]- [place]   Location- Patient's home  Distant Site (provider location):  [facility]  Mode of Communication:  Video Conference via AmWell  Consent:  Patient has given verbal consent for video visit?: Yes        [facility]  Psychiatry Clinic  MEDICAL PROGRESS NOTE     CARE TEAM:  PCP- [facility], Psychotherapist- None    Angelic Marrero is a 40 year old who prefers the name Saba and uses pronouns she, her, hers.      DIAGNOSIS   Attention deficit hyperactivity disorder, predominantly inattentive presentation  Generalized anxiety disorder  Major depressive disorder, recurrent, full remission       ASSESSMENT   Saba returns for a follow-up visit today after receiving a diagnosis of attention deficit hyperactivity disorder, predominantly inattentive presentation (see 07/13/21 progress note by Dr. Janna York). She is interested in pursuing both medication therapy and cognitive behavioral therapy for her ADHD. We discussed the risks and benefits of stimulant vs non-stimulant medication. I suggested Adderall XR given its long-acting nature and that stimulants are typically first-line therapy for adults with ADHD. She does not have a substance use disorder history or other medical conditions that are contraindications to treatment with a stimulant. We will start with 10 mg daily and someone from our team will connect with Saba in two weeks to check in on her symptoms and if she is having side effects. Based on how she is doing, we will modify her medication regimen if needed. She may also come into clinic if she feels that she would need to be seen in person, however it is  "not crucial at this point. There is also an option for her to come into clinic between visits to have her vital signs checked if needed (she reports that she historically has low blood pressure and heart rate, which is consistent with the last set of vitals in our system from 2020). We will also provide Saba with referrals for possible CBT ADHD therapists in the community. Saba was counseled regarding the risks of allergic reaction, heart attack, signs of high blood pressure, and signs of stroke. She was also counseled about disruptions in sleep, appetite, and irritability. We also provided education regarding the increased risk of serotonin syndrome with the combination of citalopram and Adderall.     MNPMP review was not needed today.     PLAN                                                                                                                1) Meds-  - Start Adderall XR 10 mg daily.  - Continue citalopram 30 mg daily.    2) Psychotherapy-  - Will provide list of therapists who do CBT for ADHD in the community (via [employer]).  - May consider joining the waiting list for CBT through our learner clinic.  - Discussed using Psychology Today database to look for a therapist.    3) Next due-  Labs- N/A  EKG- N/A  Rating scales- PHQ9 and NAYELY-7 at next visit    4) Referrals-  Therapy- Will provide list of therapists who do CBT for ADHD in the community (via [employer]).    5) Dispo- Has return visit scheduled for 10/12/21. Will be in contact with someone from our team in the next two weeks and may return to clinic earlier PRN.       PERTINENT BACKGROUND                           [most recent eval 07/12/21]   Please see most recent DA for more pertinent background.    Psych pertinent item history includes mutiple psychotropic trials       SUBJECTIVE   Last visit was on 7/12/21: no medication changes made at that time.    Since the last visit:  - In terms of anxiety and depression feeling \"good\"  - Got results from " ADHD testing.   - Son has ADHD so is familiar with medications.  - Wants to try something; has taken bupropion in the past and that made anxiety worse.  - Not worried about blood pressure and heart rate.   - No history of glaucoma, cardiac disease, renal disease, or hepatic disease.  - No history of substance use disorder.   - Willing to try a stimulant  - Might hard to remember to take it.   - No sadness or depression. No thoughts of hurting self or ending life.  - No thoughts of hurting anyone else.  - Referral for CBT for ADHD (get referrals through [employer]).  - Notes that it takes a while to find a therapist that she works well with.     RECENT PSYCH ROS:   Depression: Negative for depression, sadness, and suicidal ideation.  Elevated: None elicited  Psychosis: None elicited  Anxiety: Feels anxiety is well-controlled.  Trauma Related: None elicited.  Sleep: None elicited.    Adverse Effects: None reported.  Pertinent Negative Symptoms: No suicidal ideation, self-injurious behavior/urges or violent ideation  Recent Substance Use:     Not reviewed, see data from 7/12/21 visit.     FAMILY and SOCIAL HISTORY                                 pt reported     Per 7/12/21 visit; not reviewed today.    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       Living situation- Lives in home with  and 4 children      Social/ Spiritual Support- , friends, sister, and mom       Feels Safe at Home- yes   Legal- None     Trauma History (self-report)- None  Early History/Education- Grew up in [place], [place]. Remembers being busy with friends in childhood. Hated that parents were . This was really hard for her early in divorce. Separation anxiety with mom. Otherwise had a happy childhood. Reported no known developmental  milestone issues or difficulty with school that required special education plans. Highest level of education was completion of an HIRA.    PSYCH and SUBSTANCE USE Critical Summary Points since July 2021 7/12/21: Transfer of care diagnostic assessment.  7/22/21: Started Adderall XR 10 mg daily (recently diagnosed with ADHD, predominantly inattentive presentation).     MEDICAL HISTORY and ALLERGY     ALLERGIES: No known drug allergies    Patient Active Problem List   Diagnosis     Allergic rhinitis     Other type of migraine     iamCERVICAL DISC DISPLACMNT     Nonallopathic lesion of cervical region     Plantar fasciitis, right        MEDICAL REVIEW OF SYSTEMS   Contraception- oral contraceptives (combined)    HEENT: Negative for headaches and blurry vision.  CARDIOVASCULAR: Negative for chest pain and palpitations.  : Positive for stress urinary incontinence.     MEDICATIONS     Current Outpatient Medications   Medication Sig Dispense Refill     amphetamine-dextroamphetamine (ADDERALL XR) 10 MG 24 hr capsule Take 1 capsule (10 mg) by mouth daily 14 capsule 0     CALCIUM PO        CHLOROQUINE PHOSPHATE 500 MG OR TABS one tablet weekly starting one week prior to departure and continuing for 4 weeks after return 6 0     Cholecalciferol (VITAMIN D3 PO)        citalopram (CELEXA) 20 MG tablet Take 1.5 tablets (30 mg) by mouth daily 135 tablet 0     Ferrous Sulfate (IRON PO)        levonorgest-eth estrad 91-Day (SEASONIQUE) 0.15-0.03 &0.01 MG tablet Take 1 tablet by mouth       Multiple Vitamin (MULTIVITAMIN ADULT PO)        Omega-3 1000 MG capsule Take 2 g by mouth       ZYRTEC 10 MG OR TABS 1 TABLET DAILY 30 prn      VITALS   There were no vitals taken for this visit.    MENTAL STATUS EXAM     Alertness: alert   Appearance: casually groomed  Behavior/Demeanor: cooperative, pleasant and calm, with good  eye contact   Speech: normal and regular rate and rhythm  Language: intact and no problems  Psychomotor: normal  or unremarkable  Mood: description consistent with euthymia  Affect: full range; congruent to: mood- yes, content- yes  Thought Process/Associations: unremarkable  Thought Content:  Reports none;  Denies suicidal ideation and violent ideation  Perception:  Reports none;  Denies none  Insight: good  Judgment: good  Cognition: does  appear grossly intact; formal cognitive testing was not done  Gait and Station: N/A (telehealth)     LABS and DATA     PHQ9 TODAY = not obtained today  PHQ 12/1/2020 2/17/2021 5/19/2021   PHQ-9 Total Score 1 6 5   Q9: Thoughts of better off dead/self-harm past 2 weeks Not at all Not at all Not at all       Recent Labs   Lab Test 07/19/18  1120   CR 0.73   GFRESTIMATED >60     Recent Labs   Lab Test 07/19/18  1120   AST 20   ALT 14   ALKPHOS 58        PSYCHOTROPIC DRUG INTERACTIONS   Per [employer]:   Concurrent use of ADDERALL XR and CITALOPRAM  may result in increased risk of serotonin syndrome.     MANAGEMENT:  Monitoring for adverse effects and patient is aware of risks     RISK STATEMENT for SAFETY     Angelic Marrero did not appear to be an imminent safety risk to self or others.    TREATMENT RISK STATEMENT: The risks, benefits, alternatives and potential adverse effects have been discussed and are understood by the pt. The pt understands the risks of using street drugs or alcohol. There are no medical contraindications, the pt agrees to treatment with the ability to do so. The pt knows to call the clinic for any problems or to access emergency care if needed.  Medical and substance use concerns are documented above.  Psychotropic drug interaction check was done, including changes made today.    PROVIDER: Tara Carbone MD    Patient staffed in clinic with Dr. Myrick who will sign the note.  Supervisor is Dr. Sanford.      TELEHEALTH ATTENDING ATTESTATION  Following the [facility] guidelines on telemedicine and direct supervision due to COVID-19, I was concurrently participating in  and/or monitoring the patient care through appropriate telecommunication technology.  I discussed the key portions of the service with the resident, including the mental status examination and developing the plan of care. I reviewed key portions of the history with the resident. I agree with the findings and plan as documented in this note.   Kun Myrick MD

## 2021-07-28 ENCOUNTER — TRANSFERRED RECORDS (OUTPATIENT)
Dept: HEALTH INFORMATION MANAGEMENT | Facility: CLINIC | Age: 41
End: 2021-07-28

## 2021-08-04 NOTE — PROGRESS NOTES
Video- Visit Details  Type of service:  video visit for medication management  Time of service:    Date:  08/05/2021    Video Start Time: 9:30AM       Video End Time:  10:00 AM    Reason for video visit:  Patient unable to travel due to Covid-19  Originating Site (patient location):  [facility]- [place]   Location- Patient's home  Distant Site (provider location):  [facility]  Mode of Communication:  Video Conference via AmWell  Consent:  Patient has given verbal consent for video visit?: Yes        [facility]  Psychiatry Clinic  MEDICAL PROGRESS NOTE     CARE TEAM:  PCP- [facility], Psychotherapist- None    Angelic Marrero is a 41 year old who prefers the name Saba and uses pronouns she, her, hers.      DIAGNOSIS     Attention deficit hyperactivity disorder, predominantly inattentive presentation  Generalized anxiety disorder  Major depressive disorder, recurrent, full remission     ASSESSMENT   Saba returns for a follow-up visit today after receiving a diagnosis of attention deficit hyperactivity disorder, predominantly inattentive presentation (see 07/13/21 progress note by Dr. Janna York). She is interested in pursuing both medication therapy and cognitive behavioral therapy for her ADHD. She was started on Adderall XR 10 mg daily, however she did not notice much benefit after the first 5 days of taking the medication. Agreed to increase to 15 mg daily to see if she feels more focused. Her goals include to complete projects in her home and keep the house more organized - which she started to do during the first 5 days, but on day 6 she had low mood and felt depressed. This is likely related to her usual low moods that occur during her menstrual cycle as she has felt this way before and it is not related to starting Adderall. She plans to continue measuring these goals to see if she has noticed improvement from the medication. Additionally, Saba  "was sent a symptom tracker sheet in her AVS so she can see if there are improvements while on Adderall. Saba was counseled regarding the risks of allergic reaction, heart attack, signs of high blood pressure, and signs of stroke. She was also counseled about disruptions in sleep, appetite, and irritability. We also provided education regarding the increased risk of serotonin syndrome with the combination of citalopram and Adderall.     MNPMP was checked today:  Indicates taking controlled medication as prescribed.     PLAN                                                                                                                1) Meds-  - Increase to Adderall XR 15 mg daily.  - Continue citalopram 30 mg daily.    2) Psychotherapy-  - Provided list of therapists who do CBT for ADHD in the community (via [employer]) by Dr. Carbone.  - May consider joining the waiting list for CBT through our learner clinic.  - Discussed using Psychology Today database to look for a therapist.  - Will follow up about therapist at next visit    3) Next due-  Labs- N/A  EKG- N/A  Rating scales- PHQ9 and NAYELY-7 at next in-person visit    4) Referrals-  None    5) Dispo- RTC 4 weeks       PERTINENT BACKGROUND                           [most recent eval 07/12/21]   Please see most recent DA for more pertinent background.    Psych pertinent item history includes mutiple psychotropic trials       SUBJECTIVE   - Saba felt the first 5 days of taking Adderall XR were \"life-changing\"; states she completed projects, organized her home  - Says afterwards she \"crashed\" - felt depressed, was laying on the cough and had no energy - states this has happened before usually around the time of her period  - Reports no SI, SIB or HI  - Denies any racing heart, palpitations, loss in appetite, headache, dizziness, or GI upset  - Has been feeling better the last 2 days  - Saba is interested in increasing her Adderall dose, would like to keep track of how she " is completing projects int he home and organization  - Agrees to check her blood pressure throughout the month with increased dose  - Patient will return to clinic in 1 month and earlier if she notices any side effects to medications or if her BP is consistently high  - Will send symptom tracker sheet so patient can track improvements noted with medication    RECENT PSYCH ROS:   Depression: Negative for depression, sadness, and suicidal ideation.  Elevated: None elicited  Psychosis: None elicited  Anxiety: Feels anxiety is well-controlled.  Trauma Related: None elicited.  Sleep: None elicited.    Adverse Effects: None reported.  Pertinent Negative Symptoms: No suicidal ideation, self-injurious behavior/urges or violent ideation  Recent Substance Use: None reported.     FAMILY and SOCIAL HISTORY                                 pt reported     Per 7/12/21 visit; not reviewed today.    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       Living situation- Lives in home with  and 4 children      Social/ Spiritual Support- , friends, sister, and mom       Feels Safe at Home- yes   Legal- None     Trauma History (self-report)- None  Early History/Education- Grew up in [place], [place]. Remembers being busy with friends in childhood. Hated that parents were . This was really hard for her early in divorce. Separation anxiety with mom. Otherwise had a happy childhood. Reported no known developmental milestone issues or difficulty with school that required special education plans. Highest level of education was completion of an HIRA.    PSYCH and SUBSTANCE USE Critical Summary Points since July 2021 7/12/21: Transfer of care diagnostic assessment.  7/22/21: Started Adderall XR 10 mg daily (recently diagnosed with  ADHD, predominantly inattentive presentation).  8/05/21: Increased to Adderall XR 15 mg daily     MEDICAL HISTORY and ALLERGY     ALLERGIES: No known drug allergies    Patient Active Problem List   Diagnosis     Allergic rhinitis     Other type of migraine     iamCERVICAL DISC DISPLACMNT     Nonallopathic lesion of cervical region     Plantar fasciitis, right        MEDICAL REVIEW OF SYSTEMS   Contraception- oral contraceptives (combined)    HEENT: Negative for headaches and blurry vision.  CARDIOVASCULAR: Negative for chest pain and palpitations.  GI: no N/V/D     MEDICATIONS     Current Outpatient Medications   Medication Sig Dispense Refill     amphetamine-dextroamphetamine (ADDERALL XR) 15 MG 24 hr capsule Take 1 capsule (15 mg) by mouth daily 30 capsule 0     CALCIUM PO        CHLOROQUINE PHOSPHATE 500 MG OR TABS one tablet weekly starting one week prior to departure and continuing for 4 weeks after return 6 0     Cholecalciferol (VITAMIN D3 PO)        citalopram (CELEXA) 20 MG tablet Take 1.5 tablets (30 mg) by mouth daily 135 tablet 0     Ferrous Sulfate (IRON PO)        levonorgest-eth estrad 91-Day (SEASONIQUE) 0.15-0.03 &0.01 MG tablet Take 1 tablet by mouth       Multiple Vitamin (MULTIVITAMIN ADULT PO)        Omega-3 1000 MG capsule Take 2 g by mouth       ZYRTEC 10 MG OR TABS 1 TABLET DAILY 30 prn      VITALS   There were no vitals taken for this visit.    MENTAL STATUS EXAM     Alertness: alert   Appearance: casually groomed  Behavior/Demeanor: cooperative, pleasant and calm, with good  eye contact   Speech: normal and regular rate and rhythm  Language: intact and no problems  Psychomotor: normal or unremarkable  Mood: description consistent with euthymia  Affect: full range; congruent to: mood- yes, content- yes  Thought Process/Associations: unremarkable  Thought Content:  Reports none;  Denies suicidal ideation and violent ideation  Perception:  Reports none;  Denies none  Insight: good  Judgment:  good  Cognition: does  appear grossly intact; formal cognitive testing was not done  Gait and Station: N/A (telehealth)     LABS and DATA     PHQ9 TODAY = not obtained today  PHQ 12/1/2020 2/17/2021 5/19/2021   PHQ-9 Total Score 1 6 5   Q9: Thoughts of better off dead/self-harm past 2 weeks Not at all Not at all Not at all       Recent Labs   Lab Test 07/19/18  1120   CR 0.73   GFRESTIMATED >60     Recent Labs   Lab Test 07/19/18  1120   AST 20   ALT 14   ALKPHOS 58        PSYCHOTROPIC DRUG INTERACTIONS   Per [employer]:   Concurrent use of ADDERALL XR and CITALOPRAM  may result in increased risk of serotonin syndrome.     MANAGEMENT:  Monitoring for adverse effects and patient is aware of risks     RISK STATEMENT for SAFETY     Angelic Marrero did not appear to be an imminent safety risk to self or others.    TREATMENT RISK STATEMENT: The risks, benefits, alternatives and potential adverse effects have been discussed and are understood by the pt. The pt understands the risks of using street drugs or alcohol. There are no medical contraindications, the pt agrees to treatment with the ability to do so. The pt knows to call the clinic for any problems or to access emergency care if needed.  Medical and substance use concerns are documented above.  Psychotropic drug interaction check was done, including changes made today.    PROVIDER: Brayden Valverde DO, MPH    Patient staffed with Dr. Sanford who will sign the note.  Supervisor is Dr. Greene.      TELEHEALTH ATTENDING ATTESTATION  Following the [employer] guidelines on telemedicine and direct supervision due to COVID-19, I was concurrently participating in and/or monitoring the patient care through appropriate telecommunication technology.  I discussed the key portions of the service with the resident, including the mental status examination and developing the plan of care. I reviewed key portions of the history with the resident. I agree with the findings and  plan as documented in this note.   Julito Sanford MD

## 2021-08-05 ENCOUNTER — VIRTUAL VISIT (OUTPATIENT)
Dept: PSYCHIATRY | Facility: CLINIC | Age: 41
End: 2021-08-05
Attending: PSYCHIATRY & NEUROLOGY
Payer: COMMERCIAL

## 2021-08-05 DIAGNOSIS — F90.0 ADHD (ATTENTION DEFICIT HYPERACTIVITY DISORDER), INATTENTIVE TYPE: ICD-10-CM

## 2021-08-05 PROCEDURE — 99214 OFFICE O/P EST MOD 30 MIN: CPT | Mod: GC | Performed by: STUDENT IN AN ORGANIZED HEALTH CARE EDUCATION/TRAINING PROGRAM

## 2021-08-05 RX ORDER — DEXTROAMPHETAMINE SACCHARATE, AMPHETAMINE ASPARTATE MONOHYDRATE, DEXTROAMPHETAMINE SULFATE AND AMPHETAMINE SULFATE 3.75; 3.75; 3.75; 3.75 MG/1; MG/1; MG/1; MG/1
15 CAPSULE, EXTENDED RELEASE ORAL DAILY
Qty: 30 CAPSULE | Refills: 0 | Status: SHIPPED | OUTPATIENT
Start: 2021-08-05 | End: 2021-09-07

## 2021-08-05 ASSESSMENT — PAIN SCALES - GENERAL: PAINLEVEL: NO PAIN (0)

## 2021-08-05 NOTE — PATIENT INSTRUCTIONS
Treatment Plan Today:     1) Medications - Increase to Adderall XR 15 mg daily    2) Please check your blood pressure while on this medication. If it is >160/90, or if you notice persistent headache, vision changes, or heart palpitations please get a medical evaluation and call the clinic for earlier follow-up    3) Follow-up appt with Dr Bishop 9/7/21 at 8:30AM    4) Crisis numbers are at the end of this document and clinic after hours number is [phone]    5) Below is a scale you can use to keep track of changes you notice while on Adderall. It can help to track how symptoms change over time to see if the medication is effective.    Adult ADHD Self-Report Scale (ASRS-v1.1) Symptom Checklist  Answer the following questions with the scale below:  Never - Rarely - Sometimes - Often - Very Often    1. How often do you have trouble wrapping up the final details of a project,  once the challenging parts have been done?    2. How often do you have difficulty getting things in order when you have to do  a task that requires organization?    3. How often do you have problems remembering appointments or obligations?    5. How often do you fidget or squirm with your hands or feet when you have  to sit down for a long time?    6. How often do you feel overly active and compelled to do things, like you  were driven by a motor?    7. How often do you make careless mistakes when you have to work on a boring or  difficult project?    8. How often do you have difficulty keeping your attention when you are doing boring  or repetitive work?    9. How often do you have difficulty concentrating on what people say to you,  even when they are speaking to you directly?    10. How often do you misplace or have difficulty finding things at home or at work?    11. How often are you distracted by activity or noise around you?    12. How often do you leave your seat in meetings or other situations in which  you are expected to remain  seated?    13. How often do you feel restless or fidgety?    14. How often do you have difficulty unwinding and relaxing when you have time  to yourself?    15. How often do you find yourself talking too much when you are in social situations?    16. When you re in a conversation, how often do you find yourself finishing  the sentences of the people you are talking to, before they can finish  them themselves?    17. How often do you have difficulty waiting your turn in situations when  turn taking is required?    18. How often do you interrupt others when they are busy?    ---------    **For crisis resources, please see the information at the end of this document**     Patient Education      Thank you for coming to the [facility] MENTAL HEALTH & ADDICTION [place] [facility].    Lab Testing:  If you had lab testing today and your results are reassuring or normal they will be mailed to you or sent through [employer] within 7 days. If the lab tests need quick action we will call you with the results. The phone number we will call with results is # [phone] (home) . If this is not the best number please call our clinic and change the number.    Medication Refills:  If you need any refills please call your pharmacy and they will contact us. Our fax number for refills is [phone]. Please allow three business for refill processing. If you need to  your refill at a new pharmacy, please contact the new pharmacy directly. The new pharmacy will help you get your medications transferred.     Scheduling:  If you have any concerns about today's visit or wish to schedule another appointment please call our office during normal business hours [phone] (8-5:00 M-F)    Contact Us:  Please call [phone] during business hours (8-5:00 M-F).  If after clinic hours, or on the weekend, please call  [phone].    Financial Assistance [phone]  [facility] Billing [phone]  Central Billing Office, [facility]:  [phone]  [facility] Billing [phone]  Medical Records [phone]  [facility] Patient Bill of Rights [email or web address]       MENTAL HEALTH CRISIS NUMBERS:  For a medical emergency please call  911 or go to the nearest ER.     [facility]:   [facility] -[phone]   Crisis Residence [facility]idence -[phone]   Walk-In Counseling Center [facility] -[phone]   COPE 24/7 [place] Mobile Team -[phone] (adults)/[phone] (child)  CHILD: [facility] needs assessment team - [phone]      [facility]:   [facility] - [phone]   Walk-in counseling [facility] - [phone]   Walk-in counseling [facility] - [phone]   Crisis Residence [facility] Residence - [phone]  Urgent Care Adult Mental [facility]-[phone] mobile unit/ 24/7 crisis line    National Crisis Numbers:   National Suicide Prevention Lifeline: 4-925-701-TALK (024-706-3034)  Poison Control Center - 2-435-444-8004  [employer]/resources for a list of additional resources (SOS)  Trans Lifeline a hotline for transgender people 4-365-722-6068  The Jean-Claude Project a hotline for LGBT youth 1-600.349.8755  Crisis Text Line: For any crisis 24/7   To: 339057  see www.crisistextline.org  - IF MAKING A CALL FEELS TOO HARD, send a text!         Again thank you for choosing [facility] MENTAL HEALTH & ADDICTION [facility] and please let us know how we can best partner with you to improve you and your family's health.    You may be receiving a survey regarding this appointment. We would love to have your feedback, both positive and negative. The survey is done by an external company, so your answers are anonymous.

## 2021-08-06 ENCOUNTER — TRANSFERRED RECORDS (OUTPATIENT)
Dept: HEALTH INFORMATION MANAGEMENT | Facility: CLINIC | Age: 41
End: 2021-08-06

## 2021-08-27 ENCOUNTER — TRANSFERRED RECORDS (OUTPATIENT)
Dept: HEALTH INFORMATION MANAGEMENT | Facility: CLINIC | Age: 41
End: 2021-08-27

## 2021-09-07 ENCOUNTER — VIRTUAL VISIT (OUTPATIENT)
Dept: PSYCHIATRY | Facility: CLINIC | Age: 41
End: 2021-09-07
Attending: PSYCHIATRY & NEUROLOGY
Payer: COMMERCIAL

## 2021-09-07 DIAGNOSIS — F90.0 ADHD (ATTENTION DEFICIT HYPERACTIVITY DISORDER), INATTENTIVE TYPE: ICD-10-CM

## 2021-09-07 PROCEDURE — 99214 OFFICE O/P EST MOD 30 MIN: CPT | Mod: GC | Performed by: STUDENT IN AN ORGANIZED HEALTH CARE EDUCATION/TRAINING PROGRAM

## 2021-09-07 RX ORDER — DEXTROAMPHETAMINE SACCHARATE, AMPHETAMINE ASPARTATE, DEXTROAMPHETAMINE SULFATE AND AMPHETAMINE SULFATE 1.25; 1.25; 1.25; 1.25 MG/1; MG/1; MG/1; MG/1
5 TABLET ORAL DAILY PRN
Qty: 30 TABLET | Refills: 0 | Status: SHIPPED | OUTPATIENT
Start: 2021-09-07 | End: 2021-10-04

## 2021-09-07 RX ORDER — DEXTROAMPHETAMINE SACCHARATE, AMPHETAMINE ASPARTATE MONOHYDRATE, DEXTROAMPHETAMINE SULFATE AND AMPHETAMINE SULFATE 3.75; 3.75; 3.75; 3.75 MG/1; MG/1; MG/1; MG/1
15 CAPSULE, EXTENDED RELEASE ORAL DAILY
Qty: 30 CAPSULE | Refills: 0 | Status: SHIPPED | OUTPATIENT
Start: 2021-09-07 | End: 2021-10-04

## 2021-09-07 ASSESSMENT — PAIN SCALES - GENERAL: PAINLEVEL: NO PAIN (0)

## 2021-09-07 NOTE — PROGRESS NOTES
"VIDEO VISIT  Angelic Marrero is a 41 year old patient who is being evaluated via a billable video visit.      The patient has been notified of following:   \"This video visit will be conducted via a call between you and your physician/provider. We have found that certain health care needs can be provided without the need for an in-person physical exam. This service lets us provide the care you need with a video conversation. If a prescription is necessary we can send it directly to your pharmacy. If lab work is needed we can place an order for that and you can then stop by our lab to have the test done at a later time. Insurers are generally covering virtual visits as they would in-office visits so billing should not be different than normal.  If for some reason you do get billed incorrectly, you should contact the billing office to correct it and that number is in the AVS .    Video Conference to be completed via:  [place]    Patient has given verbal consent for video visit?:  Yes    Patient would prefer that any video invitations be sent by: Send to e-mail at: [email or web address]      How would patient like to obtain AVS?:  [employer]ostang    AVS SmartPhrase [PsychAVS] has been placed in 'Patient Instructions':  Yes  "

## 2021-09-07 NOTE — PROGRESS NOTES
Video- Visit Details  Type of service:  video visit for medication management  Time of service:    Date:  09/07/2021    Video Start Time: 8:30AM      Video End Time:  9 AM    Reason for video visit:  Patient unable to travel due to Covid-19  Originating Site (patient location):  [facility] [place]   Location- Patient's home  Distant Site (provider location):  [facility]  Mode of Communication:  Video Conference via [employer]  Consent:  Patient has given verbal consent for video visit?: Yes        [facility]  Psychiatry Clinic  MEDICAL PROGRESS NOTE     CARE TEAM:  PCP- [facility], Psychotherapist- None    Angelic Marrero is a 41 year old who uses the name Saba and pronouns she, her, hers.      DIAGNOSIS     Attention deficit hyperactivity disorder, predominantly inattentive presentation  Generalized anxiety disorder  Major depressive disorder, recurrent, full remission     ASSESSMENT   Saba says the increased dose of Adderall has been helpful, she feel that she is more productive during the day. She notes that the medication wears off in the afternoon and often has difficulty completing tasks in the home. She also has had increased anxiety throughout the day, however believes this is related to her increased coffee consumption. Advised patient to decrease coffee use and see if this improves anxiety. Started Saba on Adderall 5 mg PRN in the afternoon - she believes she will need this 2-3 times a week. Counseled patient on side effects - including headache, dizziness, increased BP, racing heart, and palpitations with additional Adderall dose. Also advised patient that this could contribute to worsened anxiety, so she should reach out to the clinic if this is the case. Encouraged patient to connect with ADHD therapist - Dr. Carbone had provided list on initial encounter. Patient has been scheduled for an appointment in October with Dr. Carbone to see if this dose is helpful  "and patient could potentially transition to PCP at that point.     [place]MP was checked today:  Indicates taking controlled medication as prescribed.     PLAN                                                                                                                1) Meds-  - Increase to Adderall XR 15 mg daily  - Start Adderall 5 mg daily PRN for focus and concentration (use before 4pm)  - Continue citalopram 30 mg daily.    2) Psychotherapy- has not connected with ADHD therapist yet, will reach out prior to next appt    3) Next due-  Labs- N/A  EKG- N/A  Rating scales- PHQ9 and NAYELY-7 at next in-person visit    4) Referrals-  None    5) Dispo- RTC 4 weeks     PERTINENT BACKGROUND                           [most recent eval 07/12/21]   Please see most recent DA for more pertinent background.  Psych pertinent item history includes mutiple psychotropic trials       SUBJECTIVE   - Saba reports things are going well with the new dose of Adderall  - States that her anxiety has worsened but could be from drinking coffee  - Has about 1-2 cups of coffee a day, will try to decrease or switch to decaf to see if this improves anxiety  - Asks if she can take an as needed medication in the afternoon around 3pm - thinks she will need it 2/3 times a week  - Has noticed a decrease in appetite, but still eating meals and not snacking all day  - Has lost 5lbs but has also been exercising states it was 147 and now 142, is 5'5\" - continues to eat meals and snacks less, which patient says has been helpful because she is trying to lose weight  - States she has been getting up at 2-3AM and is up for about an hour, will play [employer] on phone - states it has happen 4 nights out of the last week - this has happened before the Adderall too. Advised patient that using her phone will wake her up even more at night - she acknowledged this and will try to improve sleep hygiene.   - Has not reached out to any therapist yet, will try to do " this before next visit  - Plans to reach out in 2 weeks with update on how afternoon Adderall is helping  - Patient will track symptoms (provided in AVS last visit) to see what improvements have been made since starting Adderall  -No SI/SIB or HI or other safety concerns today    RECENT PSYCH ROS:   Depression: Negative for depression, sadness, and suicidal ideation.  Elevated: None elicited  Psychosis: None elicited  Anxiety: Increased worries - believes related to increased caffeine intake  Trauma Related: None elicited.  Sleep: None elicited.    Adverse Effects: None reported.  Pertinent Negative Symptoms: No suicidal ideation, self-injurious behavior/urges or violent ideation  Recent Substance Use: None reported.     FAMILY and SOCIAL HISTORY                                 pt reported     Per 7/12/21 visit; not reviewed today.    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       Living situation- Lives in home with  and 4 children      Social/ Spiritual Support- , friends, sister, and mom       Feels Safe at Home- yes   Legal- None     Trauma History (self-report)- None  Early History/Education- Grew up in [place], [place]. Remembers being busy with friends in childhood. Hated that parents were . This was really hard for her early in divorce. Separation anxiety with mom. Otherwise had a happy childhood. Reported no known developmental milestone issues or difficulty with school that required special education plans. Highest level of education was completion of an HIRA.    PSYCH and SUBSTANCE USE Critical Summary Points since July 2021 7/12/21: Transfer of care diagnostic assessment.  7/22/21: Started Adderall XR 10 mg daily (recently diagnosed with ADHD, predominantly inattentive  presentation).  8/05/21: Increased to Adderall XR 15 mg daily  9/08/21 - Added Adderall 5 mg daily PRN and encouraged pt to connect with therapist     MEDICAL HISTORY and ALLERGY     ALLERGIES: No known drug allergies    Patient Active Problem List   Diagnosis     Allergic rhinitis     Other type of migraine     iamCERVICAL DISC DISPLACMNT     Nonallopathic lesion of cervical region     Plantar fasciitis, right      MEDICAL REVIEW OF SYSTEMS   Contraception- oral contraceptives (combined)    HEENT: Negative for headaches and blurry vision.  CARDIOVASCULAR: Negative for chest pain and palpitations.  GI: no N/V/D     MEDICATIONS     Current Outpatient Medications   Medication Sig Dispense Refill     amphetamine-dextroamphetamine (ADDERALL XR) 15 MG 24 hr capsule Take 1 capsule (15 mg) by mouth daily 30 capsule 0     CALCIUM PO        CHLOROQUINE PHOSPHATE 500 MG OR TABS one tablet weekly starting one week prior to departure and continuing for 4 weeks after return 6 0     Cholecalciferol (VITAMIN D3 PO)        citalopram (CELEXA) 20 MG tablet Take 1.5 tablets (30 mg) by mouth daily 135 tablet 0     Ferrous Sulfate (IRON PO)        levonorgest-eth estrad 91-Day (SEASONIQUE) 0.15-0.03 &0.01 MG tablet Take 1 tablet by mouth       Multiple Vitamin (MULTIVITAMIN ADULT PO)        Omega-3 1000 MG capsule Take 2 g by mouth       ZYRTEC 10 MG OR TABS 1 TABLET DAILY 30 prn      VITALS   There were no vitals taken for this visit.    MENTAL STATUS EXAM     Alertness: alert   Appearance: casually groomed  Behavior/Demeanor: cooperative, pleasant and calm, with good  eye contact   Speech: normal and regular rate and rhythm  Language: intact and no problems  Psychomotor: normal or unremarkable  Mood: description consistent with euthymia  Affect: full range; congruent to: mood- yes, content- yes  Thought Process/Associations: unremarkable  Thought Content:  Reports none;  Denies suicidal ideation and violent ideation  Perception:   Reports none;  Denies none  Insight: good  Judgment: good  Cognition: does  appear grossly intact; formal cognitive testing was not done  Gait and Station: N/A (telehealth)     LABS and DATA     PHQ9 TODAY = not obtained today  PHQ 12/1/2020 2/17/2021 5/19/2021   PHQ-9 Total Score 1 6 5   Q9: Thoughts of better off dead/self-harm past 2 weeks Not at all Not at all Not at all       Recent Labs   Lab Test 07/19/18  1120   CR 0.73   GFRESTIMATED >60     Recent Labs   Lab Test 07/19/18  1120   AST 20   ALT 14   ALKPHOS 58        PSYCHOTROPIC DRUG INTERACTIONS     ADDERALL XR and CITALOPRAM  may result in increased risk of serotonin syndrome.   MANAGEMENT:  Monitoring for adverse effects and patient is aware of risks     RISK STATEMENT for SAFETY     Angelic Marrero did not appear to be an imminent safety risk to self or others.    TREATMENT RISK STATEMENT: The risks, benefits, alternatives and potential adverse effects have been discussed and are understood by the pt. The pt understands the risks of using street drugs or alcohol. There are no medical contraindications, the pt agrees to treatment with the ability to do so. The pt knows to call the clinic for any problems or to access emergency care if needed.  Medical and substance use concerns are documented above.  Psychotropic drug interaction check was done, including changes made today.    PROVIDER: Brayden Valverde DO, MPH    Patient staffed with Dr. Turner who will sign the note.  Supervisor is Dr. Greene.

## 2021-09-07 NOTE — PATIENT INSTRUCTIONS
**For crisis resources, please see the information at the end of this document**     Patient Education      Thank you for coming to the [facility] MENTAL HEALTH & ADDICTION [place] CLIN[facility].    Lab Testing:  If you had lab testing today and your results are reassuring or normal they will be mailed to you or sent through [employer] within 7 days. If the lab tests need quick action we will call you with the results. The phone number we will call with results is # [phone] (home) . If this is not the best number please call our clinic and change the number.    Medication Refills:  If you need any refills please call your pharmacy and they will contact us. Our fax number for refills is [phone]. Please allow three business for refill processing. If you need to  your refill at a new pharmacy, please contact the new pharmacy directly. The new pharmacy will help you get your medications transferred.     Scheduling:  If you have any concerns about today's visit or wish to schedule another appointment please call our office during normal business hours [phone] (8-5:00 M-F)    Contact Us:  Please call [phone] during business hours (8-5:00 M-F).  If after clinic hours, or on the weekend, please call  [phone].    Financial Assistance [phone]  [facility] Billing [phone]  Central Billing Office, [facility]: [phone]  [facility] Billing [phone]  Medical Records [phone]  [facility] Patient Bill of Rights [email or web address]       MENTAL HEALTH CRISIS NUMBERS:  For a medical emergency please call  911 or go to the nearest ER.     [facility]:   [facility] -[phone]   Crisis Residence [facility] Residence -[phone]   Walk-In Counseling Center [facility] -[phone]   COPE 24/7 [place] Mobile Team -[phone] (adults)/[phone] (child)  CHILD: [facility] needs assessment  team - [phone]      [facility]:   [facility] - [phone]   Walk-in counseling [facility] - [phone]   Walk-in counseling [facility] - [phone]   Crisis Residence [facility] - [phone]  Urgent Care Adult Mental [facility]-[phone] mobile unit/ 24/7 crisis line    National Crisis Numbers:   National Suicide Prevention Lifeline: 2-891-882-TALK (461-559-4577)  Poison Control Center - 2-740-978-9407  [employer]/resources for a list of additional resources (SOS)  Trans Lifeline a hotline for transgender people 1-700.138.5781  The Jean-Claude Project a hotline for LGBT youth 8-728-609-0293  Crisis Text Line: For any crisis 24/7   To: 459774  see www.crisistextline.org  - IF MAKING A CALL FEELS TOO HARD, send a text!         Again thank you for choosing [facility] MENTAL HEALTH & ADDICTION [facility] and please let us know how we can best partner with you to improve you and your family's health.    You may be receiving a survey regarding this appointment. We would love to have your feedback, both positive and negative. The survey is done by an external company, so your answers are anonymous.

## 2021-09-19 ENCOUNTER — HEALTH MAINTENANCE LETTER (OUTPATIENT)
Age: 41
End: 2021-09-19

## 2021-09-21 ENCOUNTER — MYC MEDICAL ADVICE (OUTPATIENT)
Dept: PSYCHIATRY | Facility: CLINIC | Age: 41
End: 2021-09-21

## 2021-09-22 NOTE — TELEPHONE ENCOUNTER
From: Brayden Obregon MD   Sent: 9/22/2021  12:08 PM CDT   To: Laura Tobar RN   Subject: FW: Updates about my health                       Hi ,     Thanks for sending this my way. I will respond to Saba with recommendations.     Brayden Cesar

## 2021-09-28 ENCOUNTER — TRANSFERRED RECORDS (OUTPATIENT)
Dept: HEALTH INFORMATION MANAGEMENT | Facility: CLINIC | Age: 41
End: 2021-09-28

## 2021-10-02 ENCOUNTER — MYC MEDICAL ADVICE (OUTPATIENT)
Dept: PSYCHIATRY | Facility: CLINIC | Age: 41
End: 2021-10-02

## 2021-10-02 DIAGNOSIS — F90.0 ADHD (ATTENTION DEFICIT HYPERACTIVITY DISORDER), INATTENTIVE TYPE: ICD-10-CM

## 2021-10-04 RX ORDER — DEXTROAMPHETAMINE SACCHARATE, AMPHETAMINE ASPARTATE, DEXTROAMPHETAMINE SULFATE AND AMPHETAMINE SULFATE 1.25; 1.25; 1.25; 1.25 MG/1; MG/1; MG/1; MG/1
5 TABLET ORAL DAILY PRN
Qty: 30 TABLET | Refills: 0 | Status: SHIPPED | OUTPATIENT
Start: 2021-10-05 | End: 2021-10-12

## 2021-10-04 RX ORDER — DEXTROAMPHETAMINE SACCHARATE, AMPHETAMINE ASPARTATE MONOHYDRATE, DEXTROAMPHETAMINE SULFATE AND AMPHETAMINE SULFATE 3.75; 3.75; 3.75; 3.75 MG/1; MG/1; MG/1; MG/1
15 CAPSULE, EXTENDED RELEASE ORAL DAILY
Qty: 30 CAPSULE | Refills: 0 | Status: SHIPPED | OUTPATIENT
Start: 2021-10-05 | End: 2021-10-12

## 2021-10-04 NOTE — TELEPHONE ENCOUNTER
Last seen: 9/7/21  RTC: 4 weeks  Cancel: none  No-show: none  Next appt: 10/12/21     Medication requested: amphetamine-dextroamphetamine (ADDERALL XR) 15 MG 24 hr capsule  Directions: Take 1 capsule (15 mg) by mouth daily  Qty: 30  Last refilled: 9/7/21, 8/7/21, and 7/24/21 (XR 10 mg) per [place]      Medication requested: amphetamine-dextroamphetamine (ADDERALL) 5 MG tablet  Directions: Take 1 tablet (5 mg) by mouth daily as needed (focus and concentration)  Qty: 30  Last refilled: 9/7/21 per [place]      Medication refill approved per refill protocol    30 d/s of each medication pended and routed to resident and supervising provider

## 2021-10-11 NOTE — PROGRESS NOTES
VIDEO VISIT  Video- Visit Details  Type of service:  video visit for medication management  Time of service:    Date:  10/12/2021  Video Start Time:  8:35 AM        Video End Time:  8:45 AM  Reason for video visit:  Patient unable to travel due to Covid-19  Originating Site (patient location):  [facility]   Location- Patient's home  Distant Site (provider location):  Remote location  Mode of Communication:  Video Conference via AmWell  Consent:  Patient has given verbal consent for video visit?: Yes        [facility]  Psychiatry Clinic  MEDICAL PROGRESS NOTE     CARE TEAM:  PCP- [facility], Psychotherapist- None, however seeing ADHD   Angelic Marrero is a 41 year old who uses the name Saba and pronouns she, her, hers, herself.      DIAGNOSIS   Attention deficit hyperactivity disorder, predominantly inattentive presentation  Generalized anxiety disorder  Major depressive disorder, recurrent, full remission     ASSESSMENT   Saba presents via video visit for a follow-up visit. She has noticed some improvement with the Adderall XR, however feels that she still has cognitive difficulty with working on large tasks and thinking clearly. We discussed that part of the treatment for this is likely learning skills from her ADHD  (for how to work on large tasks) and that we could also try another stimulant to see if it was more effective at addressing her thinking. Saba was agreeable to trying another medication and I suggested Vyvanse given its long duration of action and the fact that she had previously noted a wearing off effect of her Adderall XR (see 9/7/21 note by SANDRITA Valverde DO). We discussed that similar to Adderall and citalopram, there is a risk of serotonin syndrome when Vyvanse and citalopram are combined. Saba expressed understanding.     MNPMP was checked today:  Indicates taking controlled medication as prescribed.     PLAN                                                                                                                 1) Meds-  - Discontinue Adderall XR 15 mg daily  - Discontinue Adderall 5 mg daily PRN for focus and concentration (use before 4 pm)  - Start lisdexamfetamine 30 mg daily.   - Continue citalopram 30 mg daily.    2) Psychotherapy- Continue working with ADHD .    3) Next due-  Labs- N/A  EKG- PRN  Rating scales- PHQ9 and NAYELY-7 at every visit    4) Referrals-  None    5) Dispo- Return to clinic on 11/4. Will reach out to clinic in approximately one week to let us know how Vyvanse is going.      PERTINENT BACKGROUND                           [most recent eval 07/12/21]   Please see most recent DA for more pertinent background.  Psych pertinent item history includes multiple psychotropic trials       SUBJECTIVE   Last visit on 9/7/21: started Adderall 5 mg daily PRN     Since the last visit:  - Adderall has been helping; feels happier and has more motivation, not overwhelmed when she is thinking about what to do.  - Feels productive  - Not helping with thinking more clearly  - Bigger projects that she cannot wrap her head around  - Started a session with ADHD , had good tips  - Has not noticed that the short acting does anything to help  - Does not take it (short acting )much because it does not have an effect  - Adderall in general is causing loose stools  - No HA, blurry vision, chest pain, heart racing  - Initially had loss of appetite, lost a couple lbs (which had been trying for), now hungry all of the time.    - Does not feel that anxiety is a problem right now.    RECENT PSYCH ROS:   Depression:  None elicited  Elevated:  None elicited  Psychosis:  None elicited  Anxiety:  None elicited  Trauma Related:  None elicited  Sleep: None elicited  ADHD: See HPI    Adverse Effects: Loose stools  Pertinent Negative Symptoms: No suicidal ideation or hallucinations  Recent Substance Use:     Not reviewed     FAMILY and SOCIAL  HISTORY                                 pt reported     Per 7/12/21 visit; not reviewed today.     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       Living situation- Lives in home with  and 4 children      Social/ Spiritual Support- , friends, sister, and mom     Feels Safe at Home- yes      PSYCH and SUBSTANCE USE Critical Summary Points since July 2021 7/12/21: Transfer of care diagnostic assessment.  7/22/21: Started Adderall XR 10 mg daily (recently diagnosed with ADHD, predominantly inattentive presentation).  8/05/21: Increased to Adderall XR 15 mg daily  9/08/21 - Added Adderall 5 mg daily PRN and encouraged pt to connect with therapist     MEDICAL HISTORY and ALLERGY     ALLERGIES: No known drug allergies    Patient Active Problem List   Diagnosis     Allergic rhinitis     Other type of migraine     iamCERVICAL DISC DISPLACMNT     Nonallopathic lesion of cervical region     Plantar fasciitis, right        MEDICAL REVIEW OF SYSTEMS   Contraception- oral contraceptives (combined)  HEENT: Negative for headache and blurry vision.  CARDIOVASCULAR: Negative for chest pain and subjective tachycardia.  GI: Positive for increased appetite and loose stools.     MEDICATIONS     Current Outpatient Medications   Medication Sig Dispense Refill     CALCIUM PO        CHLOROQUINE PHOSPHATE 500 MG OR TABS one tablet weekly starting one week prior to departure and continuing for 4 weeks after return 6 0     Cholecalciferol (VITAMIN D3 PO)        citalopram (CELEXA) 20 MG tablet Take 1.5 tablets (30 mg) by mouth daily 135 tablet 0     Ferrous Sulfate (IRON PO)        levonorgest-eth estrad 91-Day (SEASONIQUE) 0.15-0.03 &0.01 MG tablet Take 1 tablet by mouth       lisdexamfetamine (VYVANSE) 30 MG capsule Take 1 capsule  "(30 mg) by mouth every morning 30 capsule 0     Multiple Vitamin (MULTIVITAMIN ADULT PO)        Omega-3 1000 MG capsule Take 2 g by mouth       ZYRTEC 10 MG OR TABS 1 TABLET DAILY 30 prn      VITALS   There were no vitals taken for this visit.    MENTAL STATUS EXAM     Alertness: alert   Appearance: well groomed  Behavior/Demeanor: cooperative, pleasant and calm, with good  eye contact   Speech: normal and regular rate and rhythm  Language: intact and no obvious problem  Psychomotor: normal or unremarkable  Mood: \"happier\"  Affect: full range ; congruent to: mood- yes, content- yes  Thought Process/Associations: unremarkable  Thought Content:  Reports none;  Denies suicidal ideation  Perception:  Reports none;  Denies auditory hallucinations and visual hallucinations  Insight: excellent  Judgment: excellent  Cognition: does  appear grossly intact; formal cognitive testing was not done  [facility]     LABS and DATA     PHQ-9 TODAY = not obtained today  PHQ 12/1/2020 2/17/2021 5/19/2021   PHQ-9 Total Score 1 6 5   Q9: Thoughts of better off dead/self-harm past 2 weeks Not at all Not at all Not at all       Recent Labs   Lab Test 07/19/18  1120   CR 0.73   GFRESTIMATED >60     Recent Labs   Lab Test 07/19/18  1120   AST 20   ALT 14   ALKPHOS 58        PSYCHOTROPIC DRUG INTERACTIONS   Per Micromedex:  LISDEXAMFETAMINE and CITALOPRAM: may result in increased risk of serotonin syndrome.    MANAGEMENT:  Monitoring for adverse effects and patient is aware of risks     RISK STATEMENT for SAFETY     Angelic Marrero did not appear to be an imminent safety risk to self or others.    TREATMENT RISK STATEMENT: The risks, benefits, alternatives and potential adverse effects have been discussed and are understood by the pt. The pt understands the risks of using street drugs or alcohol. There are no medical contraindications, the pt agrees to treatment with the ability to do so. The pt knows to call the " clinic for any problems or to access emergency care if needed.  Medical and substance use concerns are documented above.  Psychotropic drug interaction check was done, including changes made today.     PROVIDER: Tara Carbone MD    Patient not staffed in clinic.  Note will be reviewed and signed by supervisor Dr. Sanford.      Attestation:  I did not see Angelic Marrero directly. I have reviewed the documentation and I agree with the resident's plan of care.   Julito Sanford MD

## 2021-10-12 ENCOUNTER — VIRTUAL VISIT (OUTPATIENT)
Dept: PSYCHIATRY | Facility: CLINIC | Age: 41
End: 2021-10-12
Attending: PSYCHIATRY & NEUROLOGY
Payer: COMMERCIAL

## 2021-10-12 DIAGNOSIS — F41.1 GENERALIZED ANXIETY DISORDER: ICD-10-CM

## 2021-10-12 DIAGNOSIS — F90.0 ADHD (ATTENTION DEFICIT HYPERACTIVITY DISORDER), INATTENTIVE TYPE: Primary | ICD-10-CM

## 2021-10-12 DIAGNOSIS — F33.42 RECURRENT MAJOR DEPRESSIVE DISORDER, IN FULL REMISSION (H): ICD-10-CM

## 2021-10-12 PROCEDURE — 99214 OFFICE O/P EST MOD 30 MIN: CPT | Mod: HN | Performed by: STUDENT IN AN ORGANIZED HEALTH CARE EDUCATION/TRAINING PROGRAM

## 2021-10-12 RX ORDER — LISDEXAMFETAMINE DIMESYLATE 30 MG/1
30 CAPSULE ORAL EVERY MORNING
Qty: 30 CAPSULE | Refills: 0 | Status: SHIPPED | OUTPATIENT
Start: 2021-10-12 | End: 2021-11-04

## 2021-10-12 RX ORDER — CITALOPRAM HYDROBROMIDE 20 MG/1
30 TABLET ORAL DAILY
Qty: 135 TABLET | Refills: 0 | Status: SHIPPED | OUTPATIENT
Start: 2021-10-12 | End: 2022-01-09

## 2021-10-12 ASSESSMENT — PAIN SCALES - GENERAL: PAINLEVEL: NO PAIN (0)

## 2021-10-12 NOTE — PROGRESS NOTES
"VIDEO VISIT  Angelic Marrero is a 41 year old patient that has consented to receive services via billable video visit.      The patient has been notified of following:   \"This video visit will be conducted via a call between you and your physician/provider. We have found that certain health care needs can be provided without the need for an in-person physical exam. This service lets us provide the care you need with a video conversation. If a prescription is necessary we can send it directly to your pharmacy. If lab work is needed we can place an order for that and you can then stop by our lab to have the test done at a later time. Insurers are generally covering virtual visits as they would in-office visits so billing should not be different than normal.  If for some reason you do get billed incorrectly, you should contact the billing office to correct it and that number is in the AVS .    Video Conference to be completed via:  [place]    If patient attempts to join the video via Mesosphere but is unable to, they would prefer that the provider send them a video invitation via:   Send to e-mail at: [email or web address]      How would patient like to obtain AVS?:  Plato Networkshart`    "

## 2021-10-12 NOTE — PATIENT INSTRUCTIONS
Please send me a [employer] message in one week to let me know how things are going with the Vyvanse.          **For crisis resources, please see the information at the end of this document**     Patient Education      Thank you for coming to the [facility] MENTAL HEALTH & ADDICTION [place] CLINIC.    Lab Testing:  If you had lab testing today and your results are reassuring or normal they will be mailed to you or sent through [employer] within 7 days. If the lab tests need quick action we will call you with the results. The phone number we will call with results is # [phone] (home) . If this is not the best number please call our clinic and change the number.    Medication Refills:  If you need any refills please call your pharmacy and they will contact us. Our fax number for refills is [phone]. Please allow three business for refill processing. If you need to  your refill at a new pharmacy, please contact the new pharmacy directly. The new pharmacy will help you get your medications transferred.     Scheduling:  If you have any concerns about today's visit or wish to schedule another appointment please call our office during normal business hours [phone] (8-5:00 M-F)    Contact Us:  Please call [phone] during business hours (8-5:00 M-F).  If after clinic hours, or on the weekend, please call  [phone].    Financial Assistance [phone]  [facility] Billing [phone]  Central Billing Office, [facility]: [phone]  [facility] Billing [phone]  Medical Records [phone]  [facility] Patient Bill of Rights [email or web address]       MENTAL HEALTH CRISIS NUMBERS:  For a medical emergency please call  911 or go to the nearest ER.     [facility]:   [facility] -[phone]   Crisis Residence [facility] -[phone]   Walk-In Counseling Center [facility] -[phone]   COPE 24/7  [facility] Mobile Team -[phone] (adults)/[phone] (child)  [facility]: [facility] - [phone]      [facility] - [phone]   Walk-in counseling [facility] - [phone]   Walk-in counseling [facility] - [phone]   Crisis Residence [facility] Residence - [phone]  Urgent Care Adult Mental Jgdxli-[phone] mobile unit/ 24/7 crisis line    National Crisis Numbers:   National Suicide Prevention Lifeline: 9-590-368-TALK (395-881-4305)  Poison Control Center - 1-183.301.8168  [place]/resources for a list of additional resources (SOS)  Trans Lifeline a hotline for transgender people 5-935-809-9003  The Jean-Claude Project a hotline for LGBT youth 1-298.292.8190  Crisis Text Line: For any crisis 24/7   To: 105370  see www.crisistextline.org  - IF MAKING A CALL FEELS TOO HARD, send a text!         Again thank you for choosing [facility] MENTAL HEALTH & ADDICTION [place] CLINIC and please let us know how we can best partner with you to improve you and your family's health.    You may be receiving a survey regarding this appointment. We would love to have your feedback, both positive and negative. The survey is done by an external company, so your answers are anonymous.

## 2021-11-04 ENCOUNTER — VIRTUAL VISIT (OUTPATIENT)
Dept: PSYCHIATRY | Facility: CLINIC | Age: 41
End: 2021-11-04
Attending: PSYCHIATRY & NEUROLOGY
Payer: COMMERCIAL

## 2021-11-04 DIAGNOSIS — F33.42 RECURRENT MAJOR DEPRESSIVE DISORDER, IN FULL REMISSION (H): ICD-10-CM

## 2021-11-04 DIAGNOSIS — F90.0 ADHD (ATTENTION DEFICIT HYPERACTIVITY DISORDER), INATTENTIVE TYPE: Primary | ICD-10-CM

## 2021-11-04 DIAGNOSIS — F41.1 GENERALIZED ANXIETY DISORDER: ICD-10-CM

## 2021-11-04 PROCEDURE — 99214 OFFICE O/P EST MOD 30 MIN: CPT | Mod: HN | Performed by: STUDENT IN AN ORGANIZED HEALTH CARE EDUCATION/TRAINING PROGRAM

## 2021-11-04 RX ORDER — LISDEXAMFETAMINE DIMESYLATE 30 MG/1
30 CAPSULE ORAL EVERY MORNING
Qty: 30 CAPSULE | Refills: 0 | Status: SHIPPED | OUTPATIENT
Start: 2021-11-11 | End: 2022-01-09

## 2021-11-04 ASSESSMENT — PATIENT HEALTH QUESTIONNAIRE - PHQ9
10. IF YOU CHECKED OFF ANY PROBLEMS, HOW DIFFICULT HAVE THESE PROBLEMS MADE IT FOR YOU TO DO YOUR WORK, TAKE CARE OF THINGS AT HOME, OR GET ALONG WITH OTHER PEOPLE: SOMEWHAT DIFFICULT
SUM OF ALL RESPONSES TO PHQ QUESTIONS 1-9: 1
SUM OF ALL RESPONSES TO PHQ QUESTIONS 1-9: 1

## 2021-11-04 ASSESSMENT — PAIN SCALES - GENERAL: PAINLEVEL: NO PAIN (0)

## 2021-11-04 NOTE — PROGRESS NOTES
"Answers for HPI/ROS submitted by the patient on 11/4/2021  If you checked off any problems, how difficult have these problems made it for you to do your work, take care of things at home, or get along with other people?: Somewhat difficult  PHQ9 TOTAL SCORE: 1    VIDEO VISIT  Angelic Marrero is a 41 year old patient that has consented to receive services via billable video visit.      The patient has been notified of following:   \"This video visit will be conducted via a call between you and your physician/provider. We have found that certain health care needs can be provided without the need for an in-person physical exam. This service lets us provide the care you need with a video conversation. If a prescription is necessary we can send it directly to your pharmacy. If lab work is needed we can place an order for that and you can then stop by our lab to have the test done at a later time. Insurers are generally covering virtual visits as they would in-office visits so billing should not be different than normal.  If for some reason you do get billed incorrectly, you should contact the billing office to correct it and that number is in the AVS .    Patient will join video visit via:  Online Prasad (Patient / guardian confirmed to join via Online Prasad)    If patient attempts to join the video via Online Prasad at appointment start time, but is unable to, they would prefer that the provider send them a video invitation via:   Send to preferred e-mail: [email or web address]      How would patient like to obtain AVS?:  MyChart    "

## 2021-11-04 NOTE — PATIENT INSTRUCTIONS
**For crisis resources, please see the information at the end of this document**     Patient Education      Thank you for coming to the [facility] MENTAL HEALTH & ADDICTION [place] CLINIC.    Lab Testing:  If you had lab testing today and your results are reassuring or normal they will be mailed to you or sent through [employer] within 7 days. If the lab tests need quick action we will call you with the results. The phone number we will call with results is # [phone] (home) . If this is not the best number please call our clinic and change the number.    Medication Refills:  If you need any refills please call your pharmacy and they will contact us. Our fax number for refills is [phone]. Please allow three business for refill processing. If you need to  your refill at a new pharmacy, please contact the new pharmacy directly. The new pharmacy will help you get your medications transferred.     Scheduling:  If you have any concerns about today's visit or wish to schedule another appointment please call our office during normal business hours [phone] (8-5:00 M-F)    Contact Us:  Please call [phone] during business hours (8-5:00 M-F).  If after clinic hours, or on the weekend, please call  [phone].    Financial Assistance [phone]  [facility] Billing [phone]  Central Billing Office, [facility]: [phone]  [facility] Billing [phone]  Medical Records [phone]  [facility] Patient Bill of Rights [email or web address]       MENTAL HEALTH CRISIS NUMBERS:  For a medical emergency please call  911 or go to the nearest ER.     [facility]:   [facility] -[phone]   Crisis Residence [facility] Residence -[phone]   Walk-In Counseling Center [facility] -[phone]   COPE 24/7 [place] Mobile Team -[phone] (adults)/[phone] (child)  CHILD: [facility] needs assessment  team - [phone]      [facility]:   [facility] - [phone]   Walk-in counseling [facility] - [phone]   Walk-in counseling [facility] - [phone]   Crisis Residence [facility] Residence - [phone]  Urgent Care Adult Mental Wrolrc-[phone] mobile unit/ 24/7 crisis line    National Crisis Numbers:   National Suicide Prevention Lifeline: 2-431-466-TALK (227-742-0253)  Poison Control Center - 9-874-535-4831  [employer]/resources for a list of additional resources (SOS)  Trans Lifeline a hotline for transgender people 1-844.643.6086  The Jean-Claude Project a hotline for LGBT youth 9-168-571-9479  Crisis Text Line: For any crisis 24/7   To: 925801  see www.crisistextline.org  - IF MAKING A CALL FEELS TOO HARD, send a text!         Again thank you for choosing [facility] MENTAL HEALTH & ADDICTION [facility] and please let us know how we can best partner with you to improve you and your family's health.    You may be receiving a survey regarding this appointment. We would love to have your feedback, both positive and negative. The survey is done by an external company, so your answers are anonymous.

## 2021-11-04 NOTE — PROGRESS NOTES
VIDEO VISIT  Video- Visit Details  Type of service:  video visit for medication management  Time of service:    Date:  11/04/2021  Video Start Time:  8:40 AM     Video End Time:  9:00 AM  Reason for video visit:  Patient unable to travel due to Covid-19  Originating Site (patient location):  [facility]   Location- Patient's home  Distant Site (provider location):  Remote location  Mode of Communication:  Video Conference via [employer]  Consent:  Patient has given verbal consent for video visit?: Yes        [facility]  Psychiatry Clinic  MEDICAL PROGRESS NOTE     CARE TEAM:  PCP- [facility], Psychotherapist- None, however seeing ADHD   Angelic Marrero is a 41 year old who uses the name Saba and pronouns she, her, hers, herself.      DIAGNOSIS   Attention deficit hyperactivity disorder, predominantly inattentive presentation  Generalized anxiety disorder  Major depressive disorder, recurrent, full remission     ASSESSMENT   Saba presents via video visit for a follow-up visit.  She continues to report symptoms of ADHD.  We discussed increasing her dose of lisdexamfetamine however Saba did report an episode of chest pain that woke her from sleep within the week leading up to this appointment.  She does not have a cardiac history.  It was not apparent from her report that this was an episode of indigestion, however it is possible that that was the etiology of her chest pain.  Given the cardiac risks associated with Vyvanse, we recommended that she obtain a cardiac screening with EKG at her primary care physician's office prior to us increasing her dose of Vyvanse.  We discussed that she could either discontinue Vyvanse at this time or continue to take Vyvanse 30 mg daily with the understanding that if she experiences any cardiac symptoms, such as repeat chest pain, she should seek immediate medical attention.  Saba indicated that she plans to continue to take her  "Vyvanse and agreed to the plan to seek immediate medical attention if needed.  We will plan to reevaluate her symptoms of ADHD and consider increasing her dose of Vyvanse after she meets with her primary care physician as long as there are no cardiac concerns.    [place] was [place] today:  Indicates taking controlled medication as prescribed.     PLAN                                                                                                                1) Meds-  - Continue lisdexamfetamine 30 mg daily.   - Continue citalopram 30 mg daily.    2) Psychotherapy- Continue working with ADHD .    3) Next due-  Labs- N/A  EKG- PRN  Rating scales- PHQ9 and NAYELY-7 at every visit    4) Referrals-  Recommended that Saba follow-up with her PCP for a screening cardiac evaluation to include EKG.    5) Dispo- Saba will schedule follow-up appointment after she has seen her primary care physician.      PERTINENT BACKGROUND                           [most recent eval 07/12/21]   Please see most recent DA for more pertinent background.  Psych pertinent item history includes multiple psychotropic trials       SUBJECTIVE   Last visit on 9/7/21: discontinued Adderall XR 15 mg daily and Adderall 5 mg daily PRN. Started lisdexamfetamine 30 mg daily.     Since the last visit:  - \"Pretty good.\"  - Vyanse has felt the same as Adderall.   - Still does not feel like she can focus and organize thoughts.    - She likes the way that the Vyanse makes her feel motivated and that she can do stuff for a few hours.  - Otherwise feels like \" a hot mess\"  Acknowledges might have too much going  - Has been meeting with the ADHD , helpful, mostly helping break down big projects and things that she is really stuck on. Never gets to projects because overwhelmed by daily stuff.  - Learning good things from  but has not put into practice.  - Not losing things.   - Is forgetful and easily distracted.    Side effects   - One episode of " sharp, chest pain (with every breath), possibly indigestion, has been eating a lot of Halloween candy, 2 nights ago, lasted for 20 seconds (woke her up, she breathed through it) relieved by standing (tightness and pain), breathing was fine, no abnormal taste in mouth.   - No cardiac history.  - Has had indigestion before (not frequent).  - No exercise or anything that could have pulled a muscle.   - No headaches, palpitations, decreased appetite, insomnia.  - No fainting, irregular or fast heart rate  - Will make appointment with PCP for cardiac evaluation/screening with EKG  - Discussed that she could either stop taking Vyvanse or continue to take it, with the understanding that she should seek immediate medical attention should she experience any of these symptoms  - Expressed understanding - will continue to take Vyvanse  - Will have follow-up appointment following appt with PCP    Mood  - Mood is fine, no complaints  - Has been a stressful couple of weeks (however does not feel hopeless or depressed)  - No SI    RECENT PSYCH ROS:   Depression:  Negative for depressed mood, hopelessness, and suicidal ideation.   Elevated:  None elicited  Psychosis:  None elicited  Anxiety:  None elicited  Trauma Related:  None elicited  Sleep: None elicited  ADHD: Positive for forgetfulness, easily distracted, difficulty focusing, and difficulty organizing thoughts. Negative for losing things.     Adverse Effects: One episode of chest pain, ongoing loose stools  Pertinent Negative Symptoms: No suicidal ideation.  Recent Substance Use:     No increase in alcohol or caffeine consumption.      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       Living  situation- Lives in home with  and 4 children      Social/ Spiritual Support- , friends, sister, and mom     Feels Safe at Home- yes      PSYCH and SUBSTANCE USE Critical Summary Points since July 2021 7/12/21: Transfer of care diagnostic assessment.  7/22/21: Started Adderall XR 10 mg daily (recently diagnosed with ADHD, predominantly inattentive presentation).  8/05/21: Increased to Adderall XR 15 mg daily  9/08/21: Added Adderall 5 mg daily PRN and encouraged pt to connect with therapist  10/12/21: Discontinued Adderall XR 15 mg daily and Adderall 5 mg daily PRN. Started lisdexamfetamine 30 mg daily.     MEDICAL HISTORY and ALLERGY     ALLERGIES: No known drug allergies    Patient Active Problem List   Diagnosis     Allergic rhinitis     Other type of migraine     iamCERVICAL DISC DISPLACMNT     Nonallopathic lesion of cervical region     Plantar fasciitis, right        MEDICAL REVIEW OF SYSTEMS   Contraception- oral contraceptives (combined)    GEN: Negative for insomnia.   HEENT: Negative for headache, xerostomia, and blurry vision.  CARDIOVASCULAR: Positive for one episode of chest pain. Negative for palpitations, irregular heart rate, and fast heart rate.  GI: Negative for decreased appetite. Positive for loose stools.   NEURO: Negative for tremors.      MEDICATIONS     Current Outpatient Medications   Medication Sig Dispense Refill     CALCIUM PO        CHLOROQUINE PHOSPHATE 500 MG OR TABS one tablet weekly starting one week prior to departure and continuing for 4 weeks after return 6 0     Cholecalciferol (VITAMIN D3 PO)        citalopram (CELEXA) 20 MG tablet Take 1.5 tablets (30 mg) by mouth daily 135 tablet 0     Ferrous Sulfate (IRON PO)        levonorgest-eth estrad 91-Day (SEASONIQUE) 0.15-0.03 &0.01 MG tablet Take 1 tablet by mouth       lisdexamfetamine (VYVANSE) 30 MG capsule Take 1 capsule (30 mg) by mouth every morning 30 capsule 0     Multiple Vitamin (MULTIVITAMIN ADULT  "PO)        Omega-3 1000 MG capsule Take 2 g by mouth       ZYRTEC 10 MG OR TABS 1 TABLET DAILY 30 prn      VITALS   There were no vitals taken for this visit.    MENTAL STATUS EXAM     Alertness: alert    Appearance: well groomed  Behavior/Demeanor: cooperative, pleasant and calm, with good  eye contact   Speech: normal and regular rate and rhythm  Language: intact and no obvious problem  Psychomotor: normal or unremarkable  Mood: \"fine\"  Affect: appropriate ; congruent to: mood- yes, content- yes  Thought Process/Associations: unremarkable  Thought Content:  Reports none;  Denies suicidal ideation  Perception:  Reports none;  Denies auditory hallucinations and visual hallucinations  Insight: excellent  Judgment: excellent  Cognition: does  appear grossly intact; formal cognitive testing was not done  Gait and Station: N/A (telehealth)     LABS and DATA     PHQ-9 TODAY = 1  PHQ 2/17/2021 5/19/2021 11/4/2021   PHQ-9 Total Score 6 5 1   Q9: Thoughts of better off dead/self-harm past 2 weeks Not at all Not at all Not at all       NAYELY-7 SCORE 12/1/2020 2/17/2021 5/19/2021   Total Score - - 4 (minimal anxiety)   Total Score 3 4 4       Recent Labs   Lab Test 07/19/18  1120   CR 0.73   GFRESTIMATED >60     Recent Labs   Lab Test 07/19/18  1120   AST 20   ALT 14   ALKPHOS 58        PSYCHOTROPIC DRUG INTERACTIONS   Per [employer]:  LISDEXAMFETAMINE and CITALOPRAM: may result in increased risk of serotonin syndrome.    MANAGEMENT:  Monitoring for adverse effects and patient is aware of risks     RISK STATEMENT for SAFETY     Angelic Marrero did not appear to be an imminent safety risk to self or others.    TREATMENT RISK STATEMENT: The risks, benefits, alternatives and potential adverse effects have been discussed and are understood by the pt. The pt understands the risks of using street drugs or alcohol. There are no medical contraindications, the pt agrees to treatment with the ability to do so. The pt knows to call the " clinic for any problems or to access emergency care if needed.  Medical and substance use concerns are documented above.  Psychotropic drug interaction check was done, including changes made today.     PROVIDER: Tara Carbone MD    Patient not staffed in clinic.  Note will be reviewed and signed by supervisor Dr. Sanford.      Attestation:  I did not see Angelic Marrero directly. I have reviewed the documentation and I agree with the resident's plan of care.   Julito Sanford MD    Note: This patient was not staffed in clinic, however I did discuss the case with Dr. Sanford later that day in order to ensure that we had an appropriate plan for Saba.

## 2021-11-05 ASSESSMENT — PATIENT HEALTH QUESTIONNAIRE - PHQ9: SUM OF ALL RESPONSES TO PHQ QUESTIONS 1-9: 1

## 2021-12-02 ENCOUNTER — THERAPY VISIT (OUTPATIENT)
Dept: PHYSICAL THERAPY | Facility: CLINIC | Age: 41
End: 2021-12-02
Payer: COMMERCIAL

## 2021-12-02 DIAGNOSIS — M25.552 HIP PAIN, LEFT: Primary | ICD-10-CM

## 2021-12-02 PROCEDURE — 97530 THERAPEUTIC ACTIVITIES: CPT | Mod: GP | Performed by: PHYSICAL THERAPIST

## 2021-12-02 PROCEDURE — 97110 THERAPEUTIC EXERCISES: CPT | Mod: GP | Performed by: PHYSICAL THERAPIST

## 2021-12-02 PROCEDURE — 97161 PT EVAL LOW COMPLEX 20 MIN: CPT | Mod: GP | Performed by: PHYSICAL THERAPIST

## 2021-12-02 ASSESSMENT — ACTIVITIES OF DAILY LIVING (ADL)
TWISTING/PIVOTING_ON_INVOLVED_LEG: NO DIFFICULTY AT ALL
LIGHT_TO_MODERATE_WORK: NO DIFFICULTY AT ALL
WALKING_INITIALLY: NO DIFFICULTY AT ALL
GETTING_INTO_AND_OUT_OF_A_BATHTUB: NO DIFFICULTY AT ALL
GOING_UP_1_FLIGHT_OF_STAIRS: NO DIFFICULTY AT ALL
DEEP_SQUATTING: MODERATE DIFFICULTY
GETTING_INTO_AND_OUT_OF_AN_AVERAGE_CAR: NO DIFFICULTY AT ALL
RECREATIONAL_ACTIVITIES: SLIGHT DIFFICULTY
HOS_ADL_HIGHEST_POTENTIAL_SCORE: 68
GOING_DOWN_1_FLIGHT_OF_STAIRS: NO DIFFICULTY AT ALL
STEPPING_UP_AND_DOWN_CURBS: NO DIFFICULTY AT ALL
ROLLING_OVER_IN_BED: NO DIFFICULTY AT ALL
SITTING_FOR_15_MINUTES: NO DIFFICULTY AT ALL
HEAVY_WORK: SLIGHT DIFFICULTY
WALKING_UP_STEEP_HILLS: NO DIFFICULTY AT ALL
STANDING_FOR_15_MINUTES: NO DIFFICULTY AT ALL
WALKING_APPROXIMATELY_10_MINUTES: NO DIFFICULTY AT ALL
HOS_ADL_ITEM_SCORE_TOTAL: 64
WALKING_DOWN_STEEP_HILLS: NO DIFFICULTY AT ALL
PUTTING_ON_SOCKS_AND_SHOES: NO DIFFICULTY AT ALL
HOS_ADL_COUNT: 17
WALKING_15_MINUTES_OR_GREATER: NO DIFFICULTY AT ALL
HOS_ADL_SCORE(%): 94.12

## 2021-12-02 NOTE — PROGRESS NOTES
Physical Therapy Initial Evaluation  Subjective:  The history is provided by the patient. No  was used.   Therapist Generated HPI Evaluation  Problem details: Pt present to PT with Left hip pain starting 9/21. She states certain positions bring it on,  No history of injury.    Pt notes pain with deep squats and when sitting on the floor with legs in flex/abductiion.             .         Type of problem:  Left hip.    This is a new condition.  Condition occurred with:  Insidious onset.  Where condition occurred: for unknown reasons.  Patient reports pain:  Groin and joint.  Pain is described as sharp and is intermittent.  Pain radiates to:  No radiation. Pain is worse during the day.  Since onset symptoms are unchanged.  Symptoms are exacerbated by bending/squatting and certain positions        Restrictions due to condition include:  Working in normal job without restrictions.  Barriers include:  None as reported by patient.                        Objective:  System                                           Hip Evaluation  HIP AROM:  AROM:   Left Hip:     Normal    Right Hip:    Flexion: Left: wnl, pinching at end range   Right:     Extension: Left: wl   Right:   Abduction: Left: Wnl    Right:       Internal Rotation: Left: wnl   Right:  External Rotation: Left: wnl, end range discomfort   Right:  Knee Flexion: Left: wnl    Right:  Knee Extension: Left: wnl   Right:  Hip PROM:  Hip PROM:  Left Hip:   Normal  Right Hip:                             Hip Special Testing:    Left hip positive for the following special tests:  Vee  Left hip negative for the following special tests:  SLR or Barbra's  Right hip negative for the following special tests:  Vee    Hip Palpation:    Left hip tenderness present at:   Greater Trachanter; IT Band; hip flexors and Piriformis    Functional Testing:          Quad:    Single leg squat:   Left:   Mild loss of control and excessive anterior knee excursion  Right:       Bilateral leg squat:  Normal control                  General     ROS    Assessment/Plan:    Patient is a 41 year old female with left side hip complaints.    Patient has the following significant findings with corresponding treatment plan.                Diagnosis 1:  Left hip pain  Pain -  hot/cold therapy, manual therapy, self management and home program  Decreased ROM/flexibility - manual therapy and therapeutic exercise  Decreased strength - therapeutic exercise and therapeutic activities    Therapy Evaluation Codes:   1) History comprised of:   Personal factors that impact the plan of care:      None.    Comorbidity factors that impact the plan of care are:      None.     Medications impacting care: Anti-inflammatory.  2) Examination of Body Systems comprised of:   Body structures and functions that impact the plan of care:      Hip.   Activity limitations that impact the plan of care are:      Sports and Squatting/kneeling.  3) Clinical presentation characteristics are:   Stable/Uncomplicated.  4) Decision-Making    Low complexity using standardized patient assessment instrument and/or measureable assessment of functional outcome.  Cumulative Therapy Evaluation is: Low complexity.    Previous and current functional limitations:  (See Goal Flow Sheet for this information)    Short term and Long term goals: (See Goal Flow Sheet for this information)     Communication ability:  Patient appears to be able to clearly communicate and understand verbal and written communication and follow directions correctly.  Treatment Explanation - The following has been discussed with the patient:   RX ordered/plan of care  Anticipated outcomes  Possible risks and side effects  This patient would benefit from PT intervention to resume normal activities.   Rehab potential is good.    Frequency:  1 X week, once daily  Duration:  for 6 weeks  Discharge Plan:  Achieve all LTG.  Independent in home treatment program.  Reach  maximal therapeutic benefit.    Please refer to the daily flowsheet for treatment today, total treatment time and time spent performing 1:1 timed codes.

## 2021-12-09 ENCOUNTER — OFFICE VISIT (OUTPATIENT)
Dept: FAMILY MEDICINE | Facility: CLINIC | Age: 41
End: 2021-12-09
Payer: COMMERCIAL

## 2021-12-09 VITALS
HEART RATE: 59 BPM | BODY MASS INDEX: 25.27 KG/M2 | DIASTOLIC BLOOD PRESSURE: 65 MMHG | HEIGHT: 64 IN | WEIGHT: 148 LBS | TEMPERATURE: 98.1 F | SYSTOLIC BLOOD PRESSURE: 109 MMHG

## 2021-12-09 DIAGNOSIS — R07.89 ATYPICAL CHEST PAIN: ICD-10-CM

## 2021-12-09 DIAGNOSIS — N39.3 FEMALE STRESS INCONTINENCE: ICD-10-CM

## 2021-12-09 DIAGNOSIS — F41.1 GAD (GENERALIZED ANXIETY DISORDER): ICD-10-CM

## 2021-12-09 DIAGNOSIS — G43.829 MENSTRUAL MIGRAINE WITHOUT STATUS MIGRAINOSUS, NOT INTRACTABLE: ICD-10-CM

## 2021-12-09 DIAGNOSIS — F33.42 RECURRENT MAJOR DEPRESSIVE DISORDER, IN FULL REMISSION (H): ICD-10-CM

## 2021-12-09 DIAGNOSIS — Z01.818 PREOP GENERAL PHYSICAL EXAM: Primary | ICD-10-CM

## 2021-12-09 DIAGNOSIS — Z13.220 LIPID SCREENING: ICD-10-CM

## 2021-12-09 DIAGNOSIS — F90.0 ATTENTION DEFICIT HYPERACTIVITY DISORDER (ADHD), PREDOMINANTLY INATTENTIVE TYPE: ICD-10-CM

## 2021-12-09 LAB
ANION GAP SERPL CALCULATED.3IONS-SCNC: 8 MMOL/L (ref 5–18)
BUN SERPL-MCNC: 14 MG/DL (ref 8–22)
CALCIUM SERPL-MCNC: 9.4 MG/DL (ref 8.5–10.5)
CHLORIDE BLD-SCNC: 106 MMOL/L (ref 98–107)
CHOLEST SERPL-MCNC: 153 MG/DL
CO2 SERPL-SCNC: 25 MMOL/L (ref 22–31)
CREAT SERPL-MCNC: 0.81 MG/DL (ref 0.6–1.1)
ERYTHROCYTE [DISTWIDTH] IN BLOOD BY AUTOMATED COUNT: 12.7 % (ref 10–15)
FASTING STATUS PATIENT QL REPORTED: NO
GFR SERPL CREATININE-BSD FRML MDRD: 90 ML/MIN/1.73M2
GLUCOSE BLD-MCNC: 85 MG/DL (ref 70–125)
HCT VFR BLD AUTO: 40.4 % (ref 35–47)
HDLC SERPL-MCNC: 59 MG/DL
HGB BLD-MCNC: 12.8 G/DL (ref 11.7–15.7)
LDLC SERPL CALC-MCNC: 79 MG/DL
MCH RBC QN AUTO: 28.9 PG (ref 26.5–33)
MCHC RBC AUTO-ENTMCNC: 31.7 G/DL (ref 31.5–36.5)
MCV RBC AUTO: 91 FL (ref 78–100)
PLATELET # BLD AUTO: 238 10E3/UL (ref 150–450)
POTASSIUM BLD-SCNC: 4.5 MMOL/L (ref 3.5–5)
RBC # BLD AUTO: 4.43 10E6/UL (ref 3.8–5.2)
SODIUM SERPL-SCNC: 139 MMOL/L (ref 136–145)
TRIGL SERPL-MCNC: 73 MG/DL
WBC # BLD AUTO: 4.8 10E3/UL (ref 4–11)

## 2021-12-09 PROCEDURE — 93010 ELECTROCARDIOGRAM REPORT: CPT | Performed by: GENERAL ACUTE CARE HOSPITAL

## 2021-12-09 PROCEDURE — 99214 OFFICE O/P EST MOD 30 MIN: CPT | Performed by: FAMILY MEDICINE

## 2021-12-09 PROCEDURE — 80048 BASIC METABOLIC PNL TOTAL CA: CPT | Performed by: FAMILY MEDICINE

## 2021-12-09 PROCEDURE — 36415 COLL VENOUS BLD VENIPUNCTURE: CPT | Performed by: FAMILY MEDICINE

## 2021-12-09 PROCEDURE — 80061 LIPID PANEL: CPT | Performed by: FAMILY MEDICINE

## 2021-12-09 PROCEDURE — 85027 COMPLETE CBC AUTOMATED: CPT | Performed by: FAMILY MEDICINE

## 2021-12-09 PROCEDURE — 93005 ELECTROCARDIOGRAM TRACING: CPT | Performed by: FAMILY MEDICINE

## 2021-12-09 ASSESSMENT — MIFFLIN-ST. JEOR: SCORE: 1325.29

## 2021-12-09 NOTE — PROGRESS NOTES
[facility]  [address] [facility] [address]  Phone: [phone]  Fax: [phone]  Primary Provider: Jeovany Barrientos  Pre-op Performing Provider: JULIENNE HANDY    PREOPERATIVE EVALUATION:  Today's date: 12/9/2021    Angelic Marrero is a 41 year old female who presents for a preoperative evaluation.    Surgical Information:  Surgery/Procedure: Bladder Sling  Surgery Location: [facility] [place] with Dr. Espinosa  Surgery Date: 12/15  Time of Surgery: tbd  Where patient plans to recover: At home with family  Fax number for surgical facility: [phone]    Type of Anesthesia Anticipated: per anesthesia    Assessment & Plan     Preop general physical exam  Female stress incontinence  Patient undergoing bladder sling procedure due to history of stress incontinence.    The proposed surgical procedure is considered INTERMEDIATE risk.    RECOMMENDATION:  APPROVAL GIVEN to proceed with proposed procedure, without further diagnostic evaluation.    - CBC with platelets  - Basic metabolic panel  (Ca, Cl, CO2, Creat, Gluc, K, Na, BUN)    Atypical chest pain  Patient reports an isolated episode of chest pain within the past week that self resolved after 15 seconds. She has no personal or family history of cardiac disease and is able to participate in high intensity exercise without cardiopulmonary symptoms.  EKG performed today and normal.     Attention deficit hyperactivity disorder (ADHD), primarily inattentive type  Generalized anxiety disorder  Major depression of disorder, recurrent, full remission  Follows with psychiatry and is currently on Vyvanse and citalopram.  She will take citalopram the morning of surgery and can hold Vyvanse.      Menstrual migraine without status migrainosus, not intractable  Improved with OCP which she takes in the evenings.    Lipid screening  - Lipid Profile (Chol, Trig, HDL, LDL calc)      Subjective     HPI related to upcoming  procedure:   For prior vaginal deliveries.  Has been dealing with stress urinary incontinence with coughing, sneezing, and working out.  She will be undergoing surgery for a bladder sling.    One episode of chest pain that occurred in the middle the night last week.  It lasted approximately 15 seconds and then self resolved.  She questions indigestion.  Patient denies any cardiac history or family history of early CAD.  She is able to exercise without chest pain or shortness of breath.    Preop Questions 12/4/2021   1. Have you ever had a heart attack or stroke? No   2. Have you ever had surgery on your heart or blood vessels, such as a stent placement, a coronary artery bypass, or surgery on an artery in your head, neck, heart, or legs? No   3. Do you have chest pain with activity? No   4. Do you have a history of  heart failure? No   5. Do you currently have a cold, bronchitis or symptoms of other infection? No   6. Do you have a cough, shortness of breath, or wheezing? No   7. Do you or anyone in your family have previous history of blood clots? No   8. Do you or does anyone in your family have a serious bleeding problem such as prolonged bleeding following surgeries or cuts? No   9. Have you ever had problems with anemia or been told to take iron pills? YES -reports low iron in the past, currently taking iron supplement which helps with fatigue   10. Have you had any abnormal blood loss such as black, tarry or bloody stools, or abnormal vaginal bleeding? No   11. Have you ever had a blood transfusion? No   12. Are you willing to have a blood transfusion if it is medically needed before, during, or after your surgery? Yes   13. Have you or any of your relatives ever had problems with anesthesia? No   14. Do you have sleep apnea, excessive snoring or daytime drowsiness? No   15. Do you have any artifical heart valves or other implanted medical devices like a pacemaker, defibrillator, or continuous glucose monitor?  No   16. Do you have artificial joints? No   17. Are you allergic to latex? No   18. Is there any chance that you may be pregnant? No       Health Care Directive:  Patient does not have a Health Care Directive or Living Will: Discussed advance care planning with patient; information given to patient to review.    Preoperative Review of :   reviewed - no record of controlled substances prescribed.    Review of Systems  CONSTITUTIONAL: NEGATIVE for fever, chills, change in weight  INTEGUMENTARY/SKIN: NEGATIVE for worrisome rashes, moles or lesions  EYES: NEGATIVE for vision changes or irritation  ENT/MOUTH: NEGATIVE for ear, mouth and throat problems  RESP: NEGATIVE for significant cough or SOB  CV: NEGATIVE for palpitations or peripheral edema, positive for episode of chest pain x1  GI: NEGATIVE for nausea, abdominal pain, heartburn, or change in bowel habits  : Positive for urinary incontinence  MUSCULOSKELETAL: NEGATIVE for significant arthralgias or myalgia  NEURO: NEGATIVE for weakness, dizziness or paresthesias  ENDOCRINE: NEGATIVE for temperature intolerance, skin/hair changes  HEME: NEGATIVE for bleeding problems  PSYCHIATRIC: NEGATIVE for changes in mood or affect    Patient Active Problem List    Diagnosis Date Noted     Plantar fasciitis, right 08/28/2018     Priority: Medium     iamCERVICAL DISC DISPLACMNT 03/07/2007     Priority: Medium     Nonallopathic lesion of cervical region 03/07/2007     Priority: Medium     Problem list name updated by automated process. Provider to review       Other type of migraine      Priority: Medium     Diagnosis updated by automated process. Provider to review and confirm.       Allergic rhinitis 08/31/2004     Priority: Medium     Problem list name updated by automated process. Provider to review        Past Medical History:   Diagnosis Date     Allergic rhinitis, cause unspecified     AIT     Anxiety     Saw therapist at  in [place] over summer 2015.  Doing  well now.  Also some marriage therpay in the past.  Tried prozac last spring and another med and didn't like the side effects -- stopped within 1 mo of starting.      Menstrual migraine      Other forms of migraine, without mention of intractable migraine without mention of status migrainosus      Rh negative state in antepartum period 2016    Rhophylac given 3/7/16     Seasonal allergies      Varicella      Past Surgical History:   Procedure Laterality Date     WISDOM TOOTH EXTRACTION       Current Outpatient Medications   Medication Sig Dispense Refill     CALCIUM PO        Cholecalciferol (VITAMIN D3 PO)        citalopram (CELEXA) 20 MG tablet Take 1.5 tablets (30 mg) by mouth daily 135 tablet 0     Ferrous Sulfate (IRON PO)        levonorgest-eth estrad 91-Day (SEASONIQUE) 0.15-0.03 &0.01 MG tablet Take 1 tablet by mouth       lisdexamfetamine (VYVANSE) 30 MG capsule Take 1 capsule (30 mg) by mouth every morning 30 capsule 0     Multiple Vitamin (MULTIVITAMIN ADULT PO)        Omega-3 1000 MG capsule Take 2 g by mouth       ZYRTEC 10 MG OR TABS 1 TABLET DAILY 30 prn       Allergies   Allergen Reactions     No Known Drug Allergies         Social History     Tobacco Use     Smoking status: Never Smoker     Smokeless tobacco: Never Used   Substance Use Topics     Alcohol use: Yes     Family History   Problem Relation Age of Onset     Allergies Mother      Depression Mother         Med managed and therapy.  Hx of hospitalization in the past with this.      Substance Abuse Mother      Hypertension Father      Allergies Father      Allergies Sister      Depression Sister      Attention Deficit Disorder Brother      Allergies Brother      Hyperlipidemia Brother      Depression Brother      No Known Problems Brother      Prostate Cancer Maternal Grandfather         lung also     Cancer Maternal Grandfather         Lung cancer.  Hx of smoking.  Dx 70 or so, and .      Circulatory Paternal Grandmother          "aneurysm     Aneurysm Paternal Grandmother          70s from this     Diabetes Paternal Grandfather         older age onset     Prostate Cancer Paternal Grandfather         pancreatic also     Cancer Paternal Grandfather         Pancreatic cancer.  Dx late 60s or early 70s.   from this.      Attention Deficit Disorder Son      Thyroid Disease Son 4     C.A.D. No family hx of      Cerebrovascular Disease No family hx of      Breast Cancer No family hx of      Cancer - colorectal No family hx of      Colon Cancer No family hx of      Heart Disease No family hx of      History   Drug Use No         Objective     /65   Pulse 59   Temp 98.1  F (36.7  C) (Oral)   Ht 1.632 m (5' 4.25\")   Wt 67.1 kg (148 lb)   BMI 25.21 kg/m      Physical Exam    GENERAL APPEARANCE: healthy, alert and no distress     EYES: EOMI, PERRL     HENT: ear canals and TM's normal and nose and mouth without ulcers or lesions     NECK: no adenopathy, no asymmetry, masses, or scars and thyroid normal to palpation     RESP: lungs clear to auscultation - no rales, rhonchi or wheezes     CV: regular rates and rhythm, normal S1 S2, no S3 or S4 and no murmur, click or rub     ABDOMEN:  soft, nontender, no HSM or masses     MS: extremities normal- no gross deformities noted, no evidence of inflammation in joints     SKIN: no suspicious lesions or rashes     NEURO: Normal strength and tone, sensory exam grossly normal, mentation intact and speech normal     PSYCH: mentation appears normal. and affect normal/bright     LYMPHATICS: No cervical adenopathy      Diagnostics:  Labs pending at this time.  Results will be reviewed when available.   EKG: Independently interpreted normal sinus rhythm, no ST or T wave changes, normal intervals.  No prior to compare.    Revised Cardiac Risk Index (RCRI):  The patient has the following serious cardiovascular risks for perioperative complications:   - No serious cardiac risks = 0 points     RCRI " Interpretation: 0 points: Class I (very low risk - 0.4% complication rate)       Signed Electronically by: Chantel Mtz DO  Copy of this evaluation report is provided to requesting physician.

## 2021-12-09 NOTE — PATIENT INSTRUCTIONS
Call and ask about a covid test    Only need to take citalopram the morning of surgery    Avoid vitamins/supplements one week prior  ------------------------------------  Preparing for Your Surgery  Getting started  A nurse will call you to review your health history and instructions. They will give you an arrival time based on your scheduled surgery time.  Please be ready to share the following:    Your doctor's clinic name and phone number    Your medical, surgical and anesthesia history    A list of allergies and sensitivities    A list of medicines, including herbal treatments and over-the-counter drugs    Whether the patient has a legal guardian (ask how to send us the papers in advance)  If you have a child who's having surgery, please ask for a copy of Preparing for Your Child's Surgery.    Preparing for surgery    Within 30 days of surgery: Have a pre-op exam (sometimes called an H&P, or History and Physical). This can be done at a clinic or pre-operative center.  ? If you're having a , you may not need this exam. Talk to your care team    At your pre-op exam, talk to your care team about all medicines you take. If you need to stop any medicines before surgery, ask when to start taking them again.  ? We do this for your safety. Many medicines can make you bleed too much during surgery. Some change how well surgery (anesthesia) drugs work.    Call your insurance company to let them know you're having surgery. (If you don't have insurance, call [phone].)    Call your clinic if there's any change in your health. This includes signs of a cold or flu (sore throat, runny nose, cough, rash, fever). It also includes a scrape or scratch near the surgery site.    If you have questions on the day of surgery, call your hospital or surgery center.  Eating and drinking guidelines  For your safety: Unless your surgeon tells you otherwise, follow the guidelines below.    Eat and drink as usual until 8 hours  before surgery. After that, no food or milk.    Drink clear liquids until 2 hours before surgery. These are liquids you can see through, like water, Gatorade and Propel Water. You may also have black coffee and tea (no cream or milk).    Nothing by mouth within 2 hours of surgery. This includes gum, candy and breath mints.    If you drink, stop drinking alcohol the night before surgery.    If your care team tells you to take medicine on the morning of surgery, it's okay to take it with a sip of water.  Preventing infection    Shower or bathe the night before and morning of your surgery. Follow the instructions your clinic gave you. (If no instructions, use regular soap.)    Don't shave or clip hair near your surgery site. We'll remove the hair if needed.    Don't smoke or vape the morning of surgery. You may chew nicotine gum up to 2 hours before surgery. A nicotine patch is okay.  ? Note: Some surgeries require you to completely quit smoking and nicotine. Check with your surgeon.    Your care team will make every effort to keep you safe from infection. We will:  ? Clean our hands often with soap and water (or an alcohol-based hand rub).  ? Clean the skin at your surgery site with a special soap that kills germs.  ? Give you a special gown to keep you warm. (Cold raises the risk of infection.)  ? Wear special hair covers, masks, gowns and gloves during surgery.  ? Give antibiotic medicine, if prescribed. Not all surgeries need antibiotics.  What to bring on the day of surgery    Photo ID and insurance card    Copy of your health care directive, if you have one    Glasses and hearing aides (bring cases)  ? You can't wear contacts during surgery    Inhaler and eye drops, if you use them (tell us about these when you arrive)    CPAP machine or breathing device, if you use them    A few personal items, if spending the night    If you have . . .  ? A pacemaker or ICD (cardiac defibrillator): Bring the ID card.  ? An  implanted stimulator: Bring the remote control.  ? A legal guardian: Bring a copy of the certified (court-stamped) guardianship papers.  Please remove any jewelry, including body piercings. Leave jewelry and other valuables at home.  If you're going home the day of surgery  Important: If you don't follow the rules below, we must cancel your surgery.     Arrange for someone to drive you home after surgery. You may not drive, take a taxi or take public transportation by yourself (unless you'll have local anesthesia only).    Arrange for a responsible adult to stay with you overnight. If you don't, we may keep you in the hospital overnight, and you may need to pay the costs yourself.  Questions?   If you have any questions for your care team, list them here: _________________________________________________________________________________________________________________________________________________________________________________________________________________________________________________________________________________________________________________________  For informational purposes only. Not to replace the advice of your health care provider. Copyright   2003, 2019 [facility]. All rights reserved. Clinically reviewed by Daniela Loyola MD. Photo Rank[employer] [id number] - REV 4/20.

## 2021-12-10 LAB
ATRIAL RATE - MUSE: 60 BPM
DIASTOLIC BLOOD PRESSURE - MUSE: NORMAL MMHG
INTERPRETATION ECG - MUSE: NORMAL
P AXIS - MUSE: 20 DEGREES
PR INTERVAL - MUSE: 158 MS
QRS DURATION - MUSE: 82 MS
QT - MUSE: 414 MS
QTC - MUSE: 414 MS
R AXIS - MUSE: 77 DEGREES
SYSTOLIC BLOOD PRESSURE - MUSE: NORMAL MMHG
T AXIS - MUSE: 42 DEGREES
VENTRICULAR RATE- MUSE: 60 BPM

## 2022-01-07 ENCOUNTER — TRANSFERRED RECORDS (OUTPATIENT)
Dept: HEALTH INFORMATION MANAGEMENT | Facility: CLINIC | Age: 42
End: 2022-01-07
Payer: COMMERCIAL

## 2022-01-09 ENCOUNTER — MYC REFILL (OUTPATIENT)
Dept: PSYCHIATRY | Facility: CLINIC | Age: 42
End: 2022-01-09
Payer: COMMERCIAL

## 2022-01-09 ENCOUNTER — HEALTH MAINTENANCE LETTER (OUTPATIENT)
Age: 42
End: 2022-01-09

## 2022-01-09 DIAGNOSIS — F41.1 GENERALIZED ANXIETY DISORDER: ICD-10-CM

## 2022-01-09 DIAGNOSIS — F90.0 ADHD (ATTENTION DEFICIT HYPERACTIVITY DISORDER), INATTENTIVE TYPE: ICD-10-CM

## 2022-01-10 ENCOUNTER — TELEPHONE (OUTPATIENT)
Dept: PSYCHIATRY | Facility: CLINIC | Age: 42
End: 2022-01-10
Payer: COMMERCIAL

## 2022-01-10 RX ORDER — LISDEXAMFETAMINE DIMESYLATE 30 MG/1
30 CAPSULE ORAL EVERY MORNING
Qty: 30 CAPSULE | Refills: 0 | Status: SHIPPED | OUTPATIENT
Start: 2022-01-10 | End: 2022-02-10

## 2022-01-10 RX ORDER — CITALOPRAM HYDROBROMIDE 20 MG/1
30 TABLET ORAL DAILY
Qty: 45 TABLET | Refills: 0 | Status: SHIPPED | OUTPATIENT
Start: 2022-01-10 | End: 2022-01-20

## 2022-01-10 NOTE — TELEPHONE ENCOUNTER
Last Seen 11/4  C Saba will schedule follow-up appointment after she has seen her primary care physician.   Cancel None  No-Show None    Next Appt 1/20    Incoming Refill From patient via MyChart    Medication Requested   lisdexamfetamine (VYVANSE) 30 MG capsule  Directions   Take 1 capsule (30 mg) by mouth every morning  Qty 30  Last Refill Per [facility]  12/8 #30, 10/13 #30    Medication Requested   citalopram (CELEXA) 20 MG tablet  Directions   Take 1.5 tablets (30 mg) by mouth daily  Qty 135  Last Refill Approx. 10/12    Medication pended and routed to provider.

## 2022-01-10 NOTE — TELEPHONE ENCOUNTER
Prior Authorization Retail Medication Request    Medication/Dose:  lisdexamfetamine (VYVANSE) 30 MG capsule: Take 1 capsule (30 mg) by mouth every morning  ICD code (if different than what is on RX): Same    Previously Tried and Failed: Adderall XR 10mg daily, Adderall XR 15mg daily, Adderall IR 5mg daily PRN    Rationale: Pt completed formal ADHD testing in 2021. Results were indicative of ADHD. Pt was started on Adderall for symptom management. Although the medication was helpful, the pt experienced loose stools and diarrhea throughout the duration of taking the medication. Adderall was discontinued and pt was started on Vyvanse 30mg daily. Pt has been tolerating the medication without SE and is reporting good benefit. Please approve this medication so that pt can continue taking without any disruptions.     Insurance Name: [employer]  Insurance ID: [id number]    Pharmacy Information (if different than what is on RX)  Name: [facility]  Phone:  Same

## 2022-01-10 NOTE — TELEPHONE ENCOUNTER
Prior Authorization Approval    Authorization Effective Date: 12/11/2021  Authorization Expiration Date: 1/10/2023  Medication: lisdexamfetamine (VYVANSE) 30 MG capsule-APPROVED  Approved Dose/Quantity:   Reference #:     Insurance Company: [employer] - Phone [phone] Fax [phone]  Expected CoPay:       CoPay Card Available:      Foundation Assistance Needed:    Which Pharmacy is filling the prescription (Not needed for infusion/clinic administered): [id number] [place] [address]  Pharmacy Notified: Yes-Pharmacy will notify patient when ready.  Patient Notified: No

## 2022-01-14 ENCOUNTER — OFFICE VISIT (OUTPATIENT)
Dept: FAMILY MEDICINE | Facility: CLINIC | Age: 42
End: 2022-01-14
Payer: COMMERCIAL

## 2022-01-14 VITALS
HEIGHT: 64 IN | DIASTOLIC BLOOD PRESSURE: 77 MMHG | WEIGHT: 149.5 LBS | BODY MASS INDEX: 25.52 KG/M2 | HEART RATE: 96 BPM | SYSTOLIC BLOOD PRESSURE: 120 MMHG

## 2022-01-14 DIAGNOSIS — G56.21 ENTRAPMENT OF RIGHT ULNAR NERVE: Primary | ICD-10-CM

## 2022-01-14 DIAGNOSIS — F33.42 RECURRENT MAJOR DEPRESSIVE DISORDER, IN FULL REMISSION (H): ICD-10-CM

## 2022-01-14 PROBLEM — M72.2 PLANTAR FASCIITIS, RIGHT: Status: RESOLVED | Noted: 2018-08-28 | Resolved: 2022-01-14

## 2022-01-14 PROBLEM — F32.0 MILD MAJOR DEPRESSION (H): Status: ACTIVE | Noted: 2019-06-26

## 2022-01-14 PROBLEM — R32 URINARY INCONTINENCE IN FEMALE: Status: ACTIVE | Noted: 2018-08-01

## 2022-01-14 PROCEDURE — 99213 OFFICE O/P EST LOW 20 MIN: CPT | Performed by: FAMILY MEDICINE

## 2022-01-14 ASSESSMENT — ANXIETY QUESTIONNAIRES
5. BEING SO RESTLESS THAT IT IS HARD TO SIT STILL: NOT AT ALL
1. FEELING NERVOUS, ANXIOUS, OR ON EDGE: SEVERAL DAYS
7. FEELING AFRAID AS IF SOMETHING AWFUL MIGHT HAPPEN: NOT AT ALL
3. WORRYING TOO MUCH ABOUT DIFFERENT THINGS: NOT AT ALL
2. NOT BEING ABLE TO STOP OR CONTROL WORRYING: NOT AT ALL
6. BECOMING EASILY ANNOYED OR IRRITABLE: SEVERAL DAYS
GAD7 TOTAL SCORE: 2

## 2022-01-14 ASSESSMENT — PATIENT HEALTH QUESTIONNAIRE - PHQ9
5. POOR APPETITE OR OVEREATING: NOT AT ALL
SUM OF ALL RESPONSES TO PHQ QUESTIONS 1-9: 1

## 2022-01-14 ASSESSMENT — MIFFLIN-ST. JEOR: SCORE: 1332.1

## 2022-01-14 NOTE — PROGRESS NOTES
Assessment & Plan     1. Entrapment of right ulnar nerve  - Orthopedic  Referral; Future     Symptoms consistent with ulnar nerve entrapment. Only experiencing pain, no numbness or weakness.   Recommend conservative measures - trying to keep arm straight, NSAIDs to decrease any inflammation, avoiding aggravating activities.  Educational information provided in AVS.  Ortho hand referral placed for further evaluation if symptoms are not improving.     2. Recurrent major depressive disorder, in full remission (H)  Symptoms well controlled on Celexa. Continue current management.     Return in about 3 months (around 4/14/2022) for Physical Exam.    Ofelia Mclain, [facility]    Sonali Walter is a 41 year old who presents for the following health issues    Chief Complaint   Patient presents with     Forearm Pain     intermittent x couple of days        HPI     Has sharp, shooting pain at least hourly throughout the day. Radiates down her ulnar arm to pinky and ring finger, a little into the base of the palm. Is right handed, right arm symptoms. Notices at night. Notices when driving. More prominent when arm is bent, may feel it more. Trying to straighten it out. No changes in activity or injuries. Came out of nowhere.     Sometimes experiences some numbness with the pain . Pain is primary concern. Weakness isn't impacting functioning.     No left arm symptoms or leg symptoms. Has never had similar symptoms in the past.     PHQ 12/1/2020 2/17/2021 1/14/2022   PHQ-9 Total Score 1 6 1   Q9: Thoughts of better off dead/self-harm past 2 weeks Not at all Not at all Not at all   Some encounter information is confidential and restricted. Go to Review [employer] activity to see all data.     NAYELY-7 SCORE 2/17/2021 5/19/2021 1/14/2022   Total Score - 4 (minimal anxiety) -   Total Score 4 - 2   Some encounter information is confidential and restricted. Go to Review [employer] activity  "to see all data.           Review of Systems   See HPI above.       Objective    /77   Pulse 96   Ht 1.632 m (5' 4.25\")   Wt 67.8 kg (149 lb 8 oz)   LMP  (LMP Unknown)   Breastfeeding No   BMI 25.46 kg/m    Body mass index is 25.46 kg/m .  Physical Exam   GENERAL: Saba is a pleasant, well appearing female, in no acute distress.  HEART: Regular rate and rhythm, no murmurs.  LUNGS: Clear to auscultation bilaterally, unlabored.   NEURO: Normal sensation C5-T1. Strength C5-T1 5/5 bilaterally.         "

## 2022-01-14 NOTE — PATIENT INSTRUCTIONS
"  Patient Education     Ulnar Nerve Palsy  The ulnar nerve travels down the arm from the shoulder to the hand. It controls movement and feeling in the wrist and hand. Damage to this nerve can cause a condition called ulnar nerve palsy.  A common cause of ulnar nerve palsy is leaning on the elbow for long periods of time. Injury to the arm or elbow, such as a fracture, can also damage the ulnar nerve.  Symptoms of ulnar nerve palsy include:    Numbness, tingling, or burning (often described as \"pins and needles\")    Pain    Weakness or muscle shrinking in the hand and fingers  The treatment depends on the cause of the nerve damage. In some cases, the problem will go away on its own once pressure to the nerve is relieved. Certain tests may be done to help determine the injury and extent of damage. These include nerve conduction studies (NCS) and electromyography (EMG). Splinting the wrist to limit movement may help with healing. If the problem is due to trauma or injury, physical therapy may be prescribed. Corticosteroid injections into the area may reduce swelling and pressure on the nerve. Surgery to repair the nerve may be needed for chronic symptoms that don't respond to simpler treatments.  Home care    Rest the wrist and arm until normal feeling and strength return.    If you were given a splint or sling, wear it as directed.    Don't lean on your elbows.    If medicines were prescribed for pain or nerve sensations, take them as directed.  Follow-up care  Follow up with your healthcare provider or as advised by our staff.  When to seek medical advice  Call your healthcare provider right away if you have any of the following:    Increasing arm swelling or pain    Numbness or weakness of the arm    Symptoms spread to other parts of the body    Slurred speech, confusion    Trouble speaking, walking, or seeing  [employer] last reviewed this educational content on 4/1/2018 2000-2021 [employer]. All " rights reserved. This information is not intended as a substitute for professional medical care. Always follow your healthcare professional's instructions.           Patient Education     What is Cubital Tunnel Syndrome?  Cubital tunnel syndrome is a set of symptoms that may occur if the ulnar nerve in your elbow gets pinched. This may happen if you bend or lean on your elbows often.     Your cubital tunnel  The cubital tunnel is a groove in a bone near your elbow. This narrow groove provides a passage for the ulnar nerve, one of the main nerves in your arm. The ulnar nerve can cause  funny bone  pain if your elbow gets bumped. Your cubital tunnel helps protect this nerve as it passes through your elbow and down to your fingers.   Compressing the ulnar nerve  Bending your elbow compresses the ulnar nerve inside the cubital tunnel. The nerve can get inflamed (irritated) after constant bending and pinching or after getting hurt. Over time, this can lead to pain or numbness. The pain is often felt in your ring fingers and little fingers.      Bending your elbow as you hold a phone can cause problems over time.      What are its symptoms?    Numbness or tingling in the ring fingers and little fingers    Loss of finger or hand strength    Inability to straighten fingers    Sharp, sudden pain when elbow is touched    Shrinking of hand muscles    The road to healing  You can keep cubital tunnel syndrome from flaring up. Keep your arm straight as much as you can, even while sleeping, to prevent pinching of the ulnar nerve. And use phone headsets and elbow pads. If you still have pain, tell your doctor.   [employer] (?????) last reviewed this educational content on 5/1/2018 2000-2021 [employer]. All rights reserved. This information is not intended as a substitute for professional medical care. Always follow your healthcare professional's instructions.

## 2022-01-15 ASSESSMENT — ANXIETY QUESTIONNAIRES: GAD7 TOTAL SCORE: 2

## 2022-01-19 NOTE — PROGRESS NOTES
VIDEO VISIT  Video- Visit Details  Type of service:  video visit for medication management  Time of service:    Date:  01/20/2022     Video Start Time:  8:39 AM     Video End Time:  8:49 AM     Reason for video visit:  Services only offered telehealth  Originating Site (patient location):  [facility]   Location- Patient's home  Distant Site (provider location):  Remote location  Mode of Communication:  Video Conference via [employer]  Consent:  Patient has given verbal consent for video visit?: Yes        [facility]  Psychiatry Clinic  MEDICAL PROGRESS NOTE     CARE TEAM:  PCP- [facility], Psychotherapist- None, however seeing ADHD   Angelic Marrero is a 41 year old who uses the name Saba and pronouns she, her, hers, herself.      DIAGNOSIS   Attention deficit hyperactivity disorder, predominantly inattentive presentation  Generalized anxiety disorder  Major depressive disorder, recurrent, full remission     ASSESSMENT   Saba is doing well today with regards to her depression and anxiety. She continues to notice some improvement with her Vyvanse, however notes that there are ongoing symptoms of ADHD and that an increase in the dose could be helpful. She was seen by her primary care physician between our visits for cardiology screening, which was normal. We will continue to titrate Vyvanse. She is not currently seeing the ADHD , however this may be something to consider continuing in the future, or possibly CBT for ADHD.     MNPMP was checked today:  Indicates controlled substance use consistent with patient report/use as prescribed. Note: patient took extra Adderall that she had instead of Vyanse during November (see 12/6/21 [facility] communication).     PLAN                                                                                                                1) Meds-  - Increase lisdexamfetamine to 40 mg daily.  - Continue citalopram 30 mg  daily.    2) Psychotherapy- Not currently seeing a therapist.     3) Next due-  Labs- N/A  EKG- PRN  Rating scales- PHQ9 and NAYELY-7 at every visit    4) Referrals-  None    5) Dispo- Follow-up in 2 months.      PERTINENT BACKGROUND                           [most recent eval 07/12/21]   Please see most recent DA for more pertinent background.  Psych pertinent item history includes multiple psychotropic trials       SUBJECTIVE   History is provided by the patient who is a good historian. The last visit was on 11/4/21 at which time no medication changes were made.    Since the last visit:  - Things have been pretty good.  - No specific symptoms to discuss today.  - Mood has been good. In terms of feeling happy and not depressed, she is feeling good.  - Anxiety is also good. Feels well-managed.  - Kids are home on distance-learning.   - Stopped meeting with ADHD  because she had a lot going on with her kids. She got some good tips from the .     RECENT PSYCH ROS:   Depression:  See HPI.  Elevated:  None elicited.  Psychosis:  Negative for auditory and visual hallucinations.  Anxiety:  See HPI.  Trauma Related:  None elicited.  Sleep: Negative for insomnia.  ADHD: Feels motivated but does not feel like she can think clearly about what would be smartest to do first. Does get off track and forgets what she is doing. Some misplacing of things (but this seems to be getting better). Needs  to do big tasks with her because they are overwhelming.       Adverse Effects: None  Pertinent Negative Symptoms: No suicidal ideation or violent ideation  .  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       Living situation- Lives in home with  and 4 children      Social/ Spiritual Support- , friends, sister, and mom     Feels Safe at Home- yes      PSYCH and SUBSTANCE USE Critical Summary Points since July 2021 7/12/21: Transfer of care diagnostic assessment.  7/22/21: Started Adderall XR 10 mg daily (recently diagnosed with ADHD, predominantly inattentive presentation).  8/05/21: Increased to Adderall XR 15 mg daily  9/08/21: Added Adderall 5 mg daily PRN and encouraged pt to connect with therapist  10/12/21: Discontinued Adderall XR 15 mg daily and Adderall 5 mg daily PRN. Started lisdexamfetamine 30 mg daily.     MEDICAL HISTORY and ALLERGY     ALLERGIES: No known drug allergies    Patient Active Problem List   Diagnosis     Allergic rhinitis     Other type of migraine     Multiple atypical nevi     Urinary incontinence in female     Mild major depression (H)     Generalized anxiety disorder     Contraception management     Anxiety        MEDICAL REVIEW OF SYSTEMS   Contraception- oral contraceptives (combined)    Gen: Negative for diaphoresis  HEENT: Negative for headache  Cardiac: Negative for  irregular heart rate, tachycardia  GI: Negative for anorexia, nausea, dry mouth, constipation, diarrhea  : Negative for sexual dysfunction  Neuro: Negative for insomnia, tremor, dizziness      MEDICATIONS     Current Outpatient Medications   Medication Sig Dispense Refill     CALCIUM PO        Cholecalciferol (VITAMIN D3 PO)        citalopram (CELEXA) 20 MG tablet Take 1.5 tablets (30 mg) by mouth daily 45 tablet 0     Ferrous Sulfate (IRON PO)        levonorgest-eth estrad 91-Day (SEASONIQUE) 0.15-0.03 &0.01 MG tablet Take 1 tablet by mouth       lisdexamfetamine (VYVANSE) 30 MG capsule Take 1 capsule (30 mg) by mouth every morning 30 capsule 0     Multiple Vitamin (MULTIVITAMIN ADULT PO)        Omega-3 1000 MG capsule Take 2 g by mouth       ZYRTEC 10 MG OR TABS 1 TABLET DAILY 30 prn       "VITALS   LMP  (LMP Unknown)     MENTAL STATUS EXAM     Alertness: alert    Appearance: well groomed  Behavior/Demeanor: cooperative, pleasant and calm, with good  eye contact   Speech: normal and regular rate and rhythm  Language: intact and no obvious problem  Psychomotor: normal or unremarkable  Mood: \"good\"  Affect: appropriate ; congruent to: mood- yes, content- yes  Thought Process/Associations: unremarkable  Thought Content:  Reports none;  Denies suicidal ideation and violent ideation  Perception:  Reports none;  Denies auditory hallucinations and visual hallucinations  Insight: excellent  Judgment: excellent  Cognition: does  appear grossly intact; formal cognitive testing was not done  Gait and Station: N/A (telehealth)     LABS and DATA     PHQ-9 TODAY = 3  PHQ 11/4/2021 1/14/2022 1/20/2022   PHQ-9 Total Score 1 1 3   Q9: Thoughts of better off dead/self-harm past 2 weeks Not at all Not at all Not at all       NAYELY-7 SCORE 2/17/2021 5/19/2021 1/14/2022   Total Score - 4 (minimal anxiety) -   Total Score 4 4 2       Recent Labs   Lab Test 12/09/21  0925 07/19/18  1120   CR 0.81 0.73   GFRESTIMATED 90 >60     Recent Labs   Lab Test 07/19/18  1120   AST 20   ALT 14   ALKPHOS 58        PSYCHOTROPIC DRUG INTERACTIONS   Per [employer]:  LISDEXAMFETAMINE and CITALOPRAM: may result in increased risk of serotonin syndrome.    MANAGEMENT:  Monitoring for adverse effects and patient is aware of risks     RISK STATEMENT for SAFETY     Angelic Marrero did not appear to be an imminent safety risk to self or others.    TREATMENT RISK STATEMENT: The risks, benefits, alternatives and potential adverse effects have been discussed and are understood by the pt. The pt understands the risks of using street drugs or alcohol. There are no medical contraindications, the pt agrees to treatment with the ability to do so. The pt knows to call the clinic for any problems or to access emergency care if needed.  Medical and substance " use concerns are documented above.  Psychotropic drug interaction check was done, including changes made today.     PROVIDER: Tara Carbone MD    Patient not staffed in clinic.  Note will be reviewed and signed by supervisor Dr. Sanford.

## 2022-01-20 ENCOUNTER — VIRTUAL VISIT (OUTPATIENT)
Dept: PSYCHIATRY | Facility: CLINIC | Age: 42
End: 2022-01-20
Attending: PSYCHIATRY & NEUROLOGY

## 2022-01-20 DIAGNOSIS — F90.0 ADHD (ATTENTION DEFICIT HYPERACTIVITY DISORDER), INATTENTIVE TYPE: ICD-10-CM

## 2022-01-20 DIAGNOSIS — F41.1 GENERALIZED ANXIETY DISORDER: ICD-10-CM

## 2022-01-20 PROCEDURE — 99214 OFFICE O/P EST MOD 30 MIN: CPT | Mod: HN | Performed by: STUDENT IN AN ORGANIZED HEALTH CARE EDUCATION/TRAINING PROGRAM

## 2022-01-20 RX ORDER — LISDEXAMFETAMINE DIMESYLATE 10 MG/1
10 CAPSULE ORAL EVERY MORNING
Qty: 30 CAPSULE | Refills: 0 | Status: SHIPPED | OUTPATIENT
Start: 2022-01-20 | End: 2022-02-10

## 2022-01-20 RX ORDER — CITALOPRAM HYDROBROMIDE 20 MG/1
30 TABLET ORAL DAILY
Qty: 135 TABLET | Refills: 0 | Status: SHIPPED | OUTPATIENT
Start: 2022-02-09 | End: 2022-03-21

## 2022-01-20 ASSESSMENT — PAIN SCALES - GENERAL: PAINLEVEL: NO PAIN (0)

## 2022-01-20 ASSESSMENT — PATIENT HEALTH QUESTIONNAIRE - PHQ9
SUM OF ALL RESPONSES TO PHQ QUESTIONS 1-9: 3
10. IF YOU CHECKED OFF ANY PROBLEMS, HOW DIFFICULT HAVE THESE PROBLEMS MADE IT FOR YOU TO DO YOUR WORK, TAKE CARE OF THINGS AT HOME, OR GET ALONG WITH OTHER PEOPLE: NOT DIFFICULT AT ALL
SUM OF ALL RESPONSES TO PHQ QUESTIONS 1-9: 3

## 2022-01-20 NOTE — PATIENT INSTRUCTIONS
**For crisis resources, please see the information at the end of this document**   Patient Education    Thank you for coming to the [facility] [place] [facility].    Lab Testing:  If you had lab testing today and your results are reassuring or normal they will be mailed to you or sent through [employer] within 7 days. If the lab tests need quick action we will call you with the results. The phone number we will call with results is # [phone] (home) . If this is not the best number please call our clinic and change the number.    Medication Refills:  If you need any refills please call your pharmacy and they will contact us. Our fax number for refills is [phone]. Please allow three business for refill processing. If you need to  your refill at a new pharmacy, please contact the new pharmacy directly. The new pharmacy will help you get your medications transferred.     Scheduling:  If you have any concerns about today's visit or wish to schedule another appointment please call our office during normal business hours [phone] (8-5:00 M-F)    Contact Us:  Please call [phone] during business hours (8-5:00 M-F).  If after clinic hours, or on the weekend, please call  [phone].    Financial Assistance [phone]  [facility] Billing [phone]  Central Billing Office, [facility]: [phone]  [place] Billing [phone]  Medical Records [phone]  [place] Patient Bill of Rights [email or web address]       MENTAL HEALTH CRISIS NUMBERS:  For a medical emergency please call  911 or go to the nearest ER.     [facility]:   [facility] -[phone]   Crisis Residence [facility] -[phone]   Walk-In Counseling Center [facility] -[phone]   COPE 24/7 [place] Mobile Team -[phone] (adults)/[phone] (child)  CHILD: [facility] needs assessment team -  [phone]      [facility]:   [facility] - [phone]   Walk-in counseling [facility] - [phone]   Walk-in counseling [facility] - [phone]   Crisis Residence [facility] - [phone]  [facility]-[phone] mobile unit/ 24/7 crisis line    National Crisis Numbers:   National Suicide Prevention Lifeline: 1-781-128-TALK (651-359-8919)  Poison Control Center - 4-922-123-0593  [employer]/resources for a list of additional resources (SOS)  Trans Lifeline a hotline for transgender people 4-466-235-1124  The [employer] a hotline for LGBT youth 2-512-585-3587  Crisis Text Line: For any crisis 24/7   To: 657058  see www.crisistextline.org  - IF MAKING A CALL FEELS TOO HARD, send a text!         Again thank you for choosing [facility] MENTAL HEALTH & ADDICTION [facility] and please let us know how we can best partner with you to improve you and your family's health.    You may be receiving a survey regarding this appointment. We would love to have your feedback, both positive and negative. The survey is done by an external company, so your answers are anonymous.

## 2022-01-20 NOTE — PROGRESS NOTES
"VIDEO VISIT  Angelic Marrero is a 41 year old patient that has consented to receive services via billable video visit.      The patient has been notified of following:   \"This video visit will be conducted via a call between you and your physician/provider. We have found that certain health care needs can be provided without the need for an in-person physical exam. This service lets us provide the care you need with a video conversation. If a prescription is necessary we can send it directly to your pharmacy. If lab work is needed we can place an order for that and you can then stop by our lab to have the test done at a later time. Insurers are generally covering virtual visits as they would in-office visits so billing should not be different than normal.  If for some reason you do get billed incorrectly, you should contact the billing office to correct it and that number is in the AVS .    Patient will join video visit via:  [employer]t (Patient / guardian confirmed to join via fotobabble)    If patient attempts to join the video via fotobabble at appointment start time, but is unable to, they would prefer that the provider send them a video invitation via:   Send to preferred e-mail: [email or web address]      How would patient like to obtain AVS?:  MyChart    "

## 2022-01-21 ASSESSMENT — PATIENT HEALTH QUESTIONNAIRE - PHQ9: SUM OF ALL RESPONSES TO PHQ QUESTIONS 1-9: 3

## 2022-02-08 ENCOUNTER — E-VISIT (OUTPATIENT)
Dept: FAMILY MEDICINE | Facility: CLINIC | Age: 42
End: 2022-02-08
Payer: COMMERCIAL

## 2022-02-08 DIAGNOSIS — N92.1 METRORRHAGIA: Primary | ICD-10-CM

## 2022-02-08 PROCEDURE — 99207 PR NON-BILLABLE SERV PER CHARTING: CPT | Performed by: FAMILY MEDICINE

## 2022-02-09 ENCOUNTER — MYC MEDICAL ADVICE (OUTPATIENT)
Dept: FAMILY MEDICINE | Facility: CLINIC | Age: 42
End: 2022-02-09
Payer: COMMERCIAL

## 2022-02-09 NOTE — PATIENT INSTRUCTIONS
Thank you for choosing us for your care. I think an in-clinic visit would be best next steps based on your symptoms. Please schedule a clinic appointment; you won t be charged for this eVisit.      Saba, the visit does not have to be in person, can be virtual, but this will require a bit of back-and-forth discussion.    You can schedule an appointment right here in [place], or call [phone]

## 2022-02-10 DIAGNOSIS — F90.0 ADHD (ATTENTION DEFICIT HYPERACTIVITY DISORDER), INATTENTIVE TYPE: ICD-10-CM

## 2022-02-10 RX ORDER — LISDEXAMFETAMINE DIMESYLATE 10 MG/1
10 CAPSULE ORAL EVERY MORNING
Qty: 30 CAPSULE | Refills: 0 | Status: SHIPPED | OUTPATIENT
Start: 2022-02-10 | End: 2022-03-21

## 2022-02-10 RX ORDER — LISDEXAMFETAMINE DIMESYLATE 40 MG/1
40 CAPSULE ORAL EVERY MORNING
Qty: 30 CAPSULE | Refills: 0 | Status: SHIPPED | OUTPATIENT
Start: 2022-02-10 | End: 2022-03-08

## 2022-02-16 ENCOUNTER — VIRTUAL VISIT (OUTPATIENT)
Dept: FAMILY MEDICINE | Facility: CLINIC | Age: 42
End: 2022-02-16
Payer: COMMERCIAL

## 2022-02-16 DIAGNOSIS — Z30.41 ENCOUNTER FOR SURVEILLANCE OF CONTRACEPTIVE PILLS: ICD-10-CM

## 2022-02-16 DIAGNOSIS — N92.1 METRORRHAGIA: Primary | ICD-10-CM

## 2022-02-16 PROCEDURE — 99213 OFFICE O/P EST LOW 20 MIN: CPT | Mod: 95 | Performed by: FAMILY MEDICINE

## 2022-02-16 NOTE — PROGRESS NOTES
"Saba is a 41 year old who is being evaluated via a billable video visit.      How would you like to obtain your AVS? MyChart  If the video visit is dropped, the invitation should be resent by: Text to cell phone: [phone]  Will anyone else be joining your video visit? No      Video Start Time: 9:31 AM    Assessment & Plan     Metrorrhagia  Discussed options including performing a work-up with thyroid hormone level, platelet count, PT/INR.  Discussed that these are unlikely to be the cause of her spotting.  More likely, her OCPs simply not holding her uterine lining.  Discussed that with continued use, this would likely improve, or we could change to a higher dose estrogen preparation.  Patient would like to try the latter.  If this is ineffective, she may wish to consider a low-dose OCP and cycle this to have her menses monthly.  Ultimately if she continues to have intermenstrual spotting, endometrial biopsy will be warranted.  - norethindrone-ethinyl estradiol (ORTHO-NOVUM) 1-35 MG-MCG tablet; Take 1 tablet by mouth daily Use continuously to have menses every 3 months    Encounter for surveillance of contraceptive pills  Patient will try going up to a higher dose estrogen preparation.  She would like to continue to cycle her OCP to have her menses every 3 months if possible.  - norethindrone-ethinyl estradiol (ORTHO-NOVUM) 1-35 MG-MCG tablet; Take 1 tablet by mouth daily Use continuously to have menses every 3 months             BMI:   Estimated body mass index is 25.46 kg/m  as calculated from the following:    Height as of 1/14/22: 1.632 m (5' 4.25\").    Weight as of 1/14/22: 67.8 kg (149 lb 8 oz).         Return in about 4 weeks (around 3/16/2022) for Routine preventive.    Erica Martinez MD  [facility]    Subjective   Saba is a 41 year old who presents for the following health issues     HPI      Patient presents today for a visit to discuss vaginal spotting.  She has been " taking Seasonique, a 30 mcg estrogen preparation, since February 2021.  Over the past 4 weeks, she has had some light spotting.  She thinks that when she first started Seasonique, she possibly had some spotting, but has not had any issues since that time.  Bleeding is not heavy, she is not having menstrual cramping.  She would have migraine headaches during the week of her menses previously, and so desire to cycle her OCP to have her menses every 3 months.  She has tried an IUD in the past but had consistent spotting with this as well and overall did not like how this made her feel.  She denies easy bruising or bleeding otherwise.  She has no other questions or concerns today.    Review of Systems   Constitutional, HEENT, cardiovascular, pulmonary, gi and gu systems are negative, except as otherwise noted.      Objective           Vitals:  No vitals were obtained today due to virtual visit.    Physical Exam   GENERAL: Healthy, alert and no distress  EYES: Eyes grossly normal to inspection.  No discharge or erythema, or obvious scleral/conjunctival abnormalities.  RESP: No audible wheeze, cough, or visible cyanosis.  No visible retractions or increased work of breathing.    SKIN: Visible skin clear. No significant rash, abnormal pigmentation or lesions.  NEURO: Cranial nerves grossly intact.  Mentation and speech appropriate for age.  PSYCH: Mentation appears normal, affect normal/bright, judgement and insight intact, normal speech and appearance well-groomed.    Diagnostics: None        Video-Visit Details    Type of service:  Video Visit    Video End Time:9:43 AM    Originating Location (pt. Location): Home    Distant Location (provider location):  [facility]     Platform used for Video Visit: [employer]

## 2022-02-20 PROBLEM — N92.1 METRORRHAGIA: Status: ACTIVE | Noted: 2022-02-20

## 2022-02-28 ENCOUNTER — TRANSFERRED RECORDS (OUTPATIENT)
Dept: HEALTH INFORMATION MANAGEMENT | Facility: CLINIC | Age: 42
End: 2022-02-28
Payer: COMMERCIAL

## 2022-03-18 NOTE — PROGRESS NOTES
Video- Visit Details  Type of service:  video visit for medication management  Time of service:    Date:  03/21/2022    Video Start Time:  8:43 AM        Video End Time:  8:53 AM     Reason for video visit:  Services only offered telehealth  Originating Site (patient location):  [facility]   Location- Patient's home  Distant Site (provider location):  [facility]  Mode of Communication:  Video Conference via AmWell  Consent:  Patient has given verbal consent for video visit?: Yes          [facility]  Psychiatry Clinic  MEDICAL PROGRESS NOTE     CARE TEAM:  PCP- [facility], Psychotherapist- None, however seeing ADHD   Angelic Marrero is a 41 year old who uses the name Saba and pronouns she, her, hers, herself.      DIAGNOSIS   Attention deficit hyperactivity disorder, predominantly inattentive presentation  Generalized anxiety disorder  Major depressive disorder, recurrent, full remission     ASSESSMENT   Saba is doing well on her current dose of Vyvanse. Her depression and anxiety remain well-treated. She is not experiencing any medication side effects that bother her. There are no acute safety concerns. There is room for increasing the dose of Vyvanse, however it is not clear whether her remaining symptoms are related to ADHD or to being busy. We discussed that there is a degree of perfectionism that contributes to her inability to get started on tasks. I still think that pursuing therapy or working with an ADHD  would be a helpful intervention. We discussed having one more follow-up appointment prior to the end of the academic year. Saba may transition her care to her primary care physician after that appointment given her stability.      MNPMP was checked today:  Indicates taking controlled medication as prescribed.     PLAN                                                                                                                1)  "Meds-  - Continue lisdexamfetamine 50 mg daily.   - Continue citalopram 30 mg daily.    2) Psychotherapy- Not currently seeing a therapist.     3) Next due-  Labs- N/A  EKG- PRN  Rating scales- PHQ9 and NAYELY-7 at every visit    4) Referrals-  None    5) Dispo- Follow-up in 3 months.      PERTINENT BACKGROUND                           [most recent eval 07/12/21]   Please see most recent DA for more pertinent background.  Psych pertinent item history includes multiple psychotropic trials       SUBJECTIVE   - She is doing well.  - She feels that the new dose of Vyvanse is working really well. She still struggles with thinking about what she wants to say, gets distracted pretty easily and cannot form thoughts. (She has previously described this as not being able to think clearly about what would be smartest to do next.) She is \"resigned to the fact that it is not going to get better\" and then states that she also does not have a lot of time so is not sure how that affects her perception of her symptoms.   - She feels motivated, gets stuff done, and feels \"really good\" with the 50 mg of Vyvanse.  - Things feel manageable.  - sleeping    RECENT PSYCH ROS:   Depression:   No depression, \"feel[s] great\"  Anxiety:  Anxiety is very manageable, does not have it all of the time, jd by making lists. She is still feeling overwhelmed by tasks.   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       Living situation- Lives in home " with  and 4 children      Social/ Spiritual Support- , friends, sister, and mom     Feels Safe at Home- yes      PSYCH and SUBSTANCE USE Critical Summary Points since July 2021 7/12/21: Transfer of care diagnostic assessment.  7/22/21: Started Adderall XR 10 mg daily (recently diagnosed with ADHD, predominantly inattentive presentation).  8/05/21: Increased to Adderall XR 15 mg daily  9/08/21: Added Adderall 5 mg daily PRN and encouraged pt to connect with therapist  10/12/21: Discontinued Adderall XR 15 mg daily and Adderall 5 mg daily PRN. Started lisdexamfetamine 30 mg daily.  1/20/22: Increased lisdexamfetamine to 40 mg daily.   2/10/22: Increased lisdexamfetamine to 50 mg daily.      MEDICAL HISTORY and ALLERGY     ALLERGIES: No known drug allergies    Patient Active Problem List   Diagnosis     Allergic rhinitis     Other type of migraine     Multiple atypical nevi     Urinary incontinence in female     Mild major depression (H)     Generalized anxiety disorder     Contraception management     Metrorrhagia        MEDICAL REVIEW OF SYSTEMS   Contraception- oral contraceptives (combined)    Gen: Negative for diaphoresis  HEENT: Negative for headache  Cardiac: Negative for irregular heart rate, tachycardia  GI: Slight loss of appetite (does not snack anymore). Negative for nausea, dry mouth, constipation, diarrhea  : Negative for sexual dysfunction  Neuro: Negative for insomnia, tremor, dizziness      MEDICATIONS     Current Outpatient Medications   Medication Sig Dispense Refill     CALCIUM PO        Cholecalciferol (VITAMIN D3 PO)        citalopram (CELEXA) 20 MG tablet Take 1.5 tablets (30 mg) by mouth daily 135 tablet 0     Ferrous Sulfate (IRON PO)        lisdexamfetamine (VYVANSE) 50 MG capsule Take 1 capsule (50 mg) by mouth every morning 30 capsule 0     Multiple Vitamin (MULTIVITAMIN ADULT PO)        norethindrone-ethinyl estradiol (ORTHO-NOVUM) 1-35 MG-MCG tablet Take 1 tablet by  "mouth daily Use continuously to have menses every 3 months 112 tablet 1     Omega-3 1000 MG capsule Take 2 g by mouth       ZYRTEC 10 MG OR TABS 1 TABLET DAILY 30 prn     lisdexamfetamine (VYVANSE) 10 MG capsule Take 1 capsule (10 mg) by mouth every morning (Patient not taking: Reported on 3/21/2022) 30 capsule 0      VITALS   There were no vitals taken for this visit.    MENTAL STATUS EXAM     Alertness: alert    Appearance: well groomed  Behavior/Demeanor: cooperative, pleasant and calm, with good  eye contact   Speech: normal and regular rate and rhythm  Language: intact and no obvious problem  Psychomotor: normal or unremarkable  Mood: \"feel great\"  Affect: appropriate ; congruent to: mood- yes, content- yes  Thought Process/Associations: unremarkable  Thought Content:  Reports none;  Denies suicidal ideation and violent ideation  Perception:  Reports none;  Denies auditory hallucinations and visual hallucinations  Insight: excellent  Judgment: excellent  Cognition: does  appear grossly intact; formal cognitive testing was not done  Gait and Station: N/A (telehealth)     LABS and DATA     PHQ-9 TODAY = questionnaires not completed prior to appointment  PHQ 11/4/2021 1/14/2022 1/20/2022   PHQ-9 Total Score 1 1 3   Q9: Thoughts of better off dead/self-harm past 2 weeks Not at all Not at all Not at all       NAYELY-7 SCORE 2/17/2021 5/19/2021 1/14/2022   Total Score - 4 (minimal anxiety) -   Total Score 4 4 2       Recent Labs   Lab Test 12/09/21  0925 07/19/18  1120   CR 0.81 0.73   GFRESTIMATED 90 >60     Recent Labs   Lab Test 07/19/18  1120   AST 20   ALT 14   ALKPHOS 58        PSYCHOTROPIC DRUG INTERACTIONS   Per [employer]:  LISDEXAMFETAMINE and CITALOPRAM: may result in increased risk of serotonin syndrome.    MANAGEMENT:  Monitoring for adverse effects and patient is aware of risks     RISK STATEMENT for SAFETY     Angelic Marrero did not appear to be an imminent safety risk to self or others.    TREATMENT " RISK STATEMENT: The risks, benefits, alternatives and potential adverse effects have been discussed and are understood by the pt. The pt understands the risks of using street drugs or alcohol. There are no medical contraindications, the pt agrees to treatment with the ability to do so. The pt knows to call the clinic for any problems or to access emergency care if needed.  Medical and substance use concerns are documented above.  Psychotropic drug interaction check was done, including changes made today.     PROVIDER: Tara Carbone MD    Patient not staffed in clinic.  Note will be reviewed and signed by supervisor Dr. Sanford.

## 2022-03-21 ENCOUNTER — VIRTUAL VISIT (OUTPATIENT)
Dept: PSYCHIATRY | Facility: CLINIC | Age: 42
End: 2022-03-21
Attending: PSYCHIATRY & NEUROLOGY
Payer: COMMERCIAL

## 2022-03-21 DIAGNOSIS — F41.1 GENERALIZED ANXIETY DISORDER: ICD-10-CM

## 2022-03-21 DIAGNOSIS — F90.0 ADHD (ATTENTION DEFICIT HYPERACTIVITY DISORDER), INATTENTIVE TYPE: ICD-10-CM

## 2022-03-21 PROCEDURE — 99213 OFFICE O/P EST LOW 20 MIN: CPT | Mod: GT | Performed by: STUDENT IN AN ORGANIZED HEALTH CARE EDUCATION/TRAINING PROGRAM

## 2022-03-21 RX ORDER — LISDEXAMFETAMINE DIMESYLATE 50 MG/1
50 CAPSULE ORAL EVERY MORNING
Qty: 30 CAPSULE | Refills: 0 | Status: SHIPPED | OUTPATIENT
Start: 2022-06-06 | End: 2022-08-15

## 2022-03-21 RX ORDER — CITALOPRAM HYDROBROMIDE 20 MG/1
30 TABLET ORAL DAILY
Qty: 135 TABLET | Refills: 0 | Status: SHIPPED | OUTPATIENT
Start: 2022-05-09 | End: 2022-06-29

## 2022-03-21 RX ORDER — LISDEXAMFETAMINE DIMESYLATE 50 MG/1
50 CAPSULE ORAL EVERY MORNING
Qty: 30 CAPSULE | Refills: 0 | Status: SHIPPED | OUTPATIENT
Start: 2022-04-07 | End: 2022-07-13

## 2022-03-21 RX ORDER — LISDEXAMFETAMINE DIMESYLATE 50 MG/1
50 CAPSULE ORAL EVERY MORNING
Qty: 30 CAPSULE | Refills: 0 | Status: SHIPPED | OUTPATIENT
Start: 2022-05-07 | End: 2022-08-15

## 2022-03-21 NOTE — PROGRESS NOTES
Angelic Marrero is a 41 year old who has consented to receive services via billable video visit.      Pt will join video visit via: [employer]  If there are problems joining the visit, send backup video invite via: Send to preferred e-mail: [email or web address]      Originating Location (patient location): Patient's home  Distant Location (provider location): [facility] [place] [facility]    Will anyone else be joining the video visit? No    How would you prefer to obtain AVS?: Andry

## 2022-03-21 NOTE — PATIENT INSTRUCTIONS
**For crisis resources, please see the information at the end of this document**   Patient Education    Thank you for coming to the [facility] [place] [facility].    Lab Testing:  If you had lab testing today and your results are reassuring or normal they will be mailed to you or sent through [employer] within 7 days. If the lab tests need quick action we will call you with the results. The phone number we will call with results is # [phone]. If this is not the best number please call our clinic and change the number.     Medication Refills:  If you need any refills please call your pharmacy and they will contact us. Our fax number for refills is [phone]. Please allow three business days for refill processing.   If you need to change to a different pharmacy, please contact the new pharmacy directly. The new pharmacy will help you get your medications transferred.     Contact Us:  Please call [phone] during business hours (8-5:00 M-F).  If you have medication related questions after clinic hours, or on the weekend, please call [phone].    Financial Assistance [phone]  Medical Records [phone]       MENTAL HEALTH CRISIS RESOURCES:  For a emergency help, please call 911 or go to the nearest Emergency Department.     Emergency Walk-In Options:   Em[facility] Unit @ [facility] ([place]): [phone] - Specialized mental health emergency area designed to be calming  [facility] ([place]): [phone]  [facility] [place] [place] [facility] ([place]): [phone]  [facility]): [phone]    County Crisis Information:   [place]: [phone]  [place]: [phone]  [place] - Adult: [phone]     Child: [phone]  [place] - Adult: [phone]     Child: [phone]  [place]: [phone]  List of all [facility] resources:    https://[place].gov/dhs/people-we-serve/adults/health-care/mental-health/resources/crisis-contacts.jsp    National Crisis Information:   Crisis Text Line: Text  [place]  to 068816  National Suicide Prevention Lifeline: 1-404-971-TALK (1-437.445.3603)       For online chat options, visit https://suicidepreventionlifeline.org/chat/  Poison Control Center: 2-717-109-4159  Trans Lifeline: 0-733-622-1148 - Hotline for transgender people of all ages  The [employer] Project: 9-978-338-2027 - Hotline for LGBT youth     For Non-Emergency Support:   Fast Tracker: Mental Health & Substance Use Disorder Resources -   https://www.[employer].org/

## 2022-03-31 ENCOUNTER — OFFICE VISIT (OUTPATIENT)
Dept: FAMILY MEDICINE | Facility: CLINIC | Age: 42
End: 2022-03-31
Payer: COMMERCIAL

## 2022-03-31 VITALS
HEIGHT: 65 IN | SYSTOLIC BLOOD PRESSURE: 110 MMHG | DIASTOLIC BLOOD PRESSURE: 70 MMHG | WEIGHT: 146 LBS | BODY MASS INDEX: 24.32 KG/M2

## 2022-03-31 DIAGNOSIS — Z82.49 FAMILY HISTORY OF BRAIN ANEURYSM: ICD-10-CM

## 2022-03-31 DIAGNOSIS — Z12.31 ENCOUNTER FOR SCREENING MAMMOGRAM FOR BREAST CANCER: ICD-10-CM

## 2022-03-31 DIAGNOSIS — Z00.00 ANNUAL PHYSICAL EXAM: Primary | ICD-10-CM

## 2022-03-31 PROCEDURE — 99396 PREV VISIT EST AGE 40-64: CPT | Performed by: FAMILY MEDICINE

## 2022-03-31 ASSESSMENT — ANXIETY QUESTIONNAIRES
5. BEING SO RESTLESS THAT IT IS HARD TO SIT STILL: NOT AT ALL
6. BECOMING EASILY ANNOYED OR IRRITABLE: SEVERAL DAYS
1. FEELING NERVOUS, ANXIOUS, OR ON EDGE: SEVERAL DAYS
GAD7 TOTAL SCORE: 3
IF YOU CHECKED OFF ANY PROBLEMS ON THIS QUESTIONNAIRE, HOW DIFFICULT HAVE THESE PROBLEMS MADE IT FOR YOU TO DO YOUR WORK, TAKE CARE OF THINGS AT HOME, OR GET ALONG WITH OTHER PEOPLE: NOT DIFFICULT AT ALL
3. WORRYING TOO MUCH ABOUT DIFFERENT THINGS: NOT AT ALL
2. NOT BEING ABLE TO STOP OR CONTROL WORRYING: NOT AT ALL
7. FEELING AFRAID AS IF SOMETHING AWFUL MIGHT HAPPEN: NOT AT ALL

## 2022-03-31 ASSESSMENT — PATIENT HEALTH QUESTIONNAIRE - PHQ9
5. POOR APPETITE OR OVEREATING: SEVERAL DAYS
SUM OF ALL RESPONSES TO PHQ QUESTIONS 1-9: 2

## 2022-03-31 NOTE — PROGRESS NOTES
SUBJECTIVE:   CC: Angelic Marrero is an 41 year old woman who presents for preventive health visit.     Chief Complaints and History of Present Illnesses   Patient presents with     Physical        Patient has been advised of split billing requirements and indicates understanding: Yes     Healthy Habits:     Getting at least 3 servings of Calcium per day:  Yes    Bi-annual eye exam:  NO    Dental care twice a year:  Yes    Sleep apnea or symptoms of sleep apnea:  None    Diet:  Regular (no restrictions)    Frequency of exercise:  1 day/week    Duration of exercise:  Less than 15 minutes    Taking medications regularly:  Yes    Medication side effects:  None    PHQ-2 Total Score: 0    Additional concerns today:  No    Receiving Vyvanse from Tara Carbone, psychiatry.   Recently received birth control refill. Doesn't need refill today.  Incontinence symptoms resolved s/p sling.  Following with dermatology for skin checks. Is having dysplastic moles.     Today's PHQ-2 Score:   PHQ-2 ( 1999 Pfizer) 3/31/2022   Q1: Little interest or pleasure in doing things 0   Q2: Feeling down, depressed or hopeless 0   PHQ-2 Score 0   Q1: Little interest or pleasure in doing things Not at all   Q2: Feeling down, depressed or hopeless Not at all   PHQ-2 Score 0     PHQ 12/1/2020 2/17/2021 1/14/2022   PHQ-9 Total Score 1 6 1   Q9: Thoughts of better off dead/self-harm past 2 weeks Not at all Not at all Not at all   Some encounter information is confidential and restricted. Go to Review Flowsheets activity to see all data.     PHQ9: 2/27    NAYELY-7 SCORE 2/17/2021 5/19/2021 1/14/2022   Total Score - 4 (minimal anxiety) -   Total Score 4 - 2   Some encounter information is confidential and restricted. Go to Review Flowsheets activity to see all data.     GAD7: 3/21    Abuse: Current or Past (Physical, Sexual or Emotional) - No  Do you feel safe in your environment? Yes    Have you ever done Advance Care Planning? (For example, a Health  Directive, POLST, or a discussion with a medical provider or your loved ones about your wishes): No, advance care planning information given to patient to review.  Patient declined advance care planning discussion at this time.    Social History     Tobacco Use     Smoking status: Never Smoker     Smokeless tobacco: Never Used   Substance Use Topics     Alcohol use: Yes     If you drink alcohol do you typically have >3 drinks per day or >7 drinks per week? No    Alcohol Use 3/31/2022   Prescreen: >3 drinks/day or >7 drinks/week? No   Prescreen: >3 drinks/day or >7 drinks/week? -       Reviewed orders with patient.  Reviewed health maintenance and updated orders accordingly - Yes    Lipids: checked 12/9/21 - normal range   The 10-year ASCVD risk score (Felton ORR JrShelton, et al., 2013) is: 0.2%    Values used to calculate the score:      Age: 41 years      Sex: Female      Is Non- : No      Diabetic: No      Tobacco smoker: No      Systolic Blood Pressure: 110 mmHg      Is BP treated: No      HDL Cholesterol: 59 mg/dL      Total Cholesterol: 153 mg/dL      Glucose: 85 on 12/9/21    Breast Cancer Screening:  Any new diagnosis of family breast, ovarian, or bowel cancer? No    Breast CA Risk Assessment (FHS-7) 12/4/2021   Do you have a family history of breast, colon, or ovarian cancer? No / Unknown       Mammogram Screening - Offered annual screening and updated Health Maintenance for mutual plan based on risk factor consideration    Pertinent mammograms are reviewed under the imaging tab.    History of abnormal Pap smear: NO - age 30-65 PAP every 5 years with negative HPV co-testing recommended  PAP / HPV Latest Ref Rng & Units 12/1/2020 8/10/2015 12/19/2006   PAP Negative for squamous intraepithelial lesion or malignancy. Negative for squamous intraepithelial lesion or malignancy  Electronically signed by Nancy Arce CT (ASCP) on 12/8/2020 at  3:14 PM   Negative for squamous intraepithelial  "lesion or malignancy  Electronically signed by Kristy Holder CT (ASCP) on 8/20/2015 at  3:44 PM   -   PAP (Historical) - - - NIL   HPV16 NEG Negative - -   HPV18 NEG Negative - -   HRHPV NEG Negative - -     Reviewed and updated as needed this visit by clinical staff   Tobacco  Allergies  Meds  Problems  Med Hx  Surg Hx  Fam Hx  Soc   Hx        Reviewed and updated as needed this visit by Provider   Tobacco  Allergies  Meds  Problems  Med Hx  Surg Hx  Fam Hx  Soc   Hx           Review of Systems  10 point ROS negative except for as reported above.      OBJECTIVE:   /70 (BP Location: Left arm, Patient Position: Sitting, Cuff Size: Adult Regular)   Ht 1.657 m (5' 5.25\")   Wt 66.2 kg (146 lb)   LMP  (LMP Unknown)   Breastfeeding No   BMI 24.11 kg/m    Physical Exam  GENERAL: healthy, alert and no distress  EYES: Eyes grossly normal to inspection, PERRL and conjunctivae and sclerae normal  HENT: ear canals and TM's normal, nose and mouth without ulcers or lesions  NECK: no adenopathy, no asymmetry, masses, or scars and thyroid normal to palpation  RESP: lungs clear to auscultation - no rales, rhonchi or wheezes  BREAST: deferred   CV: regular rate and rhythm, normal S1 S2, no S3 or S4, no murmur, click or rub, no peripheral edema and peripheral pulses strong  ABDOMEN: soft, nontender, no hepatosplenomegaly, no masses and bowel sounds normal   (female): deferred  MS: no gross musculoskeletal defects noted, no edema  NEURO: Normal strength and tone, mentation intact and speech normal  PSYCH: mentation appears normal, affect normal/bright      ASSESSMENT/PLAN:     1. Annual physical exam  - REVIEW OF HEALTH MAINTENANCE PROTOCOL ORDERS    Reviewed health history and health maintenance recommendations.  Pretty much up-to-date on everything except for mammogram, orders placed today.  Encouraged healthy lifestyle modifications.  Discussed strength training for increasing basal metabolic rate and " "helping with weight management.    2. Family history of brain aneurysm  - MRA Brain ([place]) w Contrast; Future    Family history of brain aneurysm with paternal grandmother dying of a brain aneurysm.  Grandmother's father also had a brain aneurysm.  On review of family history, it sounds like there are several relatives on that side of the family, possibly an underlying hereditary disorder.  Patient's father has not yet been screened.    Patient is concerned about risk of a brain aneurysm and is interested in pursuing screening.  Orders placed today for screening.  May require prior authorization for imaging to be covered.  Encouraged to confirm cost prior to scheduling.    3. Encounter for screening mammogram for breast cancer  - MA Screening Digital Bilateral; Future       COUNSELING:  Reviewed preventive health counseling, as reflected in patient instructions       Regular exercise       Healthy diet/nutrition       Vision screening       Alcohol Use       Contraception       Colorectal Cancer Screening       The 10-year ASCVD risk score (Felton ORR Jr., et al., 2013) is: 0.2%    Values used to calculate the score:      Age: 41 years      Sex: Female      Is Non- : No      Diabetic: No      Tobacco smoker: No      Systolic Blood Pressure: 110 mmHg      Is BP treated: No      HDL Cholesterol: 59 mg/dL      Total Cholesterol: 153 mg/dL    Estimated body mass index is 24.11 kg/m  as calculated from the following:    Height as of this encounter: 1.657 m (5' 5.25\").    Weight as of this encounter: 66.2 kg (146 lb).        She reports that she has never smoked. She has never used smokeless tobacco.      Counseling Resources:  ATP IV Guidelines  Pooled Cohorts Equation Calculator  Breast Cancer Risk Calculator  BRCA-Related Cancer Risk Assessment: FHS-7 Tool  FRAX Risk Assessment  ICSI Preventive Guidelines  Dietary Guidelines for Americans, 2010  USDA's MyPlate  ASA Prophylaxis  Lung CA " Screening    Ofelia Mclain [facility]

## 2022-04-01 ASSESSMENT — ANXIETY QUESTIONNAIRES: GAD7 TOTAL SCORE: 3

## 2022-04-06 ENCOUNTER — ANCILLARY PROCEDURE (OUTPATIENT)
Dept: MAMMOGRAPHY | Facility: CLINIC | Age: 42
End: 2022-04-06
Attending: FAMILY MEDICINE
Payer: COMMERCIAL

## 2022-04-06 DIAGNOSIS — Z12.31 ENCOUNTER FOR SCREENING MAMMOGRAM FOR BREAST CANCER: ICD-10-CM

## 2022-04-06 PROCEDURE — 77067 SCR MAMMO BI INCL CAD: CPT

## 2022-04-14 ENCOUNTER — ANCILLARY PROCEDURE (OUTPATIENT)
Dept: MAMMOGRAPHY | Facility: CLINIC | Age: 42
End: 2022-04-14
Attending: FAMILY MEDICINE
Payer: COMMERCIAL

## 2022-04-14 DIAGNOSIS — N64.89 BREAST ASYMMETRY: ICD-10-CM

## 2022-04-14 PROCEDURE — 76642 ULTRASOUND BREAST LIMITED: CPT | Mod: RT

## 2022-04-14 PROCEDURE — 77061 BREAST TOMOSYNTHESIS UNI: CPT | Mod: RT

## 2022-04-22 NOTE — PROGRESS NOTES
Subjective:  HPI  Physical Exam                    Objective:  System    Physical Exam    General     ROS    Assessment/Plan:    DISCHARGE REPORT    Progress reporting period is from 12/2/21 to 4/22/22.       SUBJECTIVE  Subjective changes noted by patient:  unknown.  Subjective: see ie    Current pain level is NA  .     Previous pain level was  NA  .   Changes in function:  None  Adverse reaction to treatment or activity: None    OBJECTIVE  Changes noted in objective findings:  Patient has failed to return to therapy so current objective findings are unknown.  Objective: see ie     ASSESSMENT/PLAN  Updated problem list and treatment plan: Diagnosis 1:  L hip  Pain -  manual therapy, self management, education and home program  Decreased ROM/flexibility - manual therapy and therapeutic exercise  Decreased strength - therapeutic exercise and therapeutic activities  STG/LTGs have been met or progress has been made towards goals:  Yes (See Goal flow sheet completed today.)  Assessment of Progress: The patient has not returned to therapy. Current status is unknown.  Self Management Plans:  Patient  has been instructed in self management of symptoms.  Pt did not return to PT  Angelic continues to require the following intervention to meet STG and LTG's:  PT    Recommendations:  Discharge    Please refer to the daily flowsheet for treatment today, total treatment time and time spent performing 1:1 timed codes.

## 2022-06-29 ENCOUNTER — MYC REFILL (OUTPATIENT)
Dept: PSYCHIATRY | Facility: CLINIC | Age: 42
End: 2022-06-29

## 2022-06-29 DIAGNOSIS — F41.1 GENERALIZED ANXIETY DISORDER: ICD-10-CM

## 2022-06-30 RX ORDER — CITALOPRAM HYDROBROMIDE 20 MG/1
30 TABLET ORAL DAILY
Qty: 45 TABLET | Refills: 0 | Status: SHIPPED | OUTPATIENT
Start: 2022-06-30 | End: 2022-07-27

## 2022-07-08 ENCOUNTER — MYC MEDICAL ADVICE (OUTPATIENT)
Dept: FAMILY MEDICINE | Facility: CLINIC | Age: 42
End: 2022-07-08

## 2022-07-08 DIAGNOSIS — F41.9 ANXIETY DUE TO INVASIVE PROCEDURE: Primary | ICD-10-CM

## 2022-07-08 RX ORDER — LORAZEPAM 0.5 MG/1
TABLET ORAL
Qty: 2 TABLET | Refills: 0 | Status: SHIPPED | OUTPATIENT
Start: 2022-07-08 | End: 2022-08-15

## 2022-07-08 NOTE — TELEPHONE ENCOUNTER
1. Anxiety due to invasive procedure  - LORazepam (ATIVAN) 0.5 MG tablet; Take 1 tablet 60 minutes prior to procedure.  If insufficient response, take second tablet 30 minutes prior to procedure.  Dispense: 2 tablet; Refill: 0     Ofelia Mclain,

## 2022-07-10 ENCOUNTER — HOSPITAL ENCOUNTER (OUTPATIENT)
Dept: MRI IMAGING | Facility: HOSPITAL | Age: 42
Discharge: HOME | End: 2022-07-10
Attending: FAMILY MEDICINE | Admitting: FAMILY MEDICINE
Payer: COMMERCIAL

## 2022-07-10 DIAGNOSIS — Z82.49 FAMILY HISTORY OF BRAIN ANEURYSM: ICD-10-CM

## 2022-07-10 PROCEDURE — 70544 MR ANGIOGRAPHY HEAD W/O DYE: CPT

## 2022-07-12 NOTE — RESULT ENCOUNTER NOTE
Patient updated by [employer] message with MRI results.      Librado Walter,  Thanks for completing your brain scan.  This was normal.  Ofelia Mclain, DO

## 2022-07-13 ENCOUNTER — MYC REFILL (OUTPATIENT)
Dept: PSYCHIATRY | Facility: CLINIC | Age: 42
End: 2022-07-13

## 2022-07-13 DIAGNOSIS — F90.0 ADHD (ATTENTION DEFICIT HYPERACTIVITY DISORDER), INATTENTIVE TYPE: ICD-10-CM

## 2022-07-14 RX ORDER — LISDEXAMFETAMINE DIMESYLATE 50 MG/1
50 CAPSULE ORAL EVERY MORNING
Qty: 30 CAPSULE | Refills: 0 | Status: SHIPPED | OUTPATIENT
Start: 2022-07-14 | End: 2022-08-14

## 2022-07-14 ASSESSMENT — PATIENT HEALTH QUESTIONNAIRE - PHQ9
10. IF YOU CHECKED OFF ANY PROBLEMS, HOW DIFFICULT HAVE THESE PROBLEMS MADE IT FOR YOU TO DO YOUR WORK, TAKE CARE OF THINGS AT HOME, OR GET ALONG WITH OTHER PEOPLE: NOT DIFFICULT AT ALL
SUM OF ALL RESPONSES TO PHQ QUESTIONS 1-9: 3
SUM OF ALL RESPONSES TO PHQ QUESTIONS 1-9: 3

## 2022-07-14 NOTE — TELEPHONE ENCOUNTER
Last Seen 3/21  RTC 3 months  Cancel 6/20  No-Show None    Next Appt 7/15    Incoming Refill From patient via mychart    Medication Requested   lisdexamfetamine (VYVANSE) 50 MG capsule    Directions   Take 1 capsule (50 mg) by mouth every morning    Qty 30    Last Refill Per [place]  6/13 #30, 5/12 #30, 4/13 #30      Medication pended and routed to provider for approval.

## 2022-07-15 ENCOUNTER — VIRTUAL VISIT (OUTPATIENT)
Dept: PSYCHIATRY | Facility: CLINIC | Age: 42
End: 2022-07-15
Attending: PSYCHIATRY & NEUROLOGY
Payer: COMMERCIAL

## 2022-07-15 DIAGNOSIS — F90.0 ATTENTION DEFICIT HYPERACTIVITY DISORDER (ADHD), PREDOMINANTLY INATTENTIVE TYPE: Primary | ICD-10-CM

## 2022-07-15 DIAGNOSIS — F41.1 GENERALIZED ANXIETY DISORDER: ICD-10-CM

## 2022-07-15 DIAGNOSIS — F33.42 RECURRENT MAJOR DEPRESSIVE DISORDER, IN FULL REMISSION (H): ICD-10-CM

## 2022-07-15 PROCEDURE — 90792 PSYCH DIAG EVAL W/MED SRVCS: CPT | Mod: GT | Performed by: STUDENT IN AN ORGANIZED HEALTH CARE EDUCATION/TRAINING PROGRAM

## 2022-07-15 NOTE — PROGRESS NOTES
Video- Visit Details  Type of service:  video visit for diagnostic assessment  Time of service:    Video Start Time: 0810      Video End Time:  0900  Reason for video visit:  Patient convenience   Originating Site (patient location):  [facility]- [place]   Location- Patient's home  Distant Site (provider location):  [facility]  Mode of Communication:  Video Conference via [employer]       [facility]  Psychiatry Clinic  TRANSFER of CARE DIAGNOSTIC ASSESSMENT     CARE TEAM:  PCP- Ofelia Mclain    Psychotherapist- None (previously saw ADHD )  This person is a 41 year old who uses the name Saba and pronouns she, her.      DIAGNOSIS     Attention deficit hyperactivity disorder, predominantly inattentive presentation  Generalized anxiety disorder  Major depressive disorder, recurrent, full remission     ASSESSMENT   Saba is doing well overall..   Issues addressed today are below.    ADHD:  Has found prior ADHD  and current medication regiment helpful for energy/focus/productivty during the day. Also has noticeable (and seemingly abrupt) feelings of amotivation, hunger, and low energy in the afternoons/evenings. This, along with the fact that any missed days feel very challenging and unpleasant, are suggestive of her developing more physical tolerance to stimulant medication. We discussed various approaches to addressing these symptoms today including increasing vyvanse dose, adding short-acting stimulant in the afternoons, taking medication holidays, adding non-stimulant adjunct to medication regimen, and/or resuming ADHD coaching sessions. We agreed upon a combination of med holidays and adding low-dose, short acting adderall in the afternoons and combination of med holidays.     Depression:  Stable in full remission. No safety concerns. I suspect stimulant medication has been a helpful adjunct to her antidepressant in recent months.      Anxiety:  She does report having some  "anxiety though feels current level is manageable.     Future considerations  - room to increase vyvanse if continues to find helpful, concern for developing tolerance  - consider addition of nonstimulant medication such as atomoxetine or guanfacine in lieu of further increasing stimulant should patient exhibit signs of side effects or tolerance    [place]14/22 and shows filling controlled substances as prescribed.     PLAN                                                                                                                1) Meds-  - ADD amphetamine-dextroamphetamine 5 mg in the afternoons as-needed   - Continue lisdexamfetamine 50 mg daily.   - Continue citalopram 30 mg daily.    2) Psychotherapy- declined    3) Next due-  Labs- n/a  EKG- prn  Rating scales- PHQ9 and NAYELY-7 at every visit    4) Referrals-  none    5) Dispo- rtc 2 months      PERTINENT BACKGROUND                                                    [most recent eval 07/15/22]   Previous diagnoses include generalized anxiety disorder and mild major depressive disorder. Experienced symptoms of anxiety throughout her life, but noticeably worsened after birth of her firstborn. She has gotten past psychiatric care through her primary care provider and did not start any psychiatric medications until she finished nursing her youngest child around 2017. She first experienced depression at that time. Most recently, her 2nd oldest (son) was diagnosed with ADHD and responded well to treatment; noticed similar symptoms in self and pursued ADHD diagnosis which was confirmed (see 07/13/21 progress note by Dr. Janna York).      Psych pertinent item history includes mutiple psychotropic trials      SUBJECTIVE     Last seen in clinic 3/21/22, at which time she reported doing well and no medication adjustments were made.   - pursued adhd diagnosis last year after son's diagnosis, saw similar signs in self   - today \"biggest thing is lack " "of motivation\", unable to initiate tasks  - \"I know it's working because of how much I want to do my day-to-day things.\" When I'm on my medication I do my activities and feel happy.   - previously adderall helpful, didn't last as long. Would sometimes forget PM dose. Now on vyvanse, runs out of energy 3-6pm. Feels \"like I can't do anything after that point\"   - missed meds yesterday before refill was sent, states felt terrible during the day \"more depressed, couldn't do anything\"   - mood \"happy\" don't feel depressed, some anxiety is \"normal and not terrible\" worse with meds and coffee.   - previously has had down times \"all started after one of my kids were born\"   - no history \"up\" episodes (denies bo symptoms(  - no si, sa, sib.   - Wanting to get back into exercise. Setting modist weight loss goals  - has had no side effects (tics, paranoia, psychosis, dangerous appetite suppression) since last dose increase    Recent Social History: see below    Recent Psych Symptoms:   Depression:   \"really happy\", not super hungry \"been fine with me\", says less overeating.  No significant weight changes   Elevated:   no   Psychosis:  no  Anxiety:  \"normal not terrible\" amount. No panic attacks.   Sleep: good  Other: lack of motivation, especially in evenings      Recent Substance Use:     -alcohol:2 servings 3-4x per week   -cannabis: none  -tobacco:  none  -caffeine: 2x per month  -opioids: none  Narcan Kit currrent: n/a  -other: stimulant by prescription     Pertinent Negatives: No suicidal or violent ideation, psychosis, bo, aggression and harmful substance use     FAMILY and SOCIAL HISTORY                                 pt reported   Portions copy-forwarded from previous assessment. 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 House in [place]      Social/ Spiritual Support- , friends, sister, and mom      Feels Safe at Home- yes      PAST PSYCHIATRIC HISTORY     SIB- none  Suicide Attempt [#, most recent]- No   Suicidal Ideation Hx- No   Violence/Aggression Hx- No   Psychosis Hx- No   Eating Disorder Hx- No: currently setting modist weightloss goal   Other- none  Psych Hosp [#, most recent]- No   Commitment- No   TMS/ECT- No   Outpatient Programs - No      PAST MED TRIALS      Medication Max Dose (mg) Dates / Duration Helpful? DC Reason / Adverse Effects?   citalopram 30 mg current Y    adderall 20 mg Current (at 5mg) ? Initially helpful, also too short-acting for lifestyle   vyvanse  50 mg  current Y    bupropion 150 mg? Dec 2019  Worsened anxiety   sertaline 100? 150? 11/17-7/19 Y Lost efficacy over time                    7/12/21: Transfer of care diagnostic assessment.  7/22/21: Started Adderall XR 10 mg daily (recently diagnosed with ADHD, predominantly inattentive presentation).  8/05/21: Increased to Adderall XR 15 mg daily  9/08/21: Added Adderall 5 mg daily PRN and encouraged pt to connect with therapist  10/12/21: Discontinued Adderall XR 15 mg daily and Adderall 5 mg daily PRN. Started lisdexamfetamine 30 mg daily.  1/20/22: Increased lisdexamfetamine to 40 mg daily.   2/10/22: Increased lisdexamfetamine to 50 mg daily.   3/21/22: no changes  7/15/22: transfer of care diagnostic assessment, added 5mg adderall in afternoons as-needed       PAST SUBSTANCE USE HISTORY     From 7/12/21 assessment. Reviewed and udated 7/15/22    Past Use- Denies history of substance use disorders.   Treatment- #, most recent- N/A  Medical Consequences- N/A  Legal Consequences- N/A     MEDICAL HISTORY and  ALLERGY       ALLERGIES: No known drug allergies    Patient Active Problem List   Diagnosis     Allergic rhinitis     Other type of migraine     Multiple atypical nevi     Urinary incontinence in female     Mild major depression (H)     Generalized anxiety disorder     Contraception management     Metrorrhagia        MEDICAL REVIEW OF SYSTEMS   Contraception-  Yes   Pregnant- No    No aches/pain, fevers/chills, GI, headaches, history of seizures.   Periods [id number] on birth control, denies possibility of being pregnant.      MEDICATIONS     Current Outpatient Medications   Medication Sig Dispense Refill     CALCIUM PO        Cholecalciferol (VITAMIN D3 PO)        citalopram (CELEXA) 20 MG tablet Take 1.5 tablets (30 mg) by mouth daily for 30 days 45 tablet 0     Ferrous Sulfate (IRON PO)        lisdexamfetamine (VYVANSE) 50 MG capsule Take 1 capsule (50 mg) by mouth every morning 30 capsule 0     lisdexamfetamine (VYVANSE) 50 MG capsule Take 1 capsule (50 mg) by mouth every morning (Patient not taking: Reported on 3/31/2022) 30 capsule 0     lisdexamfetamine (VYVANSE) 50 MG capsule Take 1 capsule (50 mg) by mouth every morning (Patient not taking: Reported on 3/31/2022) 30 capsule 0     LORazepam (ATIVAN) 0.5 MG tablet Take 1 tablet 60 minutes prior to procedure.  If insufficient response, take second tablet 30 minutes prior to procedure. 2 tablet 0     metroNIDAZOLE (METROCREAM) 0.75 % external cream APPLY TO SCARS 1-2X DAILY       Multiple Vitamin (MULTIVITAMIN ADULT PO)        norethindrone-ethinyl estradiol (ORTHO-NOVUM) 1-35 MG-MCG tablet Take 1 tablet by mouth daily Use continuously to have menses every 3 months 112 tablet 1     Omega-3 1000 MG capsule Take 2 g by mouth       ZYRTEC 10 MG OR TABS 1 TABLET DAILY 30 prn      VITALS   There were no vitals taken for this visit.    MENTAL STATUS EXAM     Alertness: alert  and oriented  Appearance: well groomed  Behavior/Demeanor: cooperative, pleasant and calm,  "with good  eye contact   Speech: regular rate and rhythm  Language: intact  Psychomotor: normal or unremarkable  Mood: \"happy\"  Affect: full range and reactive; congruent to: mood- yes, content- yes  Thought Process/Associations: unremarkable  Thought Content:  Reports none;  Denies suicidal & violent ideation and delusions  Perception:  Reports none;  Denies auditory hallucinations, visual hallucinations and paranoia  Insight: good  Judgment: good  Cognition: adequate for interview  Gait and Station: N/A (telehealth)     LABS and DATA     PHQ 1/20/2022 3/31/2022 7/14/2022   PHQ-9 Total Score 3 2 3   Q9: Thoughts of better off dead/self-harm past 2 weeks Not at all Not at all Not at all       Recent Labs   Lab Test 12/09/21  0925 07/19/18  1120   CR 0.81 0.73   GFRESTIMATED 90 >60     Recent Labs   Lab Test 07/19/18  1120   AST 20   ALT 14   ALKPHOS 58     ECG 12/9/21 QTc = 414 ms     PSYCHOTROPIC DRUG INTERACTIONS                                                       PSYCHCLINICDDI   Per [employer]      MANAGEMENT:  Monitoring for adverse effects and patient is aware of risks     RISK STATEMENT for SAFETY     Saba did not appear to be an imminent safety risk to self or others.    TREATMENT RISK STATEMENT: The risks, benefits, alternatives and potential adverse effects have been discussed and are understood by the pt. The pt understands the risks of using street drugs or alcohol. There are no medical contraindications, the pt agrees to treatment with the ability to do so. The pt knows to call the clinic for any problems or to access emergency care if needed.  Medical and substance use concerns are documented above.  Psychotropic drug interaction check was done, including changes made today.     PROVIDER: Carolann Antony MD    Patient staffed in clinic with Dr. Dallas who will sign the note.  Supervisor is Dr. Greene.      Answers for HPI/ROS submitted by the patient on 7/14/2022  If you checked off any problems, " how difficult have these problems made it for you to do your work, take care of things at home, or get along with other people?: Not difficult at all  PHQ9 TOTAL SCORE: 3      Answers for HPI/ROS submitted by the patient on 7/14/2022  If you checked off any problems, how difficult have these problems made it for you to do your work, take care of things at home, or get along with other people?: Not difficult at all  PHQ9 TOTAL SCORE: 3

## 2022-07-15 NOTE — PATIENT INSTRUCTIONS
START taking adderall 5mg in the afternoons as-needed.   Continue all other medications  Return to clinic in 2 months     **For crisis resources, please see the information at the end of this document**   Patient Education    Thank you for coming to the [facility] [place] [facility].     Lab Testing:  If you had lab testing today and your results are reassuring or normal they will be mailed to you or sent through [facility] within 7 days. If the lab tests need quick action we will call you with the results. The phone number we will call with results is # [phone]. If this is not the best number please call our clinic and change the number.     Medication Refills:  If you need any refills please call your pharmacy and they will contact us. Our fax number for refills is [phone].   Three business days of notice are needed for general medication refill requests.   Five business days of notice are needed for controlled substance refill requests.   If you need to change to a different pharmacy, please contact the new pharmacy directly. The new pharmacy will help you get your medications transferred.     Contact Us:  Please call [phone] during business hours (8-5:00 M-F).   If you have medication related questions after clinic hours, or on the weekend, please call [phone].     Financial Assistance [phone]   Medical Records [phone]       MENTAL HEALTH CRISIS RESOURCES:  For a emergency help, please call 911 or go to the nearest Emergency Department.     Emergency Walk-In Options:   EmPATH Unit @ [facility] ([place]): [phone] - Specialized mental health emergency area designed to be calming  [facility] ([place]): [phone]  [facility] ([place]): [phone]  [facility]): [phone]    [facility] Crisis Information:   [place]: [phone]  [place]: [phone]  [place] (JOE) - Adult:  [phone]     Child: [phone]  [place] - Adult: [phone]     Child: [phone]  [place]: [phone]  List of all [facility] resources:   https://[place].gov/dhs/people-we-serve/adults/health-care/mental-health/resources/crisis-contacts.jsp    National Crisis Information:   Crisis Text Line: Text  [place]  to 661894  National Suicide Prevention Lifeline: 5-834-322-TALK (1-848.939.6640)       For online chat options, visit https://suicidepreventionlifeline.org/chat/  Poison Control Center: 4-061-005-3992  Trans Lifeline: 0-827-840-4624 - Hotline for transgender people of all ages  The [employer] Project: 8-680-412-3455 - Hotline for LGBT youth     For Non-Emergency Support:   Fast Tracker: Mental Health & Substance Use Disorder Resources -   https://www.[employer].org/

## 2022-07-15 NOTE — PROGRESS NOTES
Angelic Marrero is a 41 year old who has consented to receive services via billable video visit.      Pt will join video visit via: [employer]  If there are problems joining the visit, send backup video invite via: Text to preferred phone: [phone]      Originating Location (patient location): Patient's home  Distant Location (provider location): [facility] [place] [facility]    Will anyone else be joining the video visit? No    How would you prefer to obtain AVS?: Andry Mueller VF

## 2022-07-15 NOTE — Clinical Note
Cant recall if I sent this yesterday. Re-sending today. The patient and I settled on adding an afternoon dose of adderall 5mg (has been on before, tolerated well). She might also add in some med holidays on weekends.

## 2022-07-18 RX ORDER — DEXTROAMPHETAMINE SACCHARATE, AMPHETAMINE ASPARTATE, DEXTROAMPHETAMINE SULFATE AND AMPHETAMINE SULFATE 1.25; 1.25; 1.25; 1.25 MG/1; MG/1; MG/1; MG/1
5 TABLET ORAL DAILY PRN
Qty: 30 TABLET | Refills: 0 | Status: SHIPPED | OUTPATIENT
Start: 2022-07-18 | End: 2023-01-17

## 2022-07-24 DIAGNOSIS — F41.1 GENERALIZED ANXIETY DISORDER: ICD-10-CM

## 2022-07-27 RX ORDER — CITALOPRAM HYDROBROMIDE 20 MG/1
30 TABLET ORAL DAILY
Qty: 45 TABLET | Refills: 0 | Status: SHIPPED | OUTPATIENT
Start: 2022-07-27 | End: 2022-08-31

## 2022-07-27 NOTE — TELEPHONE ENCOUNTER
citalopram (CELEXA) 20 MG   Last refilled: 6/30/22  Qty: 45    Last seen: 7/15/22  RTC: 2 MOS  Cancel: 0  No-show: 0  Next appt: 9/23/22  Refill pended and routed to the provider for review/determination due to :  Last note Unsigned

## 2022-07-31 DIAGNOSIS — N92.1 METRORRHAGIA: ICD-10-CM

## 2022-07-31 DIAGNOSIS — Z30.41 ENCOUNTER FOR SURVEILLANCE OF CONTRACEPTIVE PILLS: ICD-10-CM

## 2022-07-31 RX ORDER — NORETHINDRONE AND ETHINYL ESTRADIOL 1 MG-35MCG
KIT ORAL
Qty: 112 TABLET | Refills: 1 | Status: SHIPPED | OUTPATIENT
Start: 2022-07-31 | End: 2023-01-15

## 2022-08-01 NOTE — TELEPHONE ENCOUNTER
"Last Written Prescription Date:  2/16/22  Last Fill Quantity: 112,  # refills: 1   Last office visit provider:  3/31/22     Requested Prescriptions   Pending Prescriptions Disp Refills     ALAYCEN 1/35 1-35 MG-MCG tablet [Pharmacy Med Name: ALYACEN 1-35 28 TABLET] 112 tablet 1     Sig: TAKE 1 TABLET BY MOUTH DAILY USE CONTINUOUSLY TO HAVE MENSES EVERY 3 MONTHS       Contraceptives Protocol Passed - 7/31/2022 12:19 AM        Passed - Patient is not a current smoker if age is 35 or older        Passed - Recent (12 mo) or future (30 days) visit within the authorizing provider's specialty     Patient has had an office visit with the authorizing provider or a provider within the authorizing providers department within the previous 12 mos or has a future within next 30 days. See \"Patient Info\" tab in inbasket, or \"Choose Columns\" in Meds & Orders section of the refill encounter.              Passed - Medication is active on med list        Passed - No active pregnancy on record        Passed - No positive pregnancy test in past 12 months       Hormone Replacement Therapy Passed - 7/31/2022 12:19 AM        Passed - Blood pressure under 140/90 in past 12 months     BP Readings from Last 3 Encounters:   03/31/22 110/70   01/14/22 120/77   12/09/21 109/65                 Passed - Recent (12 mo) or future (30 days) visit within the authorizing provider's specialty     Patient has had an office visit with the authorizing provider or a provider within the authorizing providers department within the previous 12 mos or has a future within next 30 days. See \"Patient Info\" tab in inbasket, or \"Choose Columns\" in Meds & Orders section of the refill encounter.              Passed - Medication is active on med list        Passed - Patient is 18 years of age or older        Passed - No active pregnancy on record        Passed - No positive pregnancy test on record in past 12 months             Martha Linda RN 07/31/22 9:40 PM  "

## 2022-08-14 ENCOUNTER — MYC REFILL (OUTPATIENT)
Dept: PSYCHIATRY | Facility: CLINIC | Age: 42
End: 2022-08-14

## 2022-08-14 DIAGNOSIS — F90.0 ADHD (ATTENTION DEFICIT HYPERACTIVITY DISORDER), INATTENTIVE TYPE: ICD-10-CM

## 2022-08-15 RX ORDER — LISDEXAMFETAMINE DIMESYLATE 50 MG/1
50 CAPSULE ORAL EVERY MORNING
Qty: 30 CAPSULE | Refills: 0 | Status: SHIPPED | OUTPATIENT
Start: 2022-08-15 | End: 2022-09-11

## 2022-08-15 NOTE — TELEPHONE ENCOUNTER
Last seen: 07/15/2022  RTC: 2 months   Cancel: None  No-show: None  Next appt: 09/23/2022     Incoming refill from Patient via [employer]    Medication requested:   Pending Prescriptions:                       Disp   Refills    lisdexamfetamine (VYVANSE) 50 MG capsule  30 cap*0            Sig: Take 1 capsule (50 mg) by mouth every morning      Last refill per       From chart note:   - Continue lisdexamfetamine 50 mg daily.       Medication sent to provider for review: controlled substance, last visit note unsigned

## 2022-08-31 DIAGNOSIS — F41.1 GENERALIZED ANXIETY DISORDER: ICD-10-CM

## 2022-08-31 RX ORDER — CITALOPRAM HYDROBROMIDE 20 MG/1
30 TABLET ORAL DAILY
Qty: 45 TABLET | Refills: 0 | Status: SHIPPED | OUTPATIENT
Start: 2022-08-31 | End: 2022-09-27

## 2022-08-31 NOTE — TELEPHONE ENCOUNTER
Last seen: 7/15/22  RTC: 2 MOS  Cancel: 0  No-show: 0  Next appt: 9/23/22     Incoming refill from Pharmacy via fax     Medication requested:   Pending Prescriptions:                       Disp   Refills    citalopram (CELEXA) 20 MG tablet          45 tab*0            Sig: Take 1.5 tablets (30 mg) by mouth daily    Last note unsigned     Medication sent to provider for review.

## 2022-09-11 ENCOUNTER — MYC REFILL (OUTPATIENT)
Dept: PSYCHIATRY | Facility: CLINIC | Age: 42
End: 2022-09-11

## 2022-09-11 DIAGNOSIS — F90.0 ADHD (ATTENTION DEFICIT HYPERACTIVITY DISORDER), INATTENTIVE TYPE: ICD-10-CM

## 2022-09-12 RX ORDER — LISDEXAMFETAMINE DIMESYLATE 50 MG/1
50 CAPSULE ORAL EVERY MORNING
Qty: 30 CAPSULE | Refills: 0 | Status: SHIPPED | OUTPATIENT
Start: 2022-09-12 | End: 2022-09-23

## 2022-09-12 NOTE — TELEPHONE ENCOUNTER
Last Seen 7/15/22  RTC 2 months  Cancel 0  No-Show 0    Next Appt 9/23/22    Incoming Refill From patient request via MyChart    Medication Requested   lisdexamfetamine (VYVANSE) 50 MG capsule    Directions   Route: Take 1 capsule (50 mg) by mouth every morning - Oral    Qty 30    Last Refill 8/15/22    Medication Refill Pended Per Refill Protocol

## 2022-09-23 ENCOUNTER — VIRTUAL VISIT (OUTPATIENT)
Dept: PSYCHIATRY | Facility: CLINIC | Age: 42
End: 2022-09-23
Attending: PSYCHIATRY & NEUROLOGY
Payer: COMMERCIAL

## 2022-09-23 DIAGNOSIS — F90.0 ATTENTION DEFICIT HYPERACTIVITY DISORDER (ADHD), PREDOMINANTLY INATTENTIVE TYPE: Primary | ICD-10-CM

## 2022-09-23 DIAGNOSIS — F41.1 GENERALIZED ANXIETY DISORDER: ICD-10-CM

## 2022-09-23 PROCEDURE — 99213 OFFICE O/P EST LOW 20 MIN: CPT | Mod: GT | Performed by: STUDENT IN AN ORGANIZED HEALTH CARE EDUCATION/TRAINING PROGRAM

## 2022-09-23 RX ORDER — LISDEXAMFETAMINE DIMESYLATE 50 MG/1
50 CAPSULE ORAL EVERY MORNING
Qty: 30 CAPSULE | Refills: 0 | Status: SHIPPED | OUTPATIENT
Start: 2022-10-12 | End: 2022-12-20

## 2022-09-23 RX ORDER — LISDEXAMFETAMINE DIMESYLATE 50 MG/1
50 CAPSULE ORAL EVERY MORNING
Qty: 30 CAPSULE | Refills: 0 | Status: SHIPPED | OUTPATIENT
Start: 2022-11-11 | End: 2022-12-20

## 2022-09-23 RX ORDER — LISDEXAMFETAMINE DIMESYLATE 50 MG/1
50 CAPSULE ORAL EVERY MORNING
Qty: 30 CAPSULE | Refills: 0 | Status: SHIPPED | OUTPATIENT
Start: 2022-12-09 | End: 2023-01-13

## 2022-09-23 ASSESSMENT — PATIENT HEALTH QUESTIONNAIRE - PHQ9
SUM OF ALL RESPONSES TO PHQ QUESTIONS 1-9: 1
10. IF YOU CHECKED OFF ANY PROBLEMS, HOW DIFFICULT HAVE THESE PROBLEMS MADE IT FOR YOU TO DO YOUR WORK, TAKE CARE OF THINGS AT HOME, OR GET ALONG WITH OTHER PEOPLE: NOT DIFFICULT AT ALL
SUM OF ALL RESPONSES TO PHQ QUESTIONS 1-9: 1

## 2022-09-23 NOTE — Clinical Note
Meds.  I know Johnny is on this morning but this patient didn't need staffing and you have met them before at their last appt, and since are on this afternoon I thought I'd route to you. She is doing well. No changes today. Sending in 3 months of refills with them each future-dated because we can't do a 90-day supply, right?

## 2022-09-23 NOTE — PATIENT INSTRUCTIONS
It was good seeing you today. Keep taking meds as prescribed. We will send prescriptions to your pharmacy to cover the next 3 months.     We talked about some lifestyle changes you can add to your routine for mental health. Consider using a light box in the winter months. [facility] has info on how best to use this. https://www.[facility].org/diseases-conditions/seasonal-affective-disorder/in-depth/seasonal-affective-disorder-treatment/art-68203376    You can try intermittent fasting for weight loss & ADHD symptoms. There is less-research in this area than there is for light boxes or the medications you take, but it may be worth experimenting with and seeing how it works for you. A typical fast might be 8/16 (8 hours of eating, 16 hours of fasting).     Also, keep up the exercise routine.     Carolann Cesar MD     **For crisis resources, please see the information at the end of this document**   Patient Education    Thank you for coming to the [facility] MENTAL HEALTH & ADDI[facility] [place] CL[facility].     Lab Testing:  If you had lab testing today and your results are reassuring or normal they will be mailed to you or sent through [employer] within 7 days. If the lab tests need quick action we will call you with the results. The phone number we will call with results is # [phone]. If this is not the best number please call our clinic and change the number.     Medication Refills:  If you need any refills please call your pharmacy and they will contact us. Our fax number for refills is [phone].   Three business days of notice are needed for general medication refill requests.   Five business days of notice are needed for controlled substance refill requests.   If you need to change to a different pharmacy, please contact the new pharmacy directly. The new pharmacy will help you get your medications transferred.     Contact Us:  Please call [phone] during business hours (8-5:00 M-F).   If you  have medication related questions after clinic hours, or on the weekend, please call [phone].     Financial Assistance [phone]   Medical Records [phone]       MENTAL HEALTH CRISIS RESOURCES:  For a emergency help, please call 551 or go to the nearest Emergency Department.     Emergency Walk-In Options:   EmPATH Unit @ [facility] ([place]): [phone] - Specialized mental health emergency area designed to be calming  [facility] ([place]): [phone]  [facility] ([place]): [phone]  [facility] ([place]): [phone]    [facility] Crisis Information:   [place]: [phone]  [place]: [phone]  [place] (JOE) - Adult: [phone]     Child: [phone]  [place] - Adult: [phone]     Child: [phone]  [place]: [phone]  List of all [facility] resources:   https://[place].gov/dhs/people-we-serve/adults/health-care/mental-health/resources/crisis-contacts.jsp    National Crisis Information:   Crisis Text Line: Text  [place]  to 046440  Suicide & Crisis Lifeline: 988  National Suicide Prevention Lifeline: 1-188-386-TALK (1-150.421.6480)       For online chat options, visit https://suicidepreventionlifeline.org/chat/  Poison Control Center: 1-559.511.5529  Trans Lifeline: 1-510.494.3436 - Hotline for transgender people of all ages  The [employer] Project: 8-384-663-5910 - Hotline for LGBT youth     For Non-Emergency Support:   Fast Tracker: Mental Health & Substance Use Disorder Resources -   https://www.[employer].org/

## 2022-09-23 NOTE — PROGRESS NOTES
Angelic Marrero is a 42 year old who has consented to receive services via billable video visit.      Pt will join video visit via: [employer]  If there are problems joining the visit, send backup video invite via: Text to preferred phone: [phone]      Originating Location (patient location): Patient's home  Distant Location (provider location): [facility]    Will anyone else be joining the video visit? No

## 2022-09-23 NOTE — PROGRESS NOTES
Video- Visit Details  Type of service:  video visit for medication management  Time of service:    Video Start Time: 0943   Video End Time:  1008  Reason for video visit:  Patient convenience   Originating Site (patient location):  [facility] [place]   Location- Patient's home  Distant Site (provider location):  [facility]  Mode of Communication:  Video Conference via [employer]       [facility]  Psychiatry Clinic  MEDICAL PROGRESS NOTE     CARE TEAM:  PCP- Ofelia Mclain    Psychotherapist- None (previously saw ADHD )  This person is a 42 year old who uses the name Saba and pronouns she, her.      DIAGNOSIS     Attention deficit hyperactivity disorder, predominantly inattentive presentation  Generalized anxiety disorder  Major depressive disorder, recurrent, full remission     ASSESSMENT     Saba reports doing well overall. She continues to report mood and ADHD symptoms as well-controlled, in-fact improved since school year started. Does have some anxiety at baseline which she feels she manages well with coping skills. Also has some situational stressors though finds exercise and talking to family helpful in managing this stress.     Taking medications consistently and denies any side effects from these. She expresses desire to continue current medications which I am in agreement with. No medication changes made today. We did discuss multiple lifestyle approaches to manage mental health symptoms including light box utilization for seasonal mood changes (not occurring currently though has felt this way in the past) as well as intermittent fasting for weight loss and ADHD symptoms. This information was added to her AVS.     No other questions nor concerns today.     Future considerations  - room to increase vyvanse if continues to find helpful, concern for developing tolerance  - consider addition of nonstimulant medication such as atomoxetine or guanfacine in lieu of further  "increasing stimulant should patient exhibit signs of side effects or tolerance    [place]14/22 and shows filling controlled substances as prescribed.     PLAN                                                                                                                1) Meds-   - Continue lisdexamfetamine 50 mg daily  - Continue amphetamine-dextroamphetamine 5 mg in the afternoons as-needed   - Continue citalopram 30 mg daily    Has stopped taking melatonin     2) Psychotherapy- none. Declined at 7/15/22 appt    3) Next due-  Labs- n/a  EKG- prn  Rating scales- PHQ9 and NAYELY-7 at every visit    4) Referrals-  none    5) Dispo- rtc 3 months      PERTINENT BACKGROUND                                                    [most recent eval 07/15/22]   Previous diagnoses include generalized anxiety disorder and mild major depressive disorder. Experienced symptoms of anxiety throughout her life, but noticeably worsened after birth of her firstborn. She has gotten past psychiatric care through her primary care provider and did not start any psychiatric medications until she finished nursing her youngest child around 2017. She first experienced depression at that time. Most recently, her 2nd oldest (son) was diagnosed with ADHD and responded well to treatment; noticed similar symptoms in self and pursued ADHD diagnosis which was confirmed (see 07/13/21 progress note by Dr. Janna York).      Psych pertinent item history includes mutiple psychotropic trials      SUBJECTIVE     Last seen in clinic 2 months ago, at which time reported good mood, improved concentration. Also noted decreased efficacy of stimulant in the afternoons as well as withdrawal symptoms when missing doses. 5mg adderall added in the PM prn and education provided regarding med holidays. Since last being seen, they report:    Mood: \"doing well\" and \"actually I was thinking about if I was going to ask for anything different or any changes " "but everything seems good.\" States hasn't used afternoon prn medications much \"not more than a handfull of times geovanny I don't want to be taking it too late. I don't want to be up all night.\" Thinks mood has been better since school year started; doing more projects at home, working out more.     Regarding anxiety -- \"I think I always have a little bit of anxiety at baseline. It's manageable.\" No changes since kids back to school.     In the winter will notice \"things are harder.\" Motivation worse, wants to be at home more \"definitely susceptible to depression.\" Denies any depression currently.    Social update:  kids back in school, getting a lot of projects done around the house (renovations, selling furniture, meal planning).     Sleep: \"great\" typically 8 hours a night, no nightmares.     Stress: some arguments with spouse. Finds talking to her mom and sister helpful. Also finds working out to be good stress relief.     Health: no changes, no headaches, no pain, no GI symptoms.     Starting working out at [employer].    Diet \"obsessed with carbs\" no weight loss over 6 months. Does eat breakfast most days.     Substance use: no changes    Meds:  Finds these helpful. Takes consistently, when rarely misses will notice \"feeling more hungry and less motivated\" without withdrawal symptoms \"no bad crashing or anything.\" Has not been doing med holidays.     Denies tics, sleep changes, significant appetite suppression.      SI: \"no real bad days\"      FAMILY and SOCIAL HISTORY                                 pt reported   Portions copy-forwarded from previous assessment. 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 House in [place]      Social/ Spiritual Support- , friends, sister, and mom      Feels Safe at Home- yes      PAST MED TRIALS      Medication Max Dose (mg) Dates / Duration Helpful? DC Reason / Adverse Effects?   citalopram 30 mg current Y    adderall 20 mg Current (at 5mg) ? Initially helpful, also too short-acting for lifestyle   vyvanse  50 mg  current Y    bupropion 150 mg? Dec 2019  Worsened anxiety   sertaline 100? 150? 11/17-7/19 Y Lost efficacy over time                    7/12/21: Transfer of care diagnostic assessment.  7/22/21: Started Adderall XR 10 mg daily (recently diagnosed with ADHD, predominantly inattentive presentation).  8/05/21: Increased to Adderall XR 15 mg daily  9/08/21: Added Adderall 5 mg daily PRN and encouraged pt to connect with therapist  10/12/21: Discontinued Adderall XR 15 mg daily and Adderall 5 mg daily PRN. Started lisdexamfetamine 30 mg daily.  1/20/22: Increased lisdexamfetamine to 40 mg daily.   2/10/22: Increased lisdexamfetamine to 50 mg daily.   3/21/22: no changes  7/15/22: transfer of care diagnostic assessment, added 5mg adderall in afternoons as-needed    09/23/22: no changes     MEDICAL HISTORY and ALLERGY       ALLERGIES: Patient has no known allergies.    Patient Active Problem List   Diagnosis     Allergic rhinitis     Other type of migraine     Multiple atypical nevi     Urinary incontinence in female     Mild major depression (H)     Generalized anxiety disorder     Contraception management     Metrorrhagia        MEDICAL REVIEW OF SYSTEMS   Contraception-  Yes   Pregnant- No    No aches/pain, fevers/chills, GI, headaches, history of seizures.   Periods [id number] on birth control, denies possibility of being pregnant.      MEDICATIONS     Current Outpatient Medications   Medication Sig Dispense Refill     ALAYCEN 1/35 1-35 MG-MCG  "tablet TAKE 1 TABLET BY MOUTH DAILY USE CONTINUOUSLY TO HAVE MENSES EVERY 3 MONTHS 112 tablet 1     amphetamine-dextroamphetamine (ADDERALL) 5 MG tablet Take 1 tablet (5 mg) by mouth daily as needed (adhd) 30 tablet 0     CALCIUM PO        Cholecalciferol (VITAMIN D3 PO)        citalopram (CELEXA) 20 MG tablet Take 1.5 tablets (30 mg) by mouth daily 45 tablet 0     Ferrous Sulfate (IRON PO)        lisdexamfetamine (VYVANSE) 50 MG capsule Take 1 capsule (50 mg) by mouth every morning 30 capsule 0     Multiple Vitamin (MULTIVITAMIN ADULT PO)        Omega-3 1000 MG capsule Take 2 g by mouth       ZYRTEC 10 MG OR TABS 1 TABLET DAILY 30 prn      VITALS   There were no vitals taken for this visit.    MENTAL STATUS EXAM     Alertness: alert  and oriented  Appearance: well groomed  Behavior/Demeanor: cooperative, pleasant and calm, with good  eye contact   Speech: regular rate and rhythm  Language: intact  Psychomotor: normal or unremarkable  Mood: \"good\"  Affect: full range and reactive; congruent to: mood- yes, content- yes  Thought Process/Associations: unremarkable  Thought Content:  Reports none;  Denies suicidal & violent ideation and delusions  Perception:  Reports none;  Denies auditory hallucinations, visual hallucinations and paranoia  Insight: good  Judgment: good  Cognition: adequate for interview  [facility]     LABS and DATA     PHQ 1/20/2022 3/31/2022 7/14/2022   PHQ-9 Total Score 3 2 3   Q9: Thoughts of better off dead/self-harm past 2 weeks Not at all Not at all Not at all       Recent Labs   Lab Test 12/09/21  0925 07/19/18  1120   CR 0.81 0.73   GFRESTIMATED 90 >60     Recent Labs   Lab Test 07/19/18  1120   AST 20   ALT 14   ALKPHOS 58     ECG 12/9/21 QTc = 414 ms     PSYCHOTROPIC DRUG INTERACTIONS                                                       PSYCHCLINICDDI   Per [employer]      MANAGEMENT:  Monitoring for adverse effects and patient is aware of risks     RISK STATEMENT " for SAFETY     Saba did not appear to be an imminent safety risk to self or others.    TREATMENT RISK STATEMENT: The risks, benefits, alternatives and potential adverse effects have been discussed and are understood by the pt. The pt understands the risks of using street drugs or alcohol. There are no medical contraindications, the pt agrees to treatment with the ability to do so. The pt knows to call the clinic for any problems or to access emergency care if needed.  Medical and substance use concerns are documented above.  Psychotropic drug interaction check was done, including changes made today.     PROVIDER: Carolann Antony MD    Patient not staffed in clinic.  Note will be reviewed and signed by supervisor Dr. Greene.

## 2022-09-27 RX ORDER — CITALOPRAM HYDROBROMIDE 20 MG/1
30 TABLET ORAL DAILY
Qty: 45 TABLET | Refills: 2 | Status: SHIPPED | OUTPATIENT
Start: 2022-09-27 | End: 2023-01-17

## 2022-11-02 ENCOUNTER — TELEPHONE (OUTPATIENT)
Dept: PSYCHIATRY | Facility: CLINIC | Age: 42
End: 2022-11-02

## 2022-11-02 NOTE — TELEPHONE ENCOUNTER
Vaccine administration information was received from [facility].   1.  Vaccine: COVID-19  2.  Date received: 10/30/22  3.  Dose: 0.30 mL  4.  Route: IM  5.  Location: LD  6.  : Pfizer  7.  Lot #: [id number]  8.  Exp: 6/30/23  This was sent to scanning with copy held until scanning confirmed.    Vaccine administration information was received from [facility].   1.  Vaccine: Influenza  2.  Date received: 10/30/22  3.  Dose: 0.50 mL  4.  Route: IM  5.  Location: RD  6.  : [employer]  7.  Lot #: [id number]  8.  Exp: 6/30/23  This was sent to scanning with copy held until scanning confirmed.

## 2022-11-21 ENCOUNTER — HEALTH MAINTENANCE LETTER (OUTPATIENT)
Age: 42
End: 2022-11-21

## 2022-11-30 ENCOUNTER — TRANSFERRED RECORDS (OUTPATIENT)
Dept: HEALTH INFORMATION MANAGEMENT | Facility: CLINIC | Age: 42
End: 2022-11-30

## 2022-12-20 ENCOUNTER — VIRTUAL VISIT (OUTPATIENT)
Dept: PSYCHIATRY | Facility: CLINIC | Age: 42
End: 2022-12-20
Attending: PSYCHIATRY & NEUROLOGY
Payer: COMMERCIAL

## 2022-12-20 DIAGNOSIS — F41.1 GENERALIZED ANXIETY DISORDER: ICD-10-CM

## 2022-12-20 DIAGNOSIS — F90.0 ATTENTION DEFICIT HYPERACTIVITY DISORDER (ADHD), PREDOMINANTLY INATTENTIVE TYPE: Primary | ICD-10-CM

## 2022-12-20 DIAGNOSIS — F32.9 MAJOR DEPRESSIVE DISORDER, REMISSION STATUS UNSPECIFIED, UNSPECIFIED WHETHER RECURRENT: ICD-10-CM

## 2022-12-20 PROCEDURE — 99214 OFFICE O/P EST MOD 30 MIN: CPT | Mod: HN | Performed by: STUDENT IN AN ORGANIZED HEALTH CARE EDUCATION/TRAINING PROGRAM

## 2022-12-20 RX ORDER — GUANFACINE 1 MG/1
1 TABLET, EXTENDED RELEASE ORAL AT BEDTIME
Qty: 30 TABLET | Refills: 1 | Status: SHIPPED | OUTPATIENT
Start: 2022-12-20 | End: 2023-01-17 | Stop reason: SINTOL

## 2022-12-20 ASSESSMENT — PATIENT HEALTH QUESTIONNAIRE - PHQ9
SUM OF ALL RESPONSES TO PHQ QUESTIONS 1-9: 2
SUM OF ALL RESPONSES TO PHQ QUESTIONS 1-9: 2
10. IF YOU CHECKED OFF ANY PROBLEMS, HOW DIFFICULT HAVE THESE PROBLEMS MADE IT FOR YOU TO DO YOUR WORK, TAKE CARE OF THINGS AT HOME, OR GET ALONG WITH OTHER PEOPLE: NOT DIFFICULT AT ALL

## 2022-12-20 NOTE — PATIENT INSTRUCTIONS
"It was good seeing you today, Saba. I sent the new med in to your pharmacy. It may take a few weeks to reach full effect.     You can also check out \"ADDitudes\" magazine online for other non-pharmacologic ways to manage symptoms. There sections about diet, supplements, skills etc for adults with ADHD. Also some webinars and lectures that other patient have told me are very informative and helpful.     We'll check-in again on the 17th.     Have a very Olive [place]a. Best,  Dr Carolann Antony     **For crisis resources, please see the information at the end of this document**   Patient Education    Thank you for coming to the [facility].     Lab Testing:  If you had lab testing today and your results are reassuring or normal they will be mailed to you or sent through [employer] within 7 days. If the lab tests need quick action we will call you with the results. The phone number we will call with results is # [phone]. If this is not the best number please call our clinic and change the number.     Medication Refills:  If you need any refills please call your pharmacy and they will contact us. Our fax number for refills is [phone].   Three business days of notice are needed for general medication refill requests.   Five business days of notice are needed for controlled substance refill requests.   If you need to change to a different pharmacy, please contact the new pharmacy directly. The new pharmacy will help you get your medications transferred.     Contact Us:  Please call [phone] during business hours (8-5:00 M-F).   If you have medication related questions after clinic hours, or on the weekend, please call [phone].     Financial Assistance [phone]   Medical Records [phone]       MENTAL HEALTH CRISIS RESOURCES:  For a emergency help, please call 911 or go to the nearest Emergency Department.     Emergency Walk-In Options:   EmPATH Unit @ " [facility] ([place]): [phone] - Specialized mental health emergency area designed to be calming  [facility] ([place]): [phone]  [facility] ([place]): [phone]  [facility] ([place]): [phone]    [facility] Crisis Information:   [place]: [phone]  [place]: [phone]  [place] ([place]) - Adult: [phone]     Child: [phone]  [place] - Adult: [phone]     Child: [phone]  [place]: [phone]  List of all [facility] resources:   https://[place].[facility]/dhs/people-we-serve/adults/health-care/mental-health/resources/crisis-contacts.jsp    National Crisis Information:   Crisis Text Line: Text  [place]  to 203175  Suicide & Crisis Lifeline: 988  National Suicide Prevention Lifeline: 5-161-299-TALK (1-489.477.6809)       For online chat options, visit https://suicidepreventionlifeline.org/chat/  Poison Control Center: 1-748.875.6610  Trans Lifeline: 1-938.327.1951 - Hotline for transgender people of all ages  The [employer] Project: 5-419-406-4332 - Hotline for LGBT youth     For Non-Emergency Support:   Fast Tracker: Mental Health & Substance Use Disorder Resources -   https://www.Neogrowthn.org/

## 2022-12-20 NOTE — PROGRESS NOTES
Video- Visit Details  Type of service:  video visit for medication management  Time of service:    Video Start Time: 0918   Video End Time:  1038  Reason for video visit:  Patient convenience   Originating Site (patient location):  [facility] [place]- [place]   Location- Patient's home  Distant Site (provider location):  [facility]  Mode of Communication:  Video Conference via [employer]       [facility]  Psychiatry Clinic  MEDICAL PROGRESS NOTE     CARE TEAM:  PCP- Ofelia Mclain    Psychotherapist- None (previously saw ADHD )  This person is a 42 year old who uses the name Saba and pronouns she, her.      DIAGNOSIS     Attention deficit hyperactivity disorder, predominantly inattentive presentation  Generalized anxiety disorder  Major depressive disorder, recurrent, full remission     ASSESSMENT     Angelic Marrero is a 42 year old female who carries diagnoses of ADHD, NAYELY, and MDD who reports doing well overall. States mood, sleep, and physical health are doing well. ADHD symptoms mostly controlled though worse concentration & inattention in the afternoons, also frequently forgets afternoon prn medication.     We discussed risks/benefits/alternatives to multiple treatment options today including no changes, decreasing her antidepressant, and adding guanfacine XR. As mood has been stable >1 year on current antidepressant, reasonable to trial reduction or discontinuation of this at this time, which the patient expressed interest in.    We did agree today, however, to add low-dose extended-release guanfacine to her regiment to target ADHD symptoms. Will avoid making multiple changes at once and will reassess at follow-up in 1 month.     I also provided verbal education on vitamin D today, which she plans to continue taking. Also provided [employer] resource in [employer] message for her reference.       Future considerations  - room to increase vyvanse if continues to find helpful,  "concern for developing tolerance  - consider addition of nonstimulant medication such as atomoxetine or guanfacine in lieu of further increasing stimulant should patient exhibit signs of side effects or tolerance  - avoid bupropion, previously did not tolerate, worsened anxiety   - consider decreasing / discontinuing antidepressant as tolerated given long period of stability     [place] 7[place]14/22 and shows filling controlled substances as prescribed.     PLAN                                                                                                                1) Meds-   - ADD guanfacine XR 1 mg at bedtime   - Continue lisdexamfetamine 50 mg daily  - Continue amphetamine-dextroamphetamine 5 mg in the afternoons as-needed, rarely takes   - Continue citalopram 30 mg daily     OTC - Melatonin prn,  womens multivitamin, calcium supplements, \"happy\" vitamin with vitamin D and saffron, iron supplement, zyrtec, fish oil, Alacen       2) Psychotherapy- none. Declined at 7/15/22 appt    3) Next due-  Labs- n/a  EKG- prn  Rating scales- PHQ9 and NAYELY-7 at every visit    4) Referrals-  none    5) Dispo- rtc 3 months      PERTINENT BACKGROUND                                                    [most recent eval 07/15/22]   Previous diagnoses include generalized anxiety disorder and mild major depressive disorder. Experienced symptoms of anxiety throughout her life, but noticeably worsened after birth of her firstborn. She has gotten past psychiatric care through her primary care provider and did not start any psychiatric medications until she finished nursing her youngest child around 2017. She first experienced depression at that time. Most recently, her 2nd oldest (son) was diagnosed with ADHD and responded well to treatment; noticed similar symptoms in self and pursued ADHD diagnosis which was confirmed (see 07/13/21 progress note by Dr. Janna York).      Psych pertinent item history includes mutiple " "psychotropic trials      SUBJECTIVE   Last seen in clinic 3 months ago, at which time reported stable mood and concentration symptoms, no med changes made. Since last being seen, they report:    Mood:   \"good overall really good.\" Enjoys [employer], gets stressed-out \"but I love [employer] music and decorating.\"     Feeling \"Good I don't feel depressed. I feel happy I feel hopeful. I love my life each day. My  and I have been arguing lately (stressed about money, might get a job) for now we put that on hold.\"     ADHD symptoms \"good. Like I feel like doing stuff. I get stuff done. I forget to take the afternoon dose that you prescribed to me until it's too late.\" States in afternoons feels less focus, less able to initiate tasks.     Social update:   Parents in-town for the holiday. Kids off from school 22-Jan 3rd.     Sleep:   \"sleep really well\" and \"melatonin works really well for me, but I'm trying not take it all the time. It really messes up my dreams.\"    Stress: holidays    Health:   Denies fevers, chills, pains, nausea, vomiting  \"little bit of constipation when I don't drink enough water.\"     Substance use:   No caffeine   No tobacco use  Alochol \"really cut back, once or twice a week I'll have one or two\"   No cannabis    Meds:   Takes scheduled medications daily as prescribed. Denies side effects. Often forgets afternoon Adderall.     otc - womens multivitamin, calcium supplements, \"happy\" vitamin with vitamin D and saffron, iron supplement, zyrtec, fish oil.     Therapy:   Not currently     SI: \"No\"      FAMILY and SOCIAL HISTORY                                 pt reported   Portions copy-forwarded from previous assessment. 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 House in [place]      Social/ Spiritual Support- , friends, sister, and mom      Feels Safe at Home- yes      PAST MED TRIALS      Medication Max Dose (mg) Dates / Duration Helpful? DC Reason / Adverse Effects?   citalopram 30 mg current Y    adderall 20 mg Current (at 5mg) ? Initially helpful, also too short-acting for lifestyle   vyvanse  50 mg  current Y    bupropion 150 mg? Dec 2019  Worsened anxiety   sertaline 100? 150? 11/17-7/19 Y Lost efficacy over time                    7/12/21: Transfer of care diagnostic assessment.  7/22/21: Started Adderall XR 10 mg daily (recently diagnosed with ADHD, predominantly inattentive presentation).  8/05/21: Increased to Adderall XR 15 mg daily  9/08/21: Added Adderall 5 mg daily PRN and encouraged pt to connect with therapist  10/12/21: Discontinued Adderall XR 15 mg daily and Adderall 5 mg daily PRN. Started lisdexamfetamine 30 mg daily.  1/20/22: Increased lisdexamfetamine to 40 mg daily.   2/10/22: Increased lisdexamfetamine to 50 mg daily.   3/21/22: no changes  7/15/22: transfer of care diagnostic assessment, added 5mg adderall in afternoons as-needed    09/23/22: no changes  12/20/22: added guanfacine XR 1 mg at bedtime      MEDICAL HISTORY and ALLERGY       ALLERGIES: Patient has no known allergies.    Patient Active Problem List   Diagnosis     Allergic rhinitis     Other type of migraine     Multiple atypical nevi     Urinary incontinence in female     Mild major depression (H)     Generalized anxiety disorder     Contraception management     Metrorrhagia        MEDICAL REVIEW OF SYSTEMS   Contraception-  Yes   Pregnant- No    No aches/pain, fevers/chills, GI, headaches, history of seizures.   Periods [id number] on birth control, denies possibility of being pregnant.       "MEDICATIONS     Current Outpatient Medications   Medication Sig Dispense Refill     MIRANDA 1/35 1-35 MG-MCG tablet TAKE 1 TABLET BY MOUTH DAILY USE CONTINUOUSLY TO HAVE MENSES EVERY 3 MONTHS 112 tablet 1     amphetamine-dextroamphetamine (ADDERALL) 5 MG tablet Take 1 tablet (5 mg) by mouth daily as needed (adhd) 30 tablet 0     CALCIUM PO        Cholecalciferol (VITAMIN D3 PO)        citalopram (CELEXA) 20 MG tablet Take 1.5 tablets (30 mg) by mouth daily 45 tablet 2     Ferrous Sulfate (IRON PO)        lisdexamfetamine (VYVANSE) 50 MG capsule Take 1 capsule (50 mg) by mouth every morning 30 capsule 0     lisdexamfetamine (VYVANSE) 50 MG capsule Take 1 capsule (50 mg) by mouth every morning 30 capsule 0     lisdexamfetamine (VYVANSE) 50 MG capsule Take 1 capsule (50 mg) by mouth every morning 30 capsule 0     Multiple Vitamin (MULTIVITAMIN ADULT PO)        Omega-3 1000 MG capsule Take 2 g by mouth       ZYRTEC 10 MG OR TABS 1 TABLET DAILY 30 prn      VITALS   There were no vitals taken for this visit.    MENTAL STATUS EXAM     Alertness: alert  and oriented  Appearance: well groomed  Behavior/Demeanor: cooperative, pleasant and calm, with good  eye contact   Speech: regular rate and rhythm  Language: intact  Psychomotor: normal or unremarkable  Mood: \"really good\"  Affect: full range and reactive; congruent to: mood- yes, content- yes  Thought Process/Associations: unremarkable  Thought Content:  Reports none;  Denies suicidal & violent ideation and delusions  Perception:  Reports none;  Denies auditory hallucinations, visual hallucinations and paranoia  Insight: good  Judgment: good  Cognition: adequate for interview  [facility]     LABS and DATA     PHQ 7/14/2022 9/23/2022 12/20/2022   PHQ-9 Total Score 3 1 2   Q9: Thoughts of better off dead/self-harm past 2 weeks Not at all Not at all Not at all       Recent Labs   Lab Test 12/09/21  0925 07/19/18  1120   CR 0.81 0.73   GFRESTIMATED 90 " >60     Recent Labs   Lab Test 07/19/18  1120   AST 20   ALT 14   ALKPHOS 58     ECG 12/9/21 QTc = 414 ms     PSYCHOTROPIC DRUG INTERACTIONS                                                       PSYCHCLINICDDI   Per [employer]      MANAGEMENT:  Monitoring for adverse effects and patient is aware of risks     RISK STATEMENT for SAFETY     Saba did not appear to be an imminent safety risk to self or others.    TREATMENT RISK STATEMENT: The risks, benefits, alternatives and potential adverse effects have been discussed and are understood by the pt. The pt understands the risks of using street drugs or alcohol. There are no medical contraindications, the pt agrees to treatment with the ability to do so. The pt knows to call the clinic for any problems or to access emergency care if needed.  Medical and substance use concerns are documented above.  Psychotropic drug interaction check was done, including changes made today.     PROVIDER: Carolann Antony MD    Patient not staffed in clinic.  Note will be reviewed and signed by supervisor Dr. [place].      Level of Medical Decision Making:   - At least 1 chronic problem that is not stable  - Engaged in prescription drug management during visit (discussed any medication benefits, side effects, alternatives, etc.)

## 2022-12-20 NOTE — PROGRESS NOTES
Angelic Marrero is a 42 year old who is being evaluated via a billable video visit.      Pt will join video visit via: MessageCast  If there are problems joining the visit, send backup video invite via: Text to preferred phone: [phone]    Reason for telehealth visit: Patient has requested telehealth visit    Originating location (patient location): Patient's home    Will anyone else be joining the visit? No

## 2023-01-13 ENCOUNTER — MYC REFILL (OUTPATIENT)
Dept: PSYCHIATRY | Facility: CLINIC | Age: 43
End: 2023-01-13

## 2023-01-13 DIAGNOSIS — F90.0 ATTENTION DEFICIT HYPERACTIVITY DISORDER (ADHD), PREDOMINANTLY INATTENTIVE TYPE: ICD-10-CM

## 2023-01-13 DIAGNOSIS — N92.1 METRORRHAGIA: ICD-10-CM

## 2023-01-13 DIAGNOSIS — Z30.41 ENCOUNTER FOR SURVEILLANCE OF CONTRACEPTIVE PILLS: ICD-10-CM

## 2023-01-13 RX ORDER — LISDEXAMFETAMINE DIMESYLATE 50 MG/1
50 CAPSULE ORAL EVERY MORNING
Qty: 30 CAPSULE | Refills: 0 | Status: SHIPPED | OUTPATIENT
Start: 2023-01-13 | End: 2023-02-20

## 2023-01-13 NOTE — TELEPHONE ENCOUNTER
[place]  lisdexamfetamine (VYVANSE) 50 MG capsule 12/16 #30, 11/15 #30, 10/17 #30    Writer routed to provider for approval.

## 2023-01-13 NOTE — TELEPHONE ENCOUNTER
Last Seen 12/20/22  RTC 3 months  Cancel 0  No-Show 0    Next Appt 1/17/23    Incoming Refill From pt request via MyChart    Medication Requested   lisdexamfetamine (VYVANSE) 50 MG capsule    Directions   Route: Take 1 capsule (50 mg) by mouth every morning - Oral    Qty 30    Last Refill 12/9/22    Medication Refill Pended Per Refill Protocol

## 2023-01-15 RX ORDER — NORETHINDRONE AND ETHINYL ESTRADIOL 1 MG-35MCG
KIT ORAL
Qty: 112 TABLET | Refills: 1 | Status: SHIPPED | OUTPATIENT
Start: 2023-01-15 | End: 2023-04-04

## 2023-01-16 NOTE — TELEPHONE ENCOUNTER
"Last Written Prescription Date:  7/31/2022  Last Fill Quantity: 112,  # refills: 1   Last office visit provider:  3/31/2022     Requested Prescriptions   Pending Prescriptions Disp Refills     ALAYCEN 1/35 1-35 MG-MCG tablet [Pharmacy Med Name: ALYACEN 1-35 28 TABLET] 112 tablet 1     Sig: TAKE 1 TABLET BY MOUTH DAILY USE CONTINUOUSLY TO HAVE MENSES EVERY 3 MONTHS       Contraceptives Protocol Passed - 1/13/2023 12:41 AM        Passed - Patient is not a current smoker if age is 35 or older        Passed - Recent (12 mo) or future (30 days) visit within the authorizing provider's specialty     Patient has had an office visit with the authorizing provider or a provider within the authorizing providers department within the previous 12 mos or has a future within next 30 days. See \"Patient Info\" tab in inbasket, or \"Choose Columns\" in Meds & Orders section of the refill encounter.              Passed - Medication is active on med list        Passed - No active pregnancy on record        Passed - No positive pregnancy test in past 12 months       Hormone Replacement Therapy Passed - 1/13/2023 12:41 AM        Passed - Blood pressure under 140/90 in past 12 months     BP Readings from Last 3 Encounters:   03/31/22 110/70   01/14/22 120/77   12/09/21 109/65                 Passed - Recent (12 mo) or future (30 days) visit within the authorizing provider's specialty     Patient has had an office visit with the authorizing provider or a provider within the authorizing providers department within the previous 12 mos or has a future within next 30 days. See \"Patient Info\" tab in inScriptedsket, or \"Choose Columns\" in Meds & Orders section of the refill encounter.              Passed - Medication is active on med list        Passed - Patient is 18 years of age or older        Passed - No active pregnancy on record        Passed - No positive pregnancy test on record in past 12 months             Lisseth Arnold RN 01/15/23 8:19 " PM

## 2023-01-16 NOTE — TELEPHONE ENCOUNTER
Thanks for refilling prescription. Please contact patient to schedule her annual physical + med check / due 3/31/23.  Thanks!  Ofelia Mclain, DO

## 2023-01-17 ENCOUNTER — VIRTUAL VISIT (OUTPATIENT)
Dept: PSYCHIATRY | Facility: CLINIC | Age: 43
End: 2023-01-17
Attending: PSYCHIATRY & NEUROLOGY
Payer: COMMERCIAL

## 2023-01-17 DIAGNOSIS — F90.0 ATTENTION DEFICIT HYPERACTIVITY DISORDER (ADHD), PREDOMINANTLY INATTENTIVE TYPE: ICD-10-CM

## 2023-01-17 DIAGNOSIS — F32.9 MAJOR DEPRESSIVE DISORDER, REMISSION STATUS UNSPECIFIED, UNSPECIFIED WHETHER RECURRENT: Primary | ICD-10-CM

## 2023-01-17 DIAGNOSIS — F41.1 GENERALIZED ANXIETY DISORDER: ICD-10-CM

## 2023-01-17 PROCEDURE — 99214 OFFICE O/P EST MOD 30 MIN: CPT | Mod: GT | Performed by: STUDENT IN AN ORGANIZED HEALTH CARE EDUCATION/TRAINING PROGRAM

## 2023-01-17 RX ORDER — DEXTROAMPHETAMINE SACCHARATE, AMPHETAMINE ASPARTATE, DEXTROAMPHETAMINE SULFATE AND AMPHETAMINE SULFATE 1.25; 1.25; 1.25; 1.25 MG/1; MG/1; MG/1; MG/1
5 TABLET ORAL DAILY PRN
Qty: 30 TABLET | Refills: 0 | Status: SHIPPED | OUTPATIENT
Start: 2023-01-17 | End: 2023-03-14

## 2023-01-17 RX ORDER — CITALOPRAM HYDROBROMIDE 20 MG/1
30 TABLET ORAL DAILY
Qty: 135 TABLET | Refills: 1 | Status: SHIPPED | OUTPATIENT
Start: 2023-01-17 | End: 2023-03-14

## 2023-01-17 NOTE — Clinical Note
Pended Adderall refill. No changes to stimulants today (my note is already in) for your reference. ~Carolann

## 2023-01-17 NOTE — PROGRESS NOTES
Video- Visit Details  Type of service:  video visit for medication management  Time of service:    Video Start Time: 8:55   Video End Time: 9:14  Reason for video visit:  Patient convenience   Originating Site (patient location):  [facility]- [place]   Location- Patient's home  Distant Site (provider location):  [facility]  Mode of Communication:  Video Conference via [employer]       [facility]  Psychiatry Clinic  MEDICAL PROGRESS NOTE     CARE TEAM:  PCP- Ofelia Mclain    Psychotherapist- None (previously saw ADHD )  This person is a 42 year old who uses the name Saba and pronouns she, her.      DIAGNOSIS     Attention deficit hyperactivity disorder, predominantly inattentive presentation  Generalized anxiety disorder  Major depressive disorder, recurrent, full remission     ASSESSMENT     Angelic Marrero is a 42 year old female who carries diagnoses of ADHD, NAYELY, and MDD who reports doing fair since last appointment. No safety concerns. This is in the context of stressor of looking for work.     Mood, sleep, physical health have been good. Energy and focus still poor in the afternoons. States no benefit from adding guanfacine to med regiment, only noticing more tired during the day. We discussed discontinuing this at this time. Also encouraged to take afternoon prn more frequently and earlier in the day (lunchtime vs 3pm) which she will try.     As mood has been stable >1 year on current antidepressant, reasonable to trial reduction or discontinuation of this at this time, which the patient expressed interest in though will defer discussion until next appointment given other medication changes today and current stressors.    No other questions nor concerns.       Future considerations  - room to increase vyvanse if continues to find helpful, concern for developing tolerance  - consider addition of nonstimulant medication such as atomoxetine or guanfacine in lieu of further  "increasing stimulant should patient exhibit signs of side effects or tolerance (as of Jan 23, guanfacine too sedating)  - avoid bupropion, previously did not tolerate, worsened anxiety   - consider decreasing / discontinuing antidepressant as tolerated given long period of stability     [place]:  Indicates taking controlled medication as prescribed.     PLAN                                                                                                                1) Meds-   - STOP guanfacine XR 1 mg at bedtime   - Continue lisdexamfetamine 50 mg daily  - Continue amphetamine-dextroamphetamine 5 mg in the afternoons as-needed, rarely takes, encouraged to increase utilization of this  - Continue citalopram 30 mg daily     OTC - Melatonin prn,  womens multivitamin, calcium supplements, \"happy\" vitamin with vitamin D and saffron, iron supplement, zyrtec, fish oil, Alacen     2) Psychotherapy- none. Declined at 7/15/22 appt    3) Next due-  Labs- n/a  EKG- prn  Rating scales- PHQ9 and NAYELY-7 at every visit    4) Referrals-  none    5) Dispo- March 14th at 8:50     PERTINENT BACKGROUND                                                    [most recent eval 07/15/22]   Previous diagnoses include generalized anxiety disorder and mild major depressive disorder. Experienced symptoms of anxiety throughout her life, but noticeably worsened after birth of her firstborn. She has gotten past psychiatric care through her primary care provider and did not start any psychiatric medications until she finished nursing her youngest child around 2017. She first experienced depression at that time. Most recently, her 2nd oldest (son) was diagnosed with ADHD and responded well to treatment; noticed similar symptoms in self and pursued ADHD diagnosis which was confirmed (see 07/13/21 progress note by Dr. Janna York).      Psych pertinent item history includes mutiple psychotropic trials      SUBJECTIVE   Last seen in " "clinic 1 month ago, at which time reported good mood and fair ADHD symptoms. Low-dose guanfacine ER added. Also discussed trialing dose-reduction of antidepressant which we deferred. Today she reports.     Mood:   \"good\" and \"the same\"    \"I started the guanfacine and I'm not sure if that's helping... I don't know if I feel any different. I definitely feel more tired.\" possibly from not working out. \"when it comes to my mind quieting and thinking through things... I don't have that feeling. I'm not sure if it's working or not.\"     Social update:   Family doing good, kids back in school.    Looking for a job now. Hoping for remote, part-time, marketing position. States she is \"not excited at all, don't want to. 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 Caffeine \"sporadic, once or twice a week.\"     Meds:   States taking scheduled medications consistently. Endorses daytime tiredness including, other wise no side effects, no sexual side effects.     Takes afternoon prn infrequently (1-2x per week), usually around 3PM.    Therapy: none     SI: \"no\"      FAMILY and SOCIAL HISTORY                                 pt reported   Portions copy-forwarded from previous assessment. 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 House in [place]      Social/ Spiritual Support- , friends, sister, and " mom      Feels Safe at Home- yes      PAST MED TRIALS      Medication Max Dose (mg) Dates / Duration Helpful? DC Reason / Adverse Effects?   citalopram 30 mg current Y    adderall 20 mg Current (at 5mg) ? Initially helpful, also too short-acting for lifestyle   vyvanse  50 mg  current Y    bupropion 150 mg? Dec 2019  Worsened anxiety   sertaline 100? 150? 11/17-7/19 Y Lost efficacy over time                    7/12/21: Transfer of care diagnostic assessment.  7/22/21: Started Adderall XR 10 mg daily (recently diagnosed with ADHD, predominantly inattentive presentation).  8/05/21: Increased to Adderall XR 15 mg daily  9/08/21: Added Adderall 5 mg daily PRN and encouraged pt to connect with therapist  10/12/21: Discontinued Adderall XR 15 mg daily and Adderall 5 mg daily PRN. Started lisdexamfetamine 30 mg daily.  1/20/22: Increased lisdexamfetamine to 40 mg daily.   2/10/22: Increased lisdexamfetamine to 50 mg daily.   3/21/22: no changes  7/15/22: transfer of care diagnostic assessment, added 5mg adderall in afternoons as-needed    09/23/22: no changes  12/20/22: added guanfacine XR 1 mg at bedtime   01/17/23: guanfacine discontinued. Encouraged to take afternoon prn earlier in the day       MEDICAL HISTORY and ALLERGY       ALLERGIES: Patient has no known allergies.    Patient Active Problem List   Diagnosis     Allergic rhinitis     Other type of migraine     Multiple atypical nevi     Urinary incontinence in female     Mild major depression (H)     Generalized anxiety disorder     Contraception management     Metrorrhagia        MEDICAL REVIEW OF SYSTEMS   Contraception-  Yes   Pregnant- No    No aches/pain, fevers/chills, GI, headaches, history of seizures.   Periods [id number] on birth control, denies possibility of being pregnant.      MEDICATIONS     Current Outpatient Medications   Medication Sig Dispense Refill     ALAYCEN 1/35 1-35 MG-MCG tablet TAKE 1 TABLET BY MOUTH DAILY USE CONTINUOUSLY TO HAVE MENSES  "EVERY 3 MONTHS 112 tablet 1     amphetamine-dextroamphetamine (ADDERALL) 5 MG tablet Take 1 tablet (5 mg) by mouth daily as needed (adhd) 30 tablet 0     CALCIUM PO        Cholecalciferol (VITAMIN D3 PO)        citalopram (CELEXA) 20 MG tablet Take 1.5 tablets (30 mg) by mouth daily 45 tablet 2     Ferrous Sulfate (IRON PO)        guanFACINE (INTUNIV) 1 MG TB24 24 hr tablet Take 1 tablet (1 mg) by mouth At Bedtime 30 tablet 1     lisdexamfetamine (VYVANSE) 50 MG capsule Take 1 capsule (50 mg) by mouth every morning 30 capsule 0     Multiple Vitamin (MULTIVITAMIN ADULT PO)        Omega-3 1000 MG capsule Take 2 g by mouth       ZYRTEC 10 MG OR TABS 1 TABLET DAILY 30 prn      VITALS   There were no vitals taken for this visit.    MENTAL STATUS EXAM     Alertness: alert  and oriented  Appearance: well groomed  Behavior/Demeanor: cooperative, pleasant and calm, with good  eye contact   Speech: regular rate and rhythm  Language: intact  Psychomotor: normal or unremarkable  Mood: \"the same\"  Affect: full range and reactive; congruent to: mood- yes, content- yes  Thought Process/Associations: unremarkable  Thought Content:  Reports none;  Denies suicidal & violent ideation and delusions  Perception:  Reports none;  Denies auditory hallucinations, visual hallucinations and paranoia  Insight: good  Judgment: good  Cognition: adequate for interview  [facility]     LABS and DATA     PHQ 7/14/2022 9/23/2022 12/20/2022   PHQ-9 Total Score 3 1 2   Q9: Thoughts of better off dead/self-harm past 2 weeks Not at all Not at all Not at all       Recent Labs   Lab Test 12/09/21  0925 07/19/18  1120   CR 0.81 0.73   GFRESTIMATED 90 >60     Recent Labs   Lab Test 07/19/18  1120   AST 20   ALT 14   ALKPHOS 58     ECG 12/9/21 QTc = 414 ms     PSYCHOTROPIC DRUG INTERACTIONS                                                       PSYCHCLINICDDI   Per [employer]       MANAGEMENT:  Monitoring for adverse effects and patient " is aware of risks     RISK STATEMENT for SAFETY     Saba did not appear to be an imminent safety risk to self or others.     TREATMENT RISK STATEMENT: The risks, benefits, alternatives and potential adverse effects have been discussed and are understood by the pt. The pt understands the risks of using street drugs or alcohol. There are no medical contraindications, the pt agrees to treatment with the ability to do so. The pt knows to call the clinic for any problems or to access emergency care if needed.  Medical and substance use concerns are documented above.  Psychotropic drug interaction check was done, including changes made today.     PROVIDER: Carolann Antony MD    Patient not staffed in clinic.  Note will be reviewed and signed by supervisor Dr. Greene.      Level of Medical Decision Making:   - At least 1 chronic problem that is not stable  - Engaged in prescription drug management during visit (discussed any medication benefits, side effects, alternatives, etc.)

## 2023-01-17 NOTE — PROGRESS NOTES
Angelic Marrero is a 42 year old who is being evaluated via a billable video visit.      Pt will join video visit via: [employer]  If there are problems joining the visit, send backup video invite via: Text to preferred phone: [phone]    Reason for telehealth visit: Patient has requested telehealth visit    Originating location (patient location): Patient's home    Will anyone else be joining the visit? No

## 2023-01-17 NOTE — PATIENT INSTRUCTIONS
**For crisis resources, please see the information at the end of this document**   Patient Education    Thank you for coming to the [facility].     Lab Testing:  If you had lab testing today and your results are reassuring or normal they will be mailed to you or sent through [employer] within 7 days. If the lab tests need quick action we will call you with the results. The phone number we will call with results is # [phone]. If this is not the best number please call our clinic and change the number.     Medication Refills:  If you need any refills please call your pharmacy and they will contact us. Our fax number for refills is [phone].   Three business days of notice are needed for general medication refill requests.   Five business days of notice are needed for controlled substance refill requests.   If you need to change to a different pharmacy, please contact the new pharmacy directly. The new pharmacy will help you get your medications transferred.     Contact Us:  Please call [phone] during business hours (8-5:00 M-F).   If you have medication related questions after clinic hours, or on the weekend, please call [phone].     Financial Assistance [phone]   Medical Records [phone]       MENTAL HEALTH CRISIS RESOURCES:  For a emergency help, please call 911 or go to the nearest Emergency Department.     Emergency Walk-In Options:   [facility] Unit @ [facility] ([place]): [phone] - Specialized mental health emergency area designed to be calming  [facility] ([place]): [phone]  [facility] ([place]): [phone]  [facility]): [phone]    [facility] Crisis Information:   [place]: [phone]  [place]: [phone]  [place] ([place]) - Adult: [phone]     Child: [phone]  [place] - Adult: [phone]     Child: [phone]  [place]:  [phone]  List of all [facility] resources:   https://[place].gov/dhs/people-we-serve/adults/health-care/mental-health/resources/crisis-contacts.jsp    National Crisis Information:   Crisis Text Line: Text  [place]  to 319377  Suicide & Crisis Lifeline: 988  National Suicide Prevention Lifeline: 8-426-363-TALK (1-439.217.3178)       For online chat options, visit https://suicidepreventionlifeline.org/chat/  Poison Control Center: 1-707-668-3110  Trans Lifeline: 4-494-568-9206 - Hotline for transgender people of all ages  The [employer]: 0-304-936-3509 - Hotline for LGBT youth     For Non-Emergency Support:   Fast Tracker: Mental Health & Substance Use Disorder Resources -   https://www.PearFundsckInfluAdsn.org/

## 2023-02-20 ENCOUNTER — MYC REFILL (OUTPATIENT)
Dept: PSYCHIATRY | Facility: CLINIC | Age: 43
End: 2023-02-20
Payer: COMMERCIAL

## 2023-02-20 DIAGNOSIS — F90.0 ATTENTION DEFICIT HYPERACTIVITY DISORDER (ADHD), PREDOMINANTLY INATTENTIVE TYPE: ICD-10-CM

## 2023-02-20 RX ORDER — LISDEXAMFETAMINE DIMESYLATE 50 MG/1
50 CAPSULE ORAL EVERY MORNING
Qty: 30 CAPSULE | Refills: 0 | Status: SHIPPED | OUTPATIENT
Start: 2023-02-20 | End: 2023-03-14

## 2023-02-20 NOTE — TELEPHONE ENCOUNTER
Last Seen 1/17/23  RTC 3/14/23  Cancel 0  No-Show 0    Next Appt 3/14/23    Incoming Refill From pt request via MyChart    Medication Requested   lisdexamfetamine (VYVANSE) 50 MG capsule    Directions   Route: Take 1 capsule (50 mg) by mouth every morning - Oral    Qty 30    Last Refill 1/13/23    Medication Refill Pended Per Refill Protocol

## 2023-02-20 NOTE — TELEPHONE ENCOUNTER
[place]  lisdexamfetamine (VYVANSE) 50 MG capsule 1/17 #30, 12/16 #30,   11/15 #30    Writer routed to provider for approval.

## 2023-03-14 ENCOUNTER — VIRTUAL VISIT (OUTPATIENT)
Dept: PSYCHIATRY | Facility: CLINIC | Age: 43
End: 2023-03-14
Attending: PSYCHIATRY & NEUROLOGY
Payer: COMMERCIAL

## 2023-03-14 DIAGNOSIS — F90.0 ATTENTION DEFICIT HYPERACTIVITY DISORDER (ADHD), PREDOMINANTLY INATTENTIVE TYPE: Primary | ICD-10-CM

## 2023-03-14 DIAGNOSIS — F41.1 GENERALIZED ANXIETY DISORDER: ICD-10-CM

## 2023-03-14 DIAGNOSIS — F32.9 MAJOR DEPRESSIVE DISORDER, REMISSION STATUS UNSPECIFIED, UNSPECIFIED WHETHER RECURRENT: ICD-10-CM

## 2023-03-14 PROCEDURE — G0463 HOSPITAL OUTPT CLINIC VISIT: HCPCS | Mod: PN,GT | Performed by: STUDENT IN AN ORGANIZED HEALTH CARE EDUCATION/TRAINING PROGRAM

## 2023-03-14 PROCEDURE — 99214 OFFICE O/P EST MOD 30 MIN: CPT | Mod: VID | Performed by: STUDENT IN AN ORGANIZED HEALTH CARE EDUCATION/TRAINING PROGRAM

## 2023-03-14 RX ORDER — DEXTROAMPHETAMINE SACCHARATE, AMPHETAMINE ASPARTATE, DEXTROAMPHETAMINE SULFATE AND AMPHETAMINE SULFATE 1.25; 1.25; 1.25; 1.25 MG/1; MG/1; MG/1; MG/1
5 TABLET ORAL DAILY PRN
Qty: 90 TABLET | Refills: 0 | Status: SHIPPED | OUTPATIENT
Start: 2023-03-14 | End: 2023-04-11

## 2023-03-14 RX ORDER — CITALOPRAM HYDROBROMIDE 20 MG/1
30 TABLET ORAL DAILY
Qty: 135 TABLET | Refills: 0 | Status: SHIPPED | OUTPATIENT
Start: 2023-03-14 | End: 2023-06-26

## 2023-03-14 RX ORDER — LISDEXAMFETAMINE DIMESYLATE 50 MG/1
50 CAPSULE ORAL EVERY MORNING
Qty: 90 CAPSULE | Refills: 0 | Status: SHIPPED | OUTPATIENT
Start: 2023-03-21 | End: 2023-06-23

## 2023-03-14 ASSESSMENT — PATIENT HEALTH QUESTIONNAIRE - PHQ9
10. IF YOU CHECKED OFF ANY PROBLEMS, HOW DIFFICULT HAVE THESE PROBLEMS MADE IT FOR YOU TO DO YOUR WORK, TAKE CARE OF THINGS AT HOME, OR GET ALONG WITH OTHER PEOPLE: NOT DIFFICULT AT ALL
SUM OF ALL RESPONSES TO PHQ QUESTIONS 1-9: 2
SUM OF ALL RESPONSES TO PHQ QUESTIONS 1-9: 2

## 2023-03-14 NOTE — PROGRESS NOTES
Angelic Marrero is a 42 year old who is being evaluated via a billable video visit.      Pt will join video visit via: [employer]  If there are problems joining the visit, send backup video invite via: Text to preferred phone: [phone]    Originating location (patient location): Patient's home    Will anyone else be joining the visit? No

## 2023-03-14 NOTE — NURSING NOTE
Is the patient currently in the state of [place]? YES    Visit mode:VIDEO    If the visit is dropped, the patient can be reconnected by: VIDEO VISIT: Text to cell phone: [phone]    Will anyone else be joining the visit? NO      How would you like to obtain your AVS? MyChart    Are changes needed to the allergy or medication list? NO    Reason for visit: Follow up

## 2023-03-14 NOTE — PROGRESS NOTES
"Video- Visit Details  Type of service:  video visit for medication management  Time of service:    Video Start Time: 8:52   Video End Time: 9:12  Reason for video visit:  Patient convenience   Originating Site (patient location):  [facility] [place]   Location- Patient's home  Distant Site (provider location):  [facility]  Mode of Communication:  Video Conference via [employer]       [facility]  Psychiatry Clinic  MEDICAL PROGRESS NOTE     CARE TEAM:  PCP- Ofelia Mclain    Psychotherapist- None (previously saw ADHD )  This person is a 42 year old who uses the name Saba and pronouns she, her.      DIAGNOSIS     Attention deficit hyperactivity disorder, predominantly inattentive presentation  Generalized anxiety disorder  Major depressive disorder, recurrent, full remission     ASSESSMENT     Angelic Marrero is a 42 year old female who carries diagnoses of ADHD, NAYELY, and MDD who reports doing well overall. Rates job hunt and modest weight gain as stressful though finds coping well. Has been more compliant with afternoon Adderal IR which she rates as helpful for mental health symptoms.     As mood has been stable >1 year on current antidepressant, reasonable to trial reduction or discontinuation of this at this time, which the patient expressed interest in though will defer discussion until next appointment given other medication changes today and current stressors.    Due for vitals. I encouraged patient to attend next appointment in-person. She has in-person PCP appointment in the interim and we will be able to view vitals from that encounter. Declined lifestyle referral today for weight loss goals though states is considering this. Is also interested in \"Four Tendencies\" online assessment, which we discussed today and I shared with her via [employer].     No safety concerns today other questions nor concerns.       Future considerations  - room to increase vyvanse if continues to " "find helpful, concern for developing tolerance  - consider addition of nonstimulant medication such as atomoxetine or guanfacine in lieu of further increasing stimulant should patient exhibit signs of side effects or tolerance (as of Jan 23, guanfacine too sedating)  - avoid bupropion, previously did not tolerate, worsened anxiety   - consider decreasing / discontinuing antidepressant as tolerated given long period of stability     [place] was checked today:  Indicates taking controlled medication as prescribed.     PLAN                                                                                                                1) Meds-   - Continue lisdexamfetamine 50 mg daily  - Continue amphetamine-dextroamphetamine 5 mg in the afternoons as-needed, rarely takes, encouraged to increase utilization of this  - Continue citalopram 30 mg daily     OTC - Melatonin prn,  womens multivitamin, calcium supplements, \"happy\" vitamin with vitamin D and saffron, iron supplement, zyrtec, fish oil, Alacen     2) Psychotherapy- none. Declined at 7/15/22 appt    3) Next due-  Labs- n/a  EKG- prn  Rating scales- PHQ9 and NAYELY-7 at every visit    4) Referrals-  none    5) Dispo- March 14th at 8:50     PERTINENT BACKGROUND                                                    [most recent eval 07/15/22]   Previous diagnoses include generalized anxiety disorder and mild major depressive disorder. Experienced symptoms of anxiety throughout her life, but noticeably worsened after birth of her firstborn. She has gotten past psychiatric care through her primary care provider and did not start any psychiatric medications until she finished nursing her youngest child around 2017. She first experienced depression at that time. Most recently, her 2nd oldest (son) was diagnosed with ADHD and responded well to treatment; noticed similar symptoms in self and pursued ADHD diagnosis which was confirmed (see 07/13/21 progress note by Dr. Saldivar " "Chela).      Psych pertinent item history includes mutiple psychotropic trials      SUBJECTIVE   Last seen in clinic 2 months ago, at which time guanfacine discontinued, encouraged to utilize afternoon Adderall prn earlier in the day.  Since last being seen, they report:    Mood:   \"good. I've been more consistent about taking that afternoon pill and that's really helping me feel better.\" Thinks using phone alarm \"would totally help.\"      Social update:   Job hunt \"it's lot of application and hearing nothing.\" Looking for remote marketing work.      Sleep:   \"good, no problems\"    Physical Health:   Changes \"nope, not at all. Unfortunately I've gained weight over the last couple months. I stopped going to my [employer] workouts.\"     \"have been taking dogs for 2-mile walks.\"      \"I eat pretty good. I feel pretty healthy during the day and at night I'm starving and just start eating whatever.\" Evening snacking \"I have like no self-control by the end of the day.\"     Substance use:   Coffee \"not since I started stimulants. It made me too nervous.\"  \"occasionally a drink or two but not often... I just get headaches the next day.\"     Meds:   Taking morning meds daily as prescribed. Denies side effects (including sexual side effects). Taking afternoon dose ~4/7 days, at noon.     Taking fish oil, multivitamin, vitamin d, melatonin 5mg before bed     Therapy: none    SI:  \"no\"        FAMILY and SOCIAL HISTORY                                 pt reported   Portions copy-forwarded from previous assessment. 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 House in [place]      Social/ Spiritual Support- , friends, sister, and mom      Feels Safe at Home- yes      PAST MED TRIALS      Medication Max Dose (mg) Dates / Duration Helpful? DC Reason / Adverse Effects?   citalopram 30 mg current Y    adderall 20 mg Current (at 5mg) ? Initially helpful, also too short-acting for lifestyle   vyvanse  50 mg  current Y    bupropion 150 mg? Dec 2019  Worsened anxiety   sertaline 100? 150? 11/17-7/19 Y Lost efficacy over time                    7/12/21: Transfer of care diagnostic assessment.  7/22/21: Started Adderall XR 10 mg daily (recently diagnosed with ADHD, predominantly inattentive presentation).  8/05/21: Increased to Adderall XR 15 mg daily  9/08/21: Added Adderall 5 mg daily PRN and encouraged pt to connect with therapist  10/12/21: Discontinued Adderall XR 15 mg daily and Adderall 5 mg daily PRN. Started lisdexamfetamine 30 mg daily.  1/20/22: Increased lisdexamfetamine to 40 mg daily.   2/10/22: Increased lisdexamfetamine to 50 mg daily.   3/21/22: no changes  7/15/22: transfer of care diagnostic assessment, added 5mg adderall in afternoons as-needed    09/23/22: no changes  12/20/22: added guanfacine XR 1 mg at bedtime   01/17/23: guanfacine discontinued. Encouraged to take afternoon prn earlier in the day    03/14/23:     MEDICAL HISTORY and ALLERGY       ALLERGIES: Patient has no known allergies.    Patient Active Problem List   Diagnosis     Allergic rhinitis     Other type of migraine     Multiple atypical nevi     Urinary incontinence in female     Mild major depression (H)     Generalized anxiety disorder     Contraception management     Metrorrhagia        MEDICAL REVIEW OF SYSTEMS   Contraception-  Yes   Pregnant- No    No aches/pain, fevers/chills, GI, headaches, history of seizures.   Periods [id number] on birth control, denies possibility of being pregnant.      MEDICATIONS     Current Outpatient Medications  "  Medication Sig Dispense Refill     ALAANTWONEN 1/35 1-35 MG-MCG tablet TAKE 1 TABLET BY MOUTH DAILY USE CONTINUOUSLY TO HAVE MENSES EVERY 3 MONTHS 112 tablet 1     amphetamine-dextroamphetamine (ADDERALL) 5 MG tablet Take 1 tablet (5 mg) by mouth daily as needed (adhd) 30 tablet 0     CALCIUM PO        Cholecalciferol (VITAMIN D3 PO)        citalopram (CELEXA) 20 MG tablet Take 1.5 tablets (30 mg) by mouth daily 135 tablet 1     Ferrous Sulfate (IRON PO)        lisdexamfetamine (VYVANSE) 50 MG capsule Take 1 capsule (50 mg) by mouth every morning 30 capsule 0     Multiple Vitamin (MULTIVITAMIN ADULT PO)        Omega-3 1000 MG capsule Take 2 g by mouth       ZYRTEC 10 MG OR TABS 1 TABLET DAILY 30 prn      VITALS   There were no vitals taken for this visit.    MENTAL STATUS EXAM     Alertness: alert  and oriented  Appearance: well groomed  Behavior/Demeanor: cooperative, pleasant and calm, with good  eye contact   Speech: regular rate and rhythm  Language: intact  Psychomotor: normal or unremarkable  Mood: \"good\"  Affect: full range and reactive; congruent to: mood- yes, content- yes  Thought Process/Associations: unremarkable  Thought Content:  Reports none;  Denies suicidal & violent ideation and delusions  Perception:  Reports none;  Denies auditory hallucinations, visual hallucinations and paranoia  Insight: good  Judgment: good  Cognition: adequate for interview  Gait and Station: N/A ([facility]     LABS and DATA     PHQ 7/14/2022 9/23/2022 12/20/2022   PHQ-9 Total Score 3 1 2   Q9: Thoughts of better off dead/self-harm past 2 weeks Not at all Not at all Not at all       Recent Labs   Lab Test 12/09/21  0925 07/19/18  1120   CR 0.81 0.73   GFRESTIMATED 90 >60     Recent Labs   Lab Test 07/19/18  1120   AST 20   ALT 14   ALKPHOS 58     ECG 12/9/21 QTc = 414 ms     PSYCHOTROPIC DRUG INTERACTIONS                                                       PSYCHCLINICDDI   Per [employer]       MANAGEMENT:  Monitoring for " adverse effects and patient is aware of risks     RISK STATEMENT for SAFETY     Saba did not appear to be an imminent safety risk to self or others.      TREATMENT RISK STATEMENT: The risks, benefits, alternatives and potential adverse effects have been discussed and are understood by the pt. The pt understands the risks of using street drugs or alcohol. There are no medical contraindications, the pt agrees to treatment with the ability to do so. The pt knows to call the clinic for any problems or to access emergency care if needed.  Medical and substance use concerns are documented above.  Psychotropic drug interaction check was done, including changes made today.     PROVIDER: Carolann Antony MD    Patient not staffed in clinic.  Note will be reviewed and signed by supervisor Dr. Greene.      Level of Medical Decision Making:   - At least 2 stable chronic problems  - Engaged in prescription drug management during visit (discussed any medication benefits, side effects, alternatives, etc.)

## 2023-03-14 NOTE — Clinical Note
Vyvanse refill future-dated. IR dated for now. I messaged patient about the 90-day supply and we can change back to 30 if insurance or pharmacy can't fill.

## 2023-03-14 NOTE — PATIENT INSTRUCTIONS
**For crisis resources, please see the information at the end of this document**   Patient Education    Thank you for coming to the [facility] [place] [facility].     Lab Testing:  If you had lab testing today and your results are reassuring or normal they will be mailed to you or sent through [employer] within 7 days. If the lab tests need quick action we will call you with the results. The phone number we will call with results is # [phone]. If this is not the best number please call our clinic and change the number.     Medication Refills:  If you need any refills please call your pharmacy and they will contact us. Our fax number for refills is [phone].   Three business days of notice are needed for general medication refill requests.   Five business days of notice are needed for controlled substance refill requests.   If you need to change to a different pharmacy, please contact the new pharmacy directly. The new pharmacy will help you get your medications transferred.     Contact Us:  Please call [phone] during business hours (8-5:00 M-F).   If you have medication related questions after clinic hours, or on the weekend, please call [phone].     Financial Assistance [phone]   Medical Records [phone]       MENTAL HEALTH CRISIS RESOURCES:  For a emergency help, please call 911 or go to the nearest Emergency Department.     Emergency Walk-In Options:   EmPATH Unit @ [facility] ([place]): [phone] - Specialized mental health emergency area designed to be calming  [facility] ([place]): [phone]  [facility] ([place]): [phone]  [facility]): [phone]    [facility] Crisis Information:   [place]: [phone]  [place]: [phone]  [place] ([place] - Adult: [phone]     Child: [phone]  [place] - Adult: [phone]     Child: [phone]  [place]:  [phone]  List of all [facility] resources:   https://[place].gov/dhs/people-we-serve/adults/health-care/mental-health/resources/crisis-contacts.jsp    National Crisis Information:   Crisis Text Line: Text  [place]  to 565293  Suicide & Crisis Lifeline: 988  National Suicide Prevention Lifeline: 1-605-242-TALK (1-443.408.9404)       For online chat options, visit https://suicidepreventionlifeline.org/chat/  Poison Control Center: 0-323-322-9784  Trans Lifeline: 2-429-739-0645 - Hotline for transgender people of all ages  The [employer] Project: 5-818-532-3714 - Hotline for LGBT youth     For Non-Emergency Support:   Fast Tracker: Mental Health & Substance Use Disorder Resources -   https://www.[facility].org/

## 2023-04-03 ASSESSMENT — ENCOUNTER SYMPTOMS
DYSURIA: 0
DIZZINESS: 0
FREQUENCY: 0
MYALGIAS: 0
NERVOUS/ANXIOUS: 0
PARESTHESIAS: 0
HEMATOCHEZIA: 0
NAUSEA: 0
COUGH: 0
SHORTNESS OF BREATH: 0
FEVER: 0
DIARRHEA: 0
CONSTIPATION: 0
WEAKNESS: 0
ABDOMINAL PAIN: 0
JOINT SWELLING: 0
HEADACHES: 0
BREAST MASS: 0
HEARTBURN: 0
SORE THROAT: 0
HEMATURIA: 0
EYE PAIN: 0
ARTHRALGIAS: 0
PALPITATIONS: 0
CHILLS: 0

## 2023-04-04 ENCOUNTER — OFFICE VISIT (OUTPATIENT)
Dept: FAMILY MEDICINE | Facility: CLINIC | Age: 43
End: 2023-04-04
Payer: COMMERCIAL

## 2023-04-04 ENCOUNTER — MYC MEDICAL ADVICE (OUTPATIENT)
Dept: PSYCHIATRY | Facility: CLINIC | Age: 43
End: 2023-04-04

## 2023-04-04 VITALS
SYSTOLIC BLOOD PRESSURE: 111 MMHG | DIASTOLIC BLOOD PRESSURE: 66 MMHG | TEMPERATURE: 98.7 F | BODY MASS INDEX: 24.68 KG/M2 | OXYGEN SATURATION: 98 % | RESPIRATION RATE: 12 BRPM | HEART RATE: 72 BPM | HEIGHT: 65 IN | WEIGHT: 148.13 LBS

## 2023-04-04 DIAGNOSIS — Z13.1 ENCOUNTER FOR SCREENING EXAMINATION FOR IMPAIRED GLUCOSE REGULATION AND DIABETES MELLITUS: ICD-10-CM

## 2023-04-04 DIAGNOSIS — Z00.00 ANNUAL PHYSICAL EXAM: Primary | ICD-10-CM

## 2023-04-04 DIAGNOSIS — N92.1 METRORRHAGIA: ICD-10-CM

## 2023-04-04 DIAGNOSIS — F90.0 ADHD (ATTENTION DEFICIT HYPERACTIVITY DISORDER), INATTENTIVE TYPE: ICD-10-CM

## 2023-04-04 DIAGNOSIS — R53.83 OTHER FATIGUE: ICD-10-CM

## 2023-04-04 DIAGNOSIS — F32.9 MAJOR DEPRESSIVE DISORDER, REMISSION STATUS UNSPECIFIED, UNSPECIFIED WHETHER RECURRENT: Primary | ICD-10-CM

## 2023-04-04 DIAGNOSIS — Z13.220 LIPID SCREENING: ICD-10-CM

## 2023-04-04 DIAGNOSIS — R79.89 ELEVATED FERRITIN: ICD-10-CM

## 2023-04-04 DIAGNOSIS — Z30.41 ENCOUNTER FOR SURVEILLANCE OF CONTRACEPTIVE PILLS: ICD-10-CM

## 2023-04-04 DIAGNOSIS — F32.9 MAJOR DEPRESSIVE DISORDER, REMISSION STATUS UNSPECIFIED, UNSPECIFIED WHETHER RECURRENT: ICD-10-CM

## 2023-04-04 DIAGNOSIS — F33.42 MAJOR DEPRESSIVE DISORDER, RECURRENT, IN FULL REMISSION (H): ICD-10-CM

## 2023-04-04 DIAGNOSIS — R53.83 FATIGUE, UNSPECIFIED TYPE: ICD-10-CM

## 2023-04-04 LAB
ALBUMIN SERPL BCG-MCNC: 4.3 G/DL (ref 3.5–5.2)
ALP SERPL-CCNC: 58 U/L (ref 35–104)
ALT SERPL W P-5'-P-CCNC: 12 U/L (ref 10–35)
ANION GAP SERPL CALCULATED.3IONS-SCNC: 11 MMOL/L (ref 7–15)
AST SERPL W P-5'-P-CCNC: 18 U/L (ref 10–35)
BILIRUB SERPL-MCNC: 0.4 MG/DL
BUN SERPL-MCNC: 12.1 MG/DL (ref 6–20)
CALCIUM SERPL-MCNC: 9.2 MG/DL (ref 8.6–10)
CHLORIDE SERPL-SCNC: 103 MMOL/L (ref 98–107)
CHOLEST SERPL-MCNC: 159 MG/DL
CREAT SERPL-MCNC: 0.91 MG/DL (ref 0.51–0.95)
DEPRECATED HCO3 PLAS-SCNC: 24 MMOL/L (ref 22–29)
ERYTHROCYTE [DISTWIDTH] IN BLOOD BY AUTOMATED COUNT: 12.8 % (ref 10–15)
FERRITIN SERPL-MCNC: 324 NG/ML (ref 6–175)
GFR SERPL CREATININE-BSD FRML MDRD: 80 ML/MIN/1.73M2
GLUCOSE SERPL-MCNC: 91 MG/DL (ref 70–99)
HCT VFR BLD AUTO: 37.8 % (ref 35–47)
HDLC SERPL-MCNC: 59 MG/DL
HGB BLD-MCNC: 12.6 G/DL (ref 11.7–15.7)
LDLC SERPL CALC-MCNC: 87 MG/DL
MCH RBC QN AUTO: 29.6 PG (ref 26.5–33)
MCHC RBC AUTO-ENTMCNC: 33.3 G/DL (ref 31.5–36.5)
MCV RBC AUTO: 89 FL (ref 78–100)
NONHDLC SERPL-MCNC: 100 MG/DL
PLATELET # BLD AUTO: 234 10E3/UL (ref 150–450)
POTASSIUM SERPL-SCNC: 4.1 MMOL/L (ref 3.4–5.3)
PROT SERPL-MCNC: 7.1 G/DL (ref 6.4–8.3)
RBC # BLD AUTO: 4.25 10E6/UL (ref 3.8–5.2)
SODIUM SERPL-SCNC: 138 MMOL/L (ref 136–145)
TRIGL SERPL-MCNC: 65 MG/DL
TSH SERPL DL<=0.005 MIU/L-ACNC: 0.85 UIU/ML (ref 0.3–4.2)
WBC # BLD AUTO: 5.2 10E3/UL (ref 4–11)

## 2023-04-04 PROCEDURE — 86140 C-REACTIVE PROTEIN: CPT | Performed by: FAMILY MEDICINE

## 2023-04-04 PROCEDURE — 99214 OFFICE O/P EST MOD 30 MIN: CPT | Mod: 25 | Performed by: FAMILY MEDICINE

## 2023-04-04 PROCEDURE — 82728 ASSAY OF FERRITIN: CPT | Performed by: FAMILY MEDICINE

## 2023-04-04 PROCEDURE — 84443 ASSAY THYROID STIM HORMONE: CPT | Performed by: FAMILY MEDICINE

## 2023-04-04 PROCEDURE — 80061 LIPID PANEL: CPT | Performed by: FAMILY MEDICINE

## 2023-04-04 PROCEDURE — 82306 VITAMIN D 25 HYDROXY: CPT | Performed by: FAMILY MEDICINE

## 2023-04-04 PROCEDURE — 83540 ASSAY OF IRON: CPT | Performed by: FAMILY MEDICINE

## 2023-04-04 PROCEDURE — 83550 IRON BINDING TEST: CPT | Performed by: FAMILY MEDICINE

## 2023-04-04 PROCEDURE — 36415 COLL VENOUS BLD VENIPUNCTURE: CPT | Performed by: FAMILY MEDICINE

## 2023-04-04 PROCEDURE — 85027 COMPLETE CBC AUTOMATED: CPT | Performed by: FAMILY MEDICINE

## 2023-04-04 PROCEDURE — 82607 VITAMIN B-12: CPT | Performed by: FAMILY MEDICINE

## 2023-04-04 PROCEDURE — 99396 PREV VISIT EST AGE 40-64: CPT | Performed by: FAMILY MEDICINE

## 2023-04-04 PROCEDURE — 80053 COMPREHEN METABOLIC PANEL: CPT | Performed by: FAMILY MEDICINE

## 2023-04-04 PROCEDURE — 83921 ORGANIC ACID SINGLE QUANT: CPT | Performed by: FAMILY MEDICINE

## 2023-04-04 RX ORDER — NORETHINDRONE AND ETHINYL ESTRADIOL 1 MG-35MCG
KIT ORAL
Qty: 112 TABLET | Refills: 4 | Status: SHIPPED | OUTPATIENT
Start: 2023-04-04 | End: 2024-04-12

## 2023-04-04 ASSESSMENT — ENCOUNTER SYMPTOMS
MYALGIAS: 0
ABDOMINAL PAIN: 0
BREAST MASS: 0
PALPITATIONS: 0
WEAKNESS: 0
COUGH: 0
HEMATURIA: 0
CHILLS: 0
SORE THROAT: 0
FREQUENCY: 0
DYSURIA: 0
EYE PAIN: 0
JOINT SWELLING: 0
ARTHRALGIAS: 0
PARESTHESIAS: 0
NAUSEA: 0
FEVER: 0
CONSTIPATION: 0
DIZZINESS: 0
SHORTNESS OF BREATH: 0
NERVOUS/ANXIOUS: 0
HEMATOCHEZIA: 0
DIARRHEA: 0
HEARTBURN: 0
HEADACHES: 0

## 2023-04-04 ASSESSMENT — ANXIETY QUESTIONNAIRES
1. FEELING NERVOUS, ANXIOUS, OR ON EDGE: SEVERAL DAYS
GAD7 TOTAL SCORE: 4
6. BECOMING EASILY ANNOYED OR IRRITABLE: MORE THAN HALF THE DAYS
2. NOT BEING ABLE TO STOP OR CONTROL WORRYING: NOT AT ALL
IF YOU CHECKED OFF ANY PROBLEMS ON THIS QUESTIONNAIRE, HOW DIFFICULT HAVE THESE PROBLEMS MADE IT FOR YOU TO DO YOUR WORK, TAKE CARE OF THINGS AT HOME, OR GET ALONG WITH OTHER PEOPLE: SOMEWHAT DIFFICULT
GAD7 TOTAL SCORE: 4
7. FEELING AFRAID AS IF SOMETHING AWFUL MIGHT HAPPEN: NOT AT ALL
3. WORRYING TOO MUCH ABOUT DIFFERENT THINGS: NOT AT ALL
5. BEING SO RESTLESS THAT IT IS HARD TO SIT STILL: NOT AT ALL

## 2023-04-04 ASSESSMENT — PATIENT HEALTH QUESTIONNAIRE - PHQ9
5. POOR APPETITE OR OVEREATING: SEVERAL DAYS
SUM OF ALL RESPONSES TO PHQ QUESTIONS 1-9: 8

## 2023-04-04 NOTE — PROGRESS NOTES
SUBJECTIVE:   CC: Saba is an 42 year old who presents for preventive health visit.       4/4/2023    10:48 AM   Additional Questions   Roomed by Chelly BERNAL CMA   Accompanied by Self         4/4/2023    10:48 AM   Patient Reported Additional Medications   Patient reports taking the following new medications NA   Patient has been advised of split billing requirements and indicates understanding: Yes  Healthy Habits:     Getting at least 3 servings of Calcium per day:  Yes    Bi-annual eye exam:  NO    Dental care twice a year:  Yes    Sleep apnea or symptoms of sleep apnea:  None    Diet:  Regular (no restrictions)    Frequency of exercise:  2-3 days/week    Taking medications regularly:  Yes    Medication side effects:  None    PHQ-2 Total Score: 1    Additional concerns today:  No      Today's PHQ-2 Score:       4/3/2023    10:01 AM   PHQ-2 ( 1999 [employer])   Q1: Little interest or pleasure in doing things 1   Q2: Feeling down, depressed or hopeless 0   PHQ-2 Score 1   Q1: Little interest or pleasure in doing things Several days   Q2: Feeling down, depressed or hopeless Not at all   PHQ-2 Score 1           9/23/2022     9:40 AM 12/20/2022     8:49 AM 3/14/2023     8:47 AM   PHQ   PHQ-9 Total Score 1 2 2   Q9: Thoughts of better off dead/self-harm past 2 weeks Not at all Not at all Not at all         5/19/2021     6:11 PM 1/14/2022     5:13 PM 3/31/2022    12:39 PM   NAYELY-7 SCORE   Total Score 4 (minimal anxiety)     Total Score 4 2 3      FATIGUE: Perpetually exhausted. Not sure if ADHD meds aren't working or worsening depression. When everything was working well, felt motivated to get things done and mood was good. Now back to dreading getting things done. When kids get home at 2, ready to sit. 6pm shows up and ready to be alone.   PHQ9: 8/27  GAD7: 4/21    Sleeps well. Going to bed 9:30-10:00pm, waking up at 6:20. Waking up refreshed.   Walking for exercise.       Social History     Tobacco Use     Smoking  status: Never     Smokeless tobacco: Never   Vaping Use     Vaping status: Never Used   Substance Use Topics     Alcohol use: Yes             4/3/2023    10:01 AM   Alcohol Use   Prescreen: >3 drinks/day or >7 drinks/week? No     Reviewed orders with patient.  Reviewed health maintenance and updated orders accordingly - Yes  - Hep B: low risk?      Breast Cancer Screenin/4/2021    12:14 PM   Breast CA Risk Assessment (FHS-7)   Do you have a family history of breast, colon, or ovarian cancer? No / Unknown       Mammogram Screening - Offered annual screening and updated Health Maintenance for mutual plan based on risk factor consideration    Pertinent mammograms are reviewed under the imaging tab.    History of abnormal Pap smear: NO - age 30-65 PAP every 5 years with negative HPV co-testing recommended      Latest Ref Rng & Units 2020     4:51 PM 8/10/2015     4:50 PM 2006    12:00 AM   PAP / HPV   PAP Negative for squamous intraepithelial lesion or malignancy. Negative for squamous intraepithelial lesion or malignancy  Electronically signed by Nancy Arce CT (ASCP) on 2020 at  3:14 PM     Negative for squamous intraepithelial lesion or malignancy  Electronically signed by Kristy Holder CT (ASCP) on 2015 at  3:44 PM        PAP (Historical)    NIL     HPV 16 DNA NEG Negative       HPV 18 DNA NEG Negative       Other HR HPV NEG Negative         Reviewed and updated as needed this visit by clinical staff   Tobacco  Allergies  Meds              Reviewed and updated as needed this visit by Provider   Tobacco  Allergies  Meds  Problems  Med Hx  Surg Hx  Fam Hx             Review of Systems   Constitutional: Negative for chills and fever.   HENT: Negative for congestion, ear pain, hearing loss and sore throat.    Eyes: Negative for pain and visual disturbance.   Respiratory: Negative for cough and shortness of breath.    Cardiovascular: Negative for chest pain,  "palpitations and peripheral edema.   Gastrointestinal: Negative for abdominal pain, constipation, diarrhea, heartburn, hematochezia and nausea.   Breasts:  Negative for tenderness, breast mass and discharge.   Genitourinary: Negative for dysuria, frequency, genital sores, hematuria, pelvic pain, urgency, vaginal bleeding and vaginal discharge.   Musculoskeletal: Negative for arthralgias, joint swelling and myalgias.   Skin: Negative for rash.   Neurological: Negative for dizziness, weakness, headaches and paresthesias.   Psychiatric/Behavioral: Negative for mood changes. The patient is not nervous/anxious.      See HPI above.      OBJECTIVE:   /66 (BP Location: Left arm, Patient Position: Sitting, Cuff Size: Adult Regular)   Pulse 72   Temp 98.7  F (37.1  C) (Oral)   Resp 12   Ht 1.645 m (5' 4.75\")   Wt 67.2 kg (148 lb 2 oz)   LMP  (LMP Unknown)   SpO2 98%   BMI 24.84 kg/m    Physical Exam  GENERAL: healthy, alert and no distress  EYES: Eyes grossly normal to inspection, PERRL and conjunctivae and sclerae normal  HENT: ear canals and TM's normal, nose and mouth without ulcers or lesions  NECK: no adenopathy, no asymmetry, masses, or scars and thyroid normal to palpation  RESP: lungs clear to auscultation - no rales, rhonchi or wheezes  CV: regular rate and rhythm, normal S1 S2, no S3 or S4, no murmur, click or rub, no peripheral edema and peripheral pulses strong  ABDOMEN: soft, nontender, no hepatosplenomegaly, no masses and bowel sounds normal  MS: no gross musculoskeletal defects noted, no edema  SKIN: no suspicious lesions or rashes  NEURO: Normal strength and tone, mentation intact and speech normal  PSYCH: mentation appears normal, affect normal/bright      ASSESSMENT/PLAN:     1. Annual physical exam  - REVIEW OF HEALTH MAINTENANCE PROTOCOL ORDERS    Reviewed health history and health maintenance recommendations.    2. Fatigue, unspecified type  - Ferritin  - TSH with free T4 reflex  - CBC with " platelets  - Vitamin D Deficiency  - Comprehensive metabolic panel (BMP + Alb, Alk Phos, ALT, AST, Total. Bili, TP)    Saba concerned about fatigue, working with a psychiatrist who recommended reevaluation with primary care.  Menorrhagia managed with OCPs.  Recheck for iron deficiency, thyroid disease, anemia, vitamin D deficiency, metabolic derangements.  Continue to work with mental health team for mood management.  Sleeping well, well refreshed in the morning, low suspicion for sleep apnea.  Try to work on good nutrition, regular physical activity, consider afternoon activity when kids arrive home to get out of the house and get outside.    3. Metrorrhagia  4. Encounter for surveillance of contraceptive pills  - norethindrone-ethinyl estradiol (ALAYCEN 1/35) 1-35 MG-MCG tablet; TAKE 1 TABLET BY MOUTH DAILY USE CONTINUOUSLY TO HAVE MENSES EVERY 3 MONTHS  Dispense: 112 tablet; Refill: 4  - Ferritin  - TSH with free T4 reflex  - CBC with platelets    No contraindication to OCPs, continue current treatment plan.  She is not having any breakthrough bleeding or spotting.  Rule out thyroid disease, anemia, iron deficiency.    5. Major depressive disorder, recurrent, in full remission (H)  Managing with psychiatry, on Celexa.    6. Lipid screening  - Lipid panel reflex to direct LDL Fasting    7. Encounter for screening examination for impaired glucose regulation and diabetes mellitus  - Comprehensive metabolic panel (BMP + Alb, Alk Phos, ALT, AST, Total. Bili, TP)     Patient has been advised of split billing requirements and indicates understanding: Yes      COUNSELING:  Reviewed preventive health counseling, as reflected in patient instructions        She reports that she has never smoked. She has never used smokeless tobacco.      Ofelia Mclain, [facility]

## 2023-04-05 LAB
CRP SERPL-MCNC: <3 MG/L
DEPRECATED CALCIDIOL+CALCIFEROL SERPL-MC: 74 UG/L (ref 20–75)
IRON BINDING CAPACITY (ROCHE): 324 UG/DL (ref 240–430)
IRON SATN MFR SERPL: 37 % (ref 15–46)
IRON SERPL-MCNC: 119 UG/DL (ref 37–145)
VIT B12 SERPL-MCNC: 755 PG/ML (ref 232–1245)

## 2023-04-06 NOTE — RESULT ENCOUNTER NOTE
Patient updated by [employer] message with lab results.       Libardo Walter,  Your lab results have returned and overall are reassuring.  What is interesting is that your ferritin level is elevated. This may be due to excess iron supplementation, but can sometimes be seen with inflammation. I am adding on an inflammatory marker (CRP) to further evaluate. I will send a follow up message when that test returns. Everything else looks good.  Ofelia Mclain, DO

## 2023-04-06 NOTE — RESULT ENCOUNTER NOTE
Patient updated by [employer] message with lab results.       I am also adding on more formal iron studies. I will be in touch when results return.  Ofelia Mclain, DO

## 2023-04-06 NOTE — RESULT ENCOUNTER NOTE
Patient updated by [employer] message with lab results.       Additional labs have promptly returned and are normal. I would stop the iron supplement as it doesn't appear it is needed.  Ofelia Mclain, DO

## 2023-04-11 ENCOUNTER — VIRTUAL VISIT (OUTPATIENT)
Dept: PSYCHIATRY | Facility: CLINIC | Age: 43
End: 2023-04-11
Attending: PSYCHIATRY & NEUROLOGY
Payer: COMMERCIAL

## 2023-04-11 DIAGNOSIS — R53.83 OTHER FATIGUE: ICD-10-CM

## 2023-04-11 DIAGNOSIS — F32.9 MAJOR DEPRESSIVE DISORDER, REMISSION STATUS UNSPECIFIED, UNSPECIFIED WHETHER RECURRENT: ICD-10-CM

## 2023-04-11 DIAGNOSIS — F90.0 ATTENTION DEFICIT HYPERACTIVITY DISORDER (ADHD), PREDOMINANTLY INATTENTIVE TYPE: Primary | ICD-10-CM

## 2023-04-11 LAB — METHYLMALONATE SERPL-SCNC: <0.1 UMOL/L (ref 0–0.4)

## 2023-04-11 PROCEDURE — G0463 HOSPITAL OUTPT CLINIC VISIT: HCPCS | Mod: PN,GT | Performed by: STUDENT IN AN ORGANIZED HEALTH CARE EDUCATION/TRAINING PROGRAM

## 2023-04-11 PROCEDURE — 99214 OFFICE O/P EST MOD 30 MIN: CPT | Mod: VID | Performed by: STUDENT IN AN ORGANIZED HEALTH CARE EDUCATION/TRAINING PROGRAM

## 2023-04-11 PROCEDURE — 90833 PSYTX W PT W E/M 30 MIN: CPT | Mod: VID | Performed by: STUDENT IN AN ORGANIZED HEALTH CARE EDUCATION/TRAINING PROGRAM

## 2023-04-11 RX ORDER — DEXTROAMPHETAMINE SACCHARATE, AMPHETAMINE ASPARTATE, DEXTROAMPHETAMINE SULFATE AND AMPHETAMINE SULFATE 1.25; 1.25; 1.25; 1.25 MG/1; MG/1; MG/1; MG/1
5-10 TABLET ORAL DAILY PRN
Qty: 90 TABLET | Refills: 0 | COMMUNITY
Start: 2023-04-11 | End: 2023-06-08

## 2023-04-11 NOTE — PATIENT INSTRUCTIONS
"It was good checking in today, Saba. We are going up on your afternoon medicine today (increase to 10 mg). I saw this was last filled in March but it looks like like you were able to get the 90-day supply so these won't be due again until June. You will probably run out of the short-acting medicine before then because you will be taking 2 pills instead of 1, so let me know when you are getting low and we'll reorder then.     For better sleep, avoid alcohol before bed. Also, try taking melatonin earlier in the day (perhaps with dinner? Another guideline some people follow is \"12 hours after getting up\" so it ends up being a few hours before going to bed). Lower doses in the 1-3 mg range are sufficient for the circadian-rhythm benefits. If not helpful, can try higher more sedating doses (10mg) at bedtime or adding magnesium at bedtime.     Keep up the good work trying to eat a healthily. Food and movement are two powerful tools in mental health care. Make sure that you are not getting UNDER-nourished while working on the weight loss. I'm including some links below to podcasts talking about which foods best support mental health.     https://www.psychiatrypodcArbor [employer] Technologies.com/phqcbqxrjy-spnzfkxcsufaq-incepgp/2019/9/11/what-foods-are-good-for-mental-health    https://www.psychiatryBenhauer.com/bmhikuxoak-kopkdwulbhtej-iusmamp/episode-131-diet-to-beat-depression-and-anxiety    Will plan on seeing you again in 2 months. Let us know if anything changes between now and then. Best,  Dr Carolann Antony     **For crisis resources, please see the information at the end of this document**   Patient Education    Thank you for coming to the [facility] [place] [facility].     Lab Testing:  If you had lab testing today and your results are reassuring or normal they will be mailed to you or sent through Dreampod within 7 days. If the lab tests need quick action we will call you with the results. The phone number " we will call with results is # [phone]. If this is not the best number please call our clinic and change the number.     Medication Refills:  If you need any refills please call your pharmacy and they will contact us. Our fax number for refills is [phone].   Three business days of notice are needed for general medication refill requests.   Five business days of notice are needed for controlled substance refill requests.   If you need to change to a different pharmacy, please contact the new pharmacy directly. The new pharmacy will help you get your medications transferred.     Contact Us:  Please call [phone] during business hours (8-5:00 M-F).   If you have medication related questions after clinic hours, or on the weekend, please call [phone].     Financial Assistance [phone]   Medical Records [phone]       MENTAL HEALTH CRISIS RESOURCES:  For a emergency help, please call 911 or go to the nearest Emergency Department.     Emergency Walk-In Options:   EmPATH Unit @ [facility] ([place]): [phone] - Specialized mental health emergency area designed to be calming  [facility] ([place]): [phone]  [facility] ([place]): [phone]  [facility] ([place]): [phone]    [facility] Crisis Information:   [place]: [phone]  [place]: [phone]  [place] ([place]) - Adult: [phone]     Child: [phone]  [place] - Adult: [phone]     Child: [phone]  [place]: [phone]  List of all [facility] resources:   https://[place].gov/dhs/people-we-serve/adults/health-care/mental-health/resources/crisis-contacts.jsp    National Crisis Information:   Crisis Text Line: Text  [place]  to 892184  Suicide & Crisis Lifeline: 988  National Suicide Prevention Lifeline: 0-085-470-TALK (1-902.780.6065)       For online chat options, visit https://suicidepreventionlifeline.org/chat/  Poison Control Center: 1-187.534.2489  Trans  Lifeline: 8-175-136-9256 - Hotline for transgender people of all ages  The [employer] Project: 2-711-586-5749 - Hotline for LGBT youth     For Non-Emergency Support:   Fast Tracker: Mental Health & Substance Use Disorder Resources -   https://www.Theravascn.org/

## 2023-04-11 NOTE — PROGRESS NOTES
Video- Visit Details  Type of service:  video visit for medication management  Time of service:    Video Start Time: 8:51   Video End Time: 9:12  Reason for video visit:  Patient convenience   Originating Site (patient location):  [facility] [place]   Location- Patient's home  Distant Site (provider location):  [facility]  Mode of Communication:  Video Conference via MinuteKey       [facility]  Psychiatry Clinic  MEDICAL PROGRESS NOTE     CARE TEAM:  PCP- Ofelia Mclain    Psychotherapist- None (previously saw ADHD )  This person is a 42 year old who uses the name Saba and pronouns she, her.      DIAGNOSIS     Attention deficit hyperactivity disorder, predominantly inattentive presentation  Generalized anxiety disorder  Major depressive disorder, recurrent, full remission     ASSESSMENT     Angelic Marrero is a 42 year old female who carries diagnoses of ADHD, NAYELY, and MDD who reports doing well overall. Recently noticed 2-week-long drop in afternoon energy and motivation that has since spontaneously resolved. Labwork from that time largely unremarkable (did have high ferritin, other inflammatory markers within normal limits, no signs nor other symptoms of infection nor nutritional deficiency, denies possibility of being pregnant), which we reviewed together today. I suspect this was due evening alcohol consumption, possibly occult viral infection, ans/or starting a more restrictive diet.     Though improved back to baseline, does still feel motivation, energy, and focus worsen in the afternoons. No overt mood changes nor anhedonia to suggest depressed episode. We agreed upon increasing her afternoon stimulant to 10 mg today, no other prescription medication changes. I also provided verbal and written education on melatonin usage, stress management, sleep hygiene, and dietary modifications.     No safety concerns today. No other questions nor concerns today.     Future  "considerations  - consider addition of nonstimulant medication such as atomoxetine or guanfacine in lieu of further increasing stimulant should patient exhibit signs of side effects or tolerance (as of Jan 23, guanfacine too sedating)  - avoid bupropion, previously did not tolerate, worsened anxiety   - consider decreasing / discontinuing antidepressant as tolerated given long period of stability   - consider adding therapy (Ramana?)     MNPMP was checked today:  Indicates taking controlled medication as prescribed.     PLAN                                                                                                                1) Meds-   - Continue lisdexamfetamine 50 mg daily  - INCREASE amphetamine-dextroamphetamine 5 mg in the afternoons as-needed, rarely takes, encouraged to increase utilization of this  - Continue citalopram 30 mg daily     OTC - Melatonin prn,  womens multivitamin, calcium supplements, \"happy\" vitamin with vitamin D and saffron, zyrtec, fish oil, Alacen    2) Psychotherapy- none. Declined at 7/15/22 appt    3) Next due-  Labs- n/a  EKG- prn  Rating scales- PHQ9 and NAYELY-7 at every visit    4) Referrals-  none    5) Dispo- rtc 2 months     PERTINENT BACKGROUND                                                    [most recent eval 07/15/22]   Previous diagnoses include generalized anxiety disorder and mild major depressive disorder. Experienced symptoms of anxiety throughout her life, but noticeably worsened after birth of her firstborn. She has gotten past psychiatric care through her primary care provider and did not start any psychiatric medications until she finished nursing her youngest child around 2017. She first experienced depression at that time. Most recently, her 2nd oldest (son) was diagnosed with ADHD and responded well to treatment; noticed similar symptoms in self and pursued ADHD diagnosis which was confirmed (see 07/13/21 progress note by Dr. Janna York). " "     Psych pertinent item history includes mutiple psychotropic trials      SUBJECTIVE   Last seen in clinic 1 month ago, at which time reported feeling well, no medication changes were made. Since last being seen, has messaged with this provider and met with PCP regarding worse fatigue and motivation in the afternoons. Lab results largely unremarkable. Today they report:    Mood:   \"I was gonna say I'm feeling better. I was gonna tell you maybe the fatigue was coming off of spring break and being off on my sleep schedule.\"     This week better. Prior 2 weeks \"I couldn't go like do anything. I just had to go home and be in bed.\"  Spring Break went to [place], had fun.       \"I think I mentioned just feeling really flat.\" and \"I just don't have any emotions.\"     Anxiety, stress - \"nope\"     Social update:   Job hunt \"kind of put it on hold. I'm mostly gonna focus on it when the kids go back to school after the summer.\"    Sleep:   \"pretty good. I will wake up in the night and then I can't fall back to sleep for like 20 minutes to an hour\" 8 hours most night.     Physical Health:   Denies feeling sick. Household \"always a kid with a cough or something.\"    No appetite changes. \"I have been trying to eat better so I joined weight watchers so that was the same time as all the fatigue.\" Tracking food/calories \"not cutting any particular food groups. Just tracking what I'm eating.\"      Still on OCPs? yes  Any recent illness? no  Any chance you could be pregnant? no    Back pain from \"wrestling with my son\"     Substance use:    \"Truly/While Claw before bed that week that I was really tired.\"   Caffeine \"might have a cup a few times a week.\" occasional tea. Limits to AMs     Meds:   Afternoon dose \"only midly helping.\" Taking consistently \"not the last couple days.\" Morning ones \"definitely. I can't live without that one.\"   No longer taking iron.   Stopped taking allergy pill. Takes OCPs. States no chance could " "be pregnant.      Sometimes melatonin before bed around 9:30 taking 5-8 mg    Therapy:   None currently     SI: \"no\" and       FAMILY and SOCIAL HISTORY                                 pt reported   Portions copy-forwarded from previous assessment. 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 House in [place]      Social/ Spiritual Support- , friends, sister, and mom      Feels Safe at Home- yes      PAST MED TRIALS      Medication Max Dose (mg) Dates / Duration Helpful? DC Reason / Adverse Effects?   citalopram 30 mg current Y    adderall 20 mg Current (at 5mg) ? Initially helpful, also too short-acting for lifestyle   vyvanse  50 mg  current Y    bupropion 150 mg? Dec 2019  Worsened anxiety   sertaline 100? 150? 11/17-7/19 Y Lost efficacy over time                    7/12/21: Transfer of care diagnostic assessment.  7/22/21: Started Adderall XR 10 mg daily (recently diagnosed with ADHD, predominantly inattentive presentation).  8/05/21: Increased to Adderall XR 15 mg daily  9/08/21: Added Adderall 5 mg daily PRN and encouraged pt to connect with therapist  10/12/21: Discontinued Adderall XR 15 mg daily and Adderall 5 mg daily PRN. Started lisdexamfetamine 30 mg daily.  1/20/22: Increased lisdexamfetamine to 40 mg daily.   2/10/22: Increased lisdexamfetamine to 50 mg daily.   3/21/22: no changes  7/15/22: transfer of care diagnostic assessment, added 5mg adderall in afternoons as-needed    09/23/22: no changes  12/20/22: added guanfacine XR 1 mg at bedtime   01/17/23: guanfacine discontinued. Encouraged " "to take afternoon prn earlier in the day    03/14/23: increased afternoon IR to 10 mg      MEDICAL HISTORY and ALLERGY       ALLERGIES: Patient has no known allergies.    Patient Active Problem List   Diagnosis     Allergic rhinitis     Other type of migraine     Multiple atypical nevi     Urinary incontinence in female     Mild major depression (H)     Generalized anxiety disorder     Contraception management     Metrorrhagia        MEDICAL REVIEW OF SYSTEMS   Contraception-  Yes   Pregnant- denies 04/11/23     No aches/pain, fevers/chills, GI, headaches, history of seizures.   Periods [id number] on birth control, denies possibility of being pregnant.      MEDICATIONS     Current Outpatient Medications   Medication Sig Dispense Refill     amphetamine-dextroamphetamine (ADDERALL) 5 MG tablet Take 1 tablet (5 mg) by mouth daily as needed (adhd) 90 tablet 0     CALCIUM PO        Cholecalciferol (VITAMIN D3 PO)        citalopram (CELEXA) 20 MG tablet Take 1.5 tablets (30 mg) by mouth daily 135 tablet 0     lisdexamfetamine (VYVANSE) 50 MG capsule Take 1 capsule (50 mg) by mouth every morning 90 capsule 0     Multiple Vitamin (MULTIVITAMIN ADULT PO)        norethindrone-ethinyl estradiol (ALAYCEN 1/35) 1-35 MG-MCG tablet TAKE 1 TABLET BY MOUTH DAILY USE CONTINUOUSLY TO HAVE MENSES EVERY 3 MONTHS 112 tablet 4     Omega-3 1000 MG capsule Take 2 g by mouth       Ferrous Sulfate (IRON PO)  (Patient not taking: Reported on 4/11/2023)       ZYRTEC 10 MG OR TABS 1 TABLET DAILY (Patient not taking: Reported on 4/11/2023) 30 prn      VITALS   LMP  (LMP Unknown)     MENTAL STATUS EXAM     Alertness: alert  and oriented  Appearance: well groomed  Behavior/Demeanor: cooperative, pleasant and calm, with good  eye contact   Speech: regular rate and rhythm  Language: intact  Psychomotor: normal or unremarkable  Mood: \"better\"  Affect: full range and reactive; congruent to: mood- yes, content- yes  Thought Process/Associations: " unremarkable  Thought Content:  Reports none;  Denies suicidal & violent ideation and delusions  Perception:  Reports none;  Denies auditory hallucinations, visual hallucinations and paranoia  Insight: good  Judgment: good  Cognition: adequate for interview  Gait and Station: N/A (telehealth)     LABS and DATA         12/20/2022     8:49 AM 3/14/2023     8:47 AM 4/4/2023    11:46 AM   PHQ   PHQ-9 Total Score 2 2 8   Q9: Thoughts of better off dead/self-harm past 2 weeks Not at all Not at all Not at all       Recent Labs   Lab Test 04/04/23  1123 12/09/21  0925   CR 0.91 0.81   GFRESTIMATED 80 90     Recent Labs   Lab Test 04/04/23  1123 07/19/18  1120   AST 18 20   ALT 12 14   ALKPHOS 58 58     ECG 12/9/21 QTc = 414 ms      PSYCHOTROPIC DRUG INTERACTIONS                                                       PSYCHCLINICDDI   Per [employer]       MANAGEMENT:  Monitoring for adverse effects and patient is aware of risks     RISK STATEMENT for SAFETY     Saba did not appear to be an imminent safety risk to self or others.      TREATMENT RISK STATEMENT: The risks, benefits, alternatives and potential adverse effects have been discussed and are understood by the pt. The pt understands the risks of using street drugs or alcohol. There are no medical contraindications, the pt agrees to treatment with the ability to do so. The pt knows to call the clinic for any problems or to access emergency care if needed.  Medical and substance use concerns are documented above.  Psychotropic drug interaction check was done, including changes made today.     PROVIDER: Carolann Antony MD    Patient not staffed in clinic.  Note will be reviewed and signed by supervisor Dr. Greene.      Level of Medical Decision Making:   - At least 2 stable chronic problems  - Engaged in prescription drug management during visit (discussed any medication benefits, side effects, alternatives, etc.)       Psychiatry Individual Psychotherapy Note    Psychotherapy start time - 8:55  Psychotherapy end time - 9:12  Date last reviewed with patient - 04/11/23  Subjective: This supportive psychotherapy session addressed issues related to goals of therapy and current psychosocial stressors.   Interactive complexity indicated? No  Plan: RTC in timeframe noted above    Psychotherapy services during this visit included myself and the patient.   Treatment Plan      SYMPTOMS; PROBLEMS   MEASURABLE GOALS;    FUNCTIONAL IMPROVEMENT / GAINS INTERVENTIONS DISCHARGE CRITERIA   Depression: low energy, insomnia and concentration problems reduce depressive episodes, learn best practices for sleep, improve nutrition and minimize substance use       ADHD: difficulty paying attention  and forgetful  Psychosocial: parent-child stress and employment stress    Supportive / psychodynamic /     psychoeducation marked symptom improvement

## 2023-04-11 NOTE — PROGRESS NOTES
Virtual Visit Details    Type of service:  Video Visit     Originating Location (pt. Location): Home    Distant Location (provider location):  On-site  Platform used for Video Visit: Va

## 2023-04-11 NOTE — NURSING NOTE
Is the patient currently in the state of [place]? YES    Visit mode:VIDEO    If the visit is dropped, the patient can be reconnected by: VIDEO VISIT: Send to e-mail at: [email or web address]    Will anyone else be joining the visit? NO      How would you like to obtain your AVS? MyChart    Are changes needed to the allergy or medication list? YES: Pt would like the Ferrous Sulfate and Zyrtec 10 mg removed from med list. She is no longer taking these.    Reason for visit: Psychiatry Return    Patient will complete questionnaire prior to joining video.  PHQ not complete per department protocol.    Jaylin Barrios, MAURICIO on 4/11/2023 at 8:39 AM

## 2023-04-27 ENCOUNTER — TRANSFERRED RECORDS (OUTPATIENT)
Dept: HEALTH INFORMATION MANAGEMENT | Facility: CLINIC | Age: 43
End: 2023-04-27
Payer: COMMERCIAL

## 2023-05-03 ENCOUNTER — ANCILLARY PROCEDURE (OUTPATIENT)
Dept: MAMMOGRAPHY | Facility: CLINIC | Age: 43
End: 2023-05-03
Attending: FAMILY MEDICINE
Payer: COMMERCIAL

## 2023-05-03 DIAGNOSIS — Z12.31 VISIT FOR SCREENING MAMMOGRAM: ICD-10-CM

## 2023-05-03 PROCEDURE — 77067 SCR MAMMO BI INCL CAD: CPT

## 2023-06-23 ENCOUNTER — MYC REFILL (OUTPATIENT)
Dept: PSYCHIATRY | Facility: CLINIC | Age: 43
End: 2023-06-23
Payer: COMMERCIAL

## 2023-06-23 DIAGNOSIS — F90.0 ATTENTION DEFICIT HYPERACTIVITY DISORDER (ADHD), PREDOMINANTLY INATTENTIVE TYPE: ICD-10-CM

## 2023-06-23 RX ORDER — LISDEXAMFETAMINE DIMESYLATE 50 MG/1
50 CAPSULE ORAL EVERY MORNING
Qty: 90 CAPSULE | Refills: 0 | Status: SHIPPED | OUTPATIENT
Start: 2023-06-23 | End: 2023-09-20

## 2023-06-23 NOTE — TELEPHONE ENCOUNTER
Last Seen 4/11/23  RTC 2 months  Cancel 1  No-Show 0    Next Appt 6/26/23    Incoming Refill From pt request via MyChart    Medication Requested   lisdexamfetamine (VYVANSE) 50 MG capsule    Directions   Route: Take 1 capsule (50 mg) by mouth every morning - Oral    Qty 90    Last Refill 3/21/23    Medication Refill Pended Per Refill Protocol

## 2023-06-23 NOTE — TELEPHONE ENCOUNTER
Per [place] [facility] lisdexamfetamine (VYVANSE) 50 MG capsule 3/24 #90, 2/21 #30, 1/17 #30    Writer routed to provider for approval.

## 2023-06-26 ENCOUNTER — VIRTUAL VISIT (OUTPATIENT)
Dept: PSYCHIATRY | Facility: CLINIC | Age: 43
End: 2023-06-26
Attending: PSYCHIATRY & NEUROLOGY
Payer: COMMERCIAL

## 2023-06-26 DIAGNOSIS — F41.1 GENERALIZED ANXIETY DISORDER: ICD-10-CM

## 2023-06-26 DIAGNOSIS — F90.0 ATTENTION DEFICIT HYPERACTIVITY DISORDER (ADHD), PREDOMINANTLY INATTENTIVE TYPE: ICD-10-CM

## 2023-06-26 PROCEDURE — 99214 OFFICE O/P EST MOD 30 MIN: CPT | Mod: VID | Performed by: STUDENT IN AN ORGANIZED HEALTH CARE EDUCATION/TRAINING PROGRAM

## 2023-06-26 RX ORDER — CITALOPRAM HYDROBROMIDE 20 MG/1
30 TABLET ORAL DAILY
Qty: 135 TABLET | Refills: 0 | Status: SHIPPED | OUTPATIENT
Start: 2023-06-26 | End: 2023-10-03

## 2023-06-26 RX ORDER — DEXTROAMPHETAMINE SACCHARATE, AMPHETAMINE ASPARTATE, DEXTROAMPHETAMINE SULFATE AND AMPHETAMINE SULFATE 1.25; 1.25; 1.25; 1.25 MG/1; MG/1; MG/1; MG/1
5-10 TABLET ORAL DAILY PRN
Qty: 90 TABLET | Refills: 0 | Status: SHIPPED | OUTPATIENT
Start: 2023-07-18 | End: 2023-10-09

## 2023-06-26 ASSESSMENT — PATIENT HEALTH QUESTIONNAIRE - PHQ9
10. IF YOU CHECKED OFF ANY PROBLEMS, HOW DIFFICULT HAVE THESE PROBLEMS MADE IT FOR YOU TO DO YOUR WORK, TAKE CARE OF THINGS AT HOME, OR GET ALONG WITH OTHER PEOPLE: NOT DIFFICULT AT ALL
SUM OF ALL RESPONSES TO PHQ QUESTIONS 1-9: 1
SUM OF ALL RESPONSES TO PHQ QUESTIONS 1-9: 1

## 2023-06-26 NOTE — NURSING NOTE
Is the patient currently in the state of [place]? YES    Visit mode:VIDEO    If the visit is dropped, the patient can be reconnected by: VIDEO VISIT: Text to cell phone: [phone]    Will anyone else be joining the visit? NO      How would you like to obtain your AVS? MyChart    Are changes needed to the allergy or medication list? NO    Reason for visit: RECHECK    Patient will complete questionnaires prior to joining video.    MAURICIO Orr on 6/26/2023 at 7:56 AM

## 2023-06-26 NOTE — Clinical Note
Shayan Vuong,  Did you mean to give her #90 of the Adderall (or #60).  Please review the order.  Also, is she needing a vyvanse refill as well? Thank you Ally

## 2023-06-26 NOTE — PROGRESS NOTES
Video- Visit Details  Type of service:  video visit for medication management  Time of service:    Video Start Time: 8:12  Video End Time: 8:27  Reason for video visit:  Patient convenience   Originating Site (patient location):  [facility]- [place]   Location- Patient's home  Distant Site (provider location):  [facility]  Mode of Communication:  Video Conference via Tidal[employer]       [facility]  Psychiatry Clinic  MEDICAL PROGRESS NOTE     CARE TEAM:  PCP- Ofelia Mclain    Psychotherapist- None (previously saw ADHD )  This person is a 42 year old who uses the name Saba and pronouns she, her.      DIAGNOSIS     Attention deficit hyperactivity disorder, predominantly inattentive presentation  Generalized anxiety disorder  Major depressive disorder, recurrent, full remission     ASSESSMENT     Angelic Marrero is a 42 year old female who carries diagnoses of ADHD, NAYELY, and MDD who reports doing well overall in the context of new job, reducing alcohol consumption, and recent medication adjustments. Mood continues to do well. Energy, motivation, focus improved since increasing afternoon Adderall at previous appointment.     She hopes to make no medication changes today, which I am in agreement with. Will send refills to her preferred pharmacy today. Encouraged her to follow dentist's recommendations for jaw-clenching.     No safety concerns. No other questions nor concerns. Follow-up in 2-3 months with next resident, which she expressed understanding of.     Future considerations  - consider addition of nonstimulant medication such as atomoxetine or guanfacine in lieu of further increasing stimulant should patient exhibit signs of side effects or tolerance (as of Jan 23, guanfacine too sedating)  - avoid bupropion, previously did not tolerate, worsened anxiety   - consider decreasing / discontinuing antidepressant as tolerated given long period of stability   - consider adding  "therapy (Ramana?)     [place] was [place] today:  Indicates taking controlled medication as prescribed.     PLAN                                                                                                                1) Meds-   - Continue lisdexamfetamine 50 mg daily  - continue amphetamine-dextroamphetamine 10 mg in the afternoons as-needed  - Continue citalopram 30 mg daily     OTC - Melatonin prn,  womens multivitamin, calcium supplements, \"happy\" vitamin with vitamin D and saffron, zyrtec, fish oil, Alacen    2) Psychotherapy- none. Declined at 7/15/22 appt     3) Next due-  Labs- n/a  EKG- none needed currently  Rating scales- PHQ9 and NAYELY-7 at every visit    4) Referrals-  none    5) Dispo- rtc 2 months      PERTINENT BACKGROUND                                                    [most recent eval 07/15/22]   Previous diagnoses include generalized anxiety disorder and mild major depressive disorder. Experienced symptoms of anxiety throughout her life, but noticeably worsened after birth of her firstborn. She has gotten past psychiatric care through her primary care provider and did not start any psychiatric medications until she finished nursing her youngest child around 2017. She first experienced depression at that time. Most recently, her 2nd oldest (son) was diagnosed with ADHD and responded well to treatment; noticed similar symptoms in self and pursued ADHD diagnosis which was confirmed (see 07/13/21 progress note by Dr. Janna York).      Psych pertinent item history includes mutiple psychotropic trials      SUBJECTIVE   Last seen in clinic 2 months ago, at which time afternoon Adderall IR increased to 10 mg. Since last being seen, they report:    Mood:   \"really good. Feeling like the medications are working and everything is going well.\"     Enegy? Fatigue? 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   2 sons in " "baseball.     Sleep:   \"currently I'm really tired.\" up giving son pain medications s/p tonsillectomy.      Stress: good    Physical Health:   \"good. No complaints\"   Energy? Fatigue?, better    Diet \"lost 10 pounds on weight watchers\"      Clenching teeth at night. Dentist recommended mouthguard.  worse in last 6 months to year.     Substance use:   Alcohol use? [place]? - \"I really don't drink much.\"    Cannabis - \"no\"  Caffeine - \"very little. I'll have ice teas.\"      Meds:   \"been good about taking the adderall in the afternoon and that's been working to keep me going the whole day. I haven't had any crashes in the afternoon like I used to. I can keep steady.\"     Denies side effects. States getting more jaw clenching at night, unsure if this is from medication or stress    Therapy: none     SI:  \"no\" SIB, HI \"no\"      FAMILY and SOCIAL HISTORY                                 pt reported   Portions copy-forwarded from previous assessment. Reviewed and updated with patient on 7/15/22    Family Hx: Mother - depression and alcohol use disorder  2/4 children with anxiety and ADHD   Possible anxiety   Brother ADHD     Social Hx:  Financial/ Work- last worked in marketing ([employer]) until 11/2016. 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 House in [place]      Social/ Spiritual Support- , friends, sister, and mom      Feels Safe at Home- yes      PAST MED TRIALS      Medication Max Dose (mg) Dates / Duration Helpful? DC Reason / Adverse Effects?   citalopram 30 mg current Y    adderall 20 mg Current (at 5mg) ? Initially helpful, also too short-acting for lifestyle   vyvanse  50 mg  current Y    bupropion 150 mg? Dec 2019  Worsened anxiety   sertaline 100? 150? 11/17-7/19 Y Lost efficacy over time                    7/12/21: Transfer of care diagnostic " assessment.  7/22/21: Started Adderall XR 10 mg daily (recently diagnosed with ADHD, predominantly inattentive presentation).  8/05/21: Increased to Adderall XR 15 mg daily  9/08/21: Added Adderall 5 mg daily PRN and encouraged pt to connect with therapist  10/12/21: Discontinued Adderall XR 15 mg daily and Adderall 5 mg daily PRN. Started lisdexamfetamine 30 mg daily.  1/20/22: Increased lisdexamfetamine to 40 mg daily.   2/10/22: Increased lisdexamfetamine to 50 mg daily.   3/21/22: no changes  7/15/22: transfer of care diagnostic assessment, added 5mg adderall in afternoons as-needed    09/23/22: no changes  12/20/22: added guanfacine XR 1 mg at bedtime   01/17/23: guanfacine discontinued. Encouraged to take afternoon prn earlier in the day    03/14/23: increased afternoon IR to 10 mg   06/26/23: no changes      MEDICAL HISTORY and ALLERGY       ALLERGIES: Patient has no known allergies.    Patient Active Problem List   Diagnosis     Allergic rhinitis     Other type of migraine     Multiple atypical nevi     Urinary incontinence in female     Mild major depression (H)     Generalized anxiety disorder     Contraception management     Metrorrhagia        MEDICAL REVIEW OF SYSTEMS   Contraception-  Yes   Pregnant- denies 04/11/23     No aches/pain, fevers/chills, GI, headaches, history of seizures.   Periods [id number] on birth control, denies possibility of being pregnant.      MEDICATIONS     Current Outpatient Medications   Medication Sig Dispense Refill     amphetamine-dextroamphetamine (ADDERALL) 5 MG tablet Take 1-2 tablets (5-10 mg) by mouth daily as needed (adhd) 90 tablet 0     CALCIUM PO        Cholecalciferol (VITAMIN D3 PO)        citalopram (CELEXA) 20 MG tablet Take 1.5 tablets (30 mg) by mouth daily 135 tablet 0     lisdexamfetamine (VYVANSE) 50 MG capsule Take 1 capsule (50 mg) by mouth every morning 90 capsule 0     Multiple Vitamin (MULTIVITAMIN ADULT PO)        norethindrone-ethinyl estradiol  "(MIRANDA 1/35) 1-35 MG-MCG tablet TAKE 1 TABLET BY MOUTH DAILY USE CONTINUOUSLY TO HAVE MENSES EVERY 3 MONTHS 112 tablet 4     Omega-3 1000 MG capsule Take 2 g by mouth        VITALS   There were no vitals taken for this visit.    MENTAL STATUS EXAM     Alertness: alert  and oriented  Appearance: well groomed  Behavior/Demeanor: cooperative, pleasant and calm, with good  eye contact   Speech: regular rate and rhythm  Language: intact  Psychomotor: normal or unremarkable  Mood: \"better\"  Affect: full range and reactive; congruent to: mood- yes, content- yes  Thought Process/Associations: unremarkable  Thought Content:  Reports none;  Denies suicidal & violent ideation and delusions  Perception:  Reports none;  Denies auditory hallucinations, visual hallucinations and paranoia  Insight: good  Judgment: good  Cognition: adequate for interview  Gait and Station: N/A (telehealth)     LABS and DATA         3/14/2023     8:47 AM 4/4/2023    11:46 AM 6/26/2023     8:01 AM   PHQ   PHQ-9 Total Score 2 8 1   Q9: Thoughts of better off dead/self-harm past 2 weeks Not at all Not at all Not at all       Recent Labs   Lab Test 04/04/23  1123 12/09/21  0925   CR 0.91 0.81   GFRESTIMATED 80 90     Recent Labs   Lab Test 04/04/23  1123 07/19/18  1120   AST 18 20   ALT 12 14   ALKPHOS 58 58     ECG 12/9/21 QTc = 414 ms      PSYCHOTROPIC DRUG INTERACTIONS                                                       PSYCHCLINICDDI   Per [employer]       MANAGEMENT:  Monitoring for adverse effects and patient is aware of risks     RISK STATEMENT for SAFETY     Saba did not appear to be an imminent safety risk to self or others.      TREATMENT RISK STATEMENT: The risks, benefits, alternatives and potential adverse effects have been discussed and are understood by the pt. The pt understands the risks of using street drugs or alcohol. There are no medical contraindications, the pt agrees to treatment with the ability to do so. The pt knows to " call the clinic for any problems or to access emergency care if needed.  Medical and substance use concerns are documented above.  Psychotropic drug interaction check was done, including changes made today.     PROVIDER: Carolann Antony MD    Patient not staffed in clinic.  Note will be reviewed and signed by supervisor Dr. Greene.      Level of Medical Decision Making:   - At least 2 stable chronic problems  - Engaged in prescription drug management during visit (discussed any medication benefits, side effects, alternatives, etc.)

## 2023-06-26 NOTE — PROGRESS NOTES
Virtual Visit Details    Type of service:  Video Visit       Answers for HPI/ROS submitted by the patient on 6/26/2023  If you checked off any problems, how difficult have these problems made it for you to do your work, take care of things at home, or get along with other people?: Not difficult at all  PHQ9 TOTAL SCORE: 1

## 2023-06-26 NOTE — PATIENT INSTRUCTIONS
**For crisis resources, please see the information at the end of this document**   Patient Education    Thank you for coming to the [facility] [place] [facility].     Lab Testing:  If you had lab testing today and your results are reassuring or normal they will be mailed to you or sent through Qnary within 7 days. If the lab tests need quick action we will call you with the results. The phone number we will call with results is # [phone]. If this is not the best number please call our clinic and change the number.     Medication Refills:  If you need any refills please call your pharmacy and they will contact us. Our fax number for refills is [phone].   Three business days of notice are needed for general medication refill requests.   Five business days of notice are needed for controlled substance refill requests.   If you need to change to a different pharmacy, please contact the new pharmacy directly. The new pharmacy will help you get your medications transferred.     Contact Us:  Please call [phone] during business hours (8-5:00 M-F).   If you have medication related questions after clinic hours, or on the weekend, please call [phone].     Financial Assistance [phone]   Medical Records [phone]       MENTAL HEALTH CRISIS RESOURCES:  For a emergency help, please call 911 or go to the nearest Emergency Department.     Emergency Walk-In Options:   EmPATH Unit @ [facility] ([place]): [phone] - Specialized mental health emergency area designed to be calming  [facility] ([place]): [phone]  [facility] ([place]): [phone]  [facility]): [phone]    [facility] Crisis Information:   [place]: [phone]  [place]: [phone]  [place] ([place]) - Adult: [phone]     Child: [phone]  [place] - Adult: [phone]     Child: [phone]  [place]:  [phone]  List of all [facility] resources:   [place]://[place].gov/dhs/people-we-serve/adults/health-care/mental-health/resources/crisis-contacts.jsp    National Crisis Information:   Crisis Text Line: Text  [place]  to 274280  Suicide & Crisis Lifeline: 988  National Suicide Prevention Lifeline: 2-556-696-TALK (1-446.979.9649)       For online chat options, visit https://suicidepreventionlifeline.org/chat/  Poison Control Center: 6-989-199-6041  Trans Lifeline: 5-647-437-6754 - Hotline for transgender people of all ages  The Jean[employer] Project: 3-765-614-5206 - Hotline for LGBT youth     For Non-Emergency Support:   Fast Tracker: Mental Health & Substance Use Disorder Resources -   https://www.[place] [employer].org/

## 2023-09-20 ENCOUNTER — MYC REFILL (OUTPATIENT)
Dept: PSYCHIATRY | Facility: CLINIC | Age: 43
End: 2023-09-20
Payer: COMMERCIAL

## 2023-09-20 DIAGNOSIS — F90.0 ATTENTION DEFICIT HYPERACTIVITY DISORDER (ADHD), PREDOMINANTLY INATTENTIVE TYPE: ICD-10-CM

## 2023-09-20 NOTE — TELEPHONE ENCOUNTER
Last Seen 6/26  RTC 2 months  Cancel 9/14  No-Show None    Next Appt 10/9    Incoming Refill From patient via MyChart    Medication Requested   lisdexamfetamine (VYVANSE) 50 MG capsule     Directions   Take 1 capsule (50 mg) by mouth every morning     Qty 90    Last Refill Per [place]  6/23 #90, 3/24 #90, 2/20 #30      Medication pended and routed to provider for approval.

## 2023-09-21 RX ORDER — LISDEXAMFETAMINE DIMESYLATE 50 MG/1
50 CAPSULE ORAL EVERY MORNING
Qty: 90 CAPSULE | Refills: 0 | Status: SHIPPED | OUTPATIENT
Start: 2023-09-21 | End: 2023-09-27

## 2023-09-27 DIAGNOSIS — F90.0 ATTENTION DEFICIT HYPERACTIVITY DISORDER (ADHD), PREDOMINANTLY INATTENTIVE TYPE: ICD-10-CM

## 2023-09-27 RX ORDER — LISDEXAMFETAMINE DIMESYLATE 50 MG/1
50 CAPSULE ORAL EVERY MORNING
Qty: 30 CAPSULE | Refills: 0 | Status: SHIPPED | OUTPATIENT
Start: 2023-09-27 | End: 2023-10-09

## 2023-09-27 NOTE — TELEPHONE ENCOUNTER
[facility] Call Center    Phone Message    May a detailed message be left on voicemail: yes     Reason for Call: Medication Refill Request    Has the patient contacted the pharmacy for the refill? Yes   Name of medication being requested: Vyvanse  Provider who prescribed the medication: Dr. Rosas  Pharmacy: [facility] [address][phone]  Date medication is needed: ASAP    Pt found pharmacy with prescription in stock. Says pharmacy only has enough for 30 day supply and needs the order sent for that specifically.    Action Taken: Message routed to:  Other: P PSYCHIATRY NURSE-[facility]    Travel Screening: Not Applicable

## 2023-10-01 DIAGNOSIS — F41.1 GENERALIZED ANXIETY DISORDER: ICD-10-CM

## 2023-10-03 RX ORDER — CITALOPRAM HYDROBROMIDE 20 MG/1
30 TABLET ORAL DAILY
Qty: 45 TABLET | Refills: 0 | Status: SHIPPED | OUTPATIENT
Start: 2023-10-03 | End: 2023-10-09

## 2023-10-03 NOTE — TELEPHONE ENCOUNTER
Medication requested: CITALOPRAM HBR 20 MG TABLET   Last refilled: 6/26/23  Qty: 135      Last seen: 6/26/23  RTC: 2 months  Cancel: x 1 on 9/14/23  No-show: 0  Next appt: 10/9/23    Refill decision:   Refill pended and routed to the provider for review/determination due to   Cancel x 1  Scheduled for follow-up 10/9/23

## 2023-10-08 ASSESSMENT — PATIENT HEALTH QUESTIONNAIRE - PHQ9
10. IF YOU CHECKED OFF ANY PROBLEMS, HOW DIFFICULT HAVE THESE PROBLEMS MADE IT FOR YOU TO DO YOUR WORK, TAKE CARE OF THINGS AT HOME, OR GET ALONG WITH OTHER PEOPLE: NOT DIFFICULT AT ALL
SUM OF ALL RESPONSES TO PHQ QUESTIONS 1-9: 4
SUM OF ALL RESPONSES TO PHQ QUESTIONS 1-9: 4

## 2023-10-09 ENCOUNTER — VIRTUAL VISIT (OUTPATIENT)
Dept: PSYCHIATRY | Facility: CLINIC | Age: 43
End: 2023-10-09
Attending: PSYCHIATRY & NEUROLOGY
Payer: COMMERCIAL

## 2023-10-09 DIAGNOSIS — F90.0 ATTENTION DEFICIT HYPERACTIVITY DISORDER (ADHD), PREDOMINANTLY INATTENTIVE TYPE: ICD-10-CM

## 2023-10-09 DIAGNOSIS — F41.1 GENERALIZED ANXIETY DISORDER: ICD-10-CM

## 2023-10-09 DIAGNOSIS — F33.40 MAJOR DEPRESSIVE DISORDER, RECURRENT EPISODE, IN REMISSION WITH SEASONAL PATTERN (H): Primary | ICD-10-CM

## 2023-10-09 PROCEDURE — 90792 PSYCH DIAG EVAL W/MED SRVCS: CPT | Mod: VID | Performed by: PSYCHIATRY & NEUROLOGY

## 2023-10-09 RX ORDER — CITALOPRAM HYDROBROMIDE 20 MG/1
30 TABLET ORAL DAILY
Qty: 135 TABLET | Refills: 0 | Status: SHIPPED | OUTPATIENT
Start: 2023-10-30 | End: 2024-01-29

## 2023-10-09 RX ORDER — LISDEXAMFETAMINE DIMESYLATE 50 MG/1
50 CAPSULE ORAL EVERY MORNING
Qty: 90 CAPSULE | Refills: 0 | Status: SHIPPED | OUTPATIENT
Start: 2023-10-30 | End: 2023-11-02

## 2023-10-09 RX ORDER — DEXTROAMPHETAMINE SACCHARATE, AMPHETAMINE ASPARTATE, DEXTROAMPHETAMINE SULFATE AND AMPHETAMINE SULFATE 2.5; 2.5; 2.5; 2.5 MG/1; MG/1; MG/1; MG/1
5-10 TABLET ORAL DAILY PRN
Qty: 90 TABLET | Refills: 0 | Status: SHIPPED | OUTPATIENT
Start: 2023-10-09 | End: 2023-11-26

## 2023-10-09 RX ORDER — CITALOPRAM HYDROBROMIDE 20 MG/1
30 TABLET ORAL DAILY
Qty: 45 TABLET | Refills: 2 | Status: SHIPPED | OUTPATIENT
Start: 2023-10-30 | End: 2023-10-09

## 2023-10-09 ASSESSMENT — PAIN SCALES - GENERAL: PAINLEVEL: NO PAIN (0)

## 2023-10-09 NOTE — PATIENT INSTRUCTIONS
It was nice to meet you today. Here is what we discussed:    -Keep taking medications as prescribed    -work to find a balance between responsibilities and fun that fulfills you    Adam Rosas MD  [facility]     **For crisis resources, please see the information at the end of this document**   Patient Education    Thank you for coming to the [facility] [place] [facility].     Lab Testing:  If you had lab testing today and your results are reassuring or normal they will be mailed to you or sent through Emergent Views within 7 days. If the lab tests need quick action we will call you with the results. The phone number we will call with results is # [phone]. If this is not the best number please call our clinic and change the number.     Medication Refills:  If you need any refills please call your pharmacy and they will contact us. Our fax number for refills is [phone].   Three business days of notice are needed for general medication refill requests.   Five business days of notice are needed for controlled substance refill requests.   If you need to change to a different pharmacy, please contact the new pharmacy directly. The new pharmacy will help you get your medications transferred.     Contact Us:  Please call [phone] during business hours (8-5:00 M-F).   If you have medication related questions after clinic hours, or on the weekend, please call [phone].     Financial Assistance [phone]   Medical Records [phone]       MENTAL HEALTH CRISIS RESOURCES:  For a emergency help, please call 911 or go to the nearest Emergency Department.     Emergency Walk-In Options:   EmPATH Unit @ [facility] ([place]): [phone] - Specialized mental health emergency area designed to be calming  [facility] ([place]): [phone]  [facility] ([place]): [phone]  [place]  [facility]): [phone]    [facility] Crisis Information:   [place]: [phone]  [place]: [phone]  [place] TAFO[place]) - Adult: [phone]     Child: [phone]  [place] - Adult: [phone]     Child: [phone]  [place]: [phone]  List of all [facility] resources:   https://[place].[facility]/dhs/people-we-serve/adults/health-care/mental-health/resources/crisis-contacts.jsp    National Crisis Information:   Crisis Text Line: Text  [place]  to 571730  Suicide & Crisis Lifeline: 988  National Suicide Prevention Lifeline: 0-860-039-DGPS (1-468.836.8776)       For online chat options, visit https://suicidepreventionlifeline.org/chat/  Poison Control Center: 1-572.957.1980  Trans Lifeline: 1-302.314.2914 - Hotline for transgender people of all ages  The Jean[employer] Project: 7-389-315-6411 - Hotline for LGBT youth     For Non-Emergency Support:   Fast Tracker: Mental Health & Substance Use Disorder Resources -   https://www.[facility].org/

## 2023-10-09 NOTE — PROGRESS NOTES
Virtual Visit Details    Type of service:  Video Visit     Originating Location (pt. Location): Home    Distant Location (provider location):  On-site  Platform used for Video Visit: Va    Joined the call at 10/9/2023, 2:21:02 pm.  Left the call at 10/9/2023, 2:50:40 pm.  You were on the call for 29 minutes 38 seconds .         [facility]  TRANSFER of CARE DIAGNOSTIC ASSESSMENT     CARE TEAM:    PCP- Ofelia Mclain  Therapist- none (previously saw ADHD )     Saba is a 43 year old who uses the pronouns she, her, hers.      Chief Concern     Med refill      Diagnoses     Attention deficit hyperactivity disorder, predominantly inattentive presentation  Generalized anxiety disorder  Major depressive disorder, recurrent, full remission     Assessment     Angelic Marrero is a 42 year old woman who carries diagnoses of ADHD, NAYELY, and MDD who presents for transfer of visit today.  Overall, Kim reports doing well.  She has found fulfillment at her new job which has allowed her to use her professional skills while still prioritizing time with her kids.  Her medications are working well without concerns.  Saba is still noticing jaw clenching likely related to her stimulant use, and is following with a dentist.  She believes the benefits of the stimulant outweigh this adverse effect and she is using a mouth guard to protect her teeth.  No other adverse effects including irritability or ramp to anxiety.  She has continued to decrease her alcohol consumption, now down to 1-3 drinks twice a week.  No concerns about depression or other mood instability.  Discussed some differences in priorities between her and her  regarding expectations of home projects.  They are continuing to work through this and Saba does not feel she needs a therapist at this time.    Based on Saba's good report, will make no medication changes today.  At  "Saba's request per pharmacy, will send 90 days prescriptions and will follow up in 3 months. Discussed discontinuing citalopram at this time, but Saba reported some lower mood over the winter season. Will discuss discontinuing citalopram in the spring.     Future considerations  - consider decreasing / discontinuing antidepressant as tolerated given long period of stability   - consider addition of nonstimulant medication such as atomoxetine or guanfacine in lieu of further increasing stimulant should patient exhibit signs of side effects or tolerance (as of Jan 23, guanfacine too sedating)  - avoid bupropion, previously did not tolerate, worsened anxiety     Psychotropic Drug Interactions:  [PSYCHCLINICDDI]  ADDITIVE SEROTONERGIC: Amphetamines may enhance the serotonergic effect of Serotonergic Agents (High Risk). This could result in serotonin syndrome.  Management: limit med redundancy and routine monitoring    [place]: indicates that controlled prescriptions have been filled as prescribed all at [facility]    Risk Statements:   Treatment Risk- Risks, benefits, alternatives and potential adverse effects have been discussed and are understood.   Safety Risk-Saba did not appear to be an imminent safety risk to self or others.     Plan     1) Medications:   - Continue lisdexamfetamine 50 mg daily  - continue amphetamine-dextroamphetamine 10 mg in the afternoons as-needed  - Continue citalopram 30 mg daily     OTC - Melatonin prn,  womens multivitamin, calcium supplements, \"happy\" vitamin with vitamin D and saffron, zyrtec, fish oil, Alacen     2) Psychotherapy- none. Not interested      3) Next due-  Labs- n/a  EKG- none needed currently  Rating scales- PHQ9 and NAYELY-7 at every visit     4) Referrals-  none     5) Follow-up: Return to clinic in 3 months      Pertinent Background                                                   [most recent eval 10/09/23]     Previous diagnoses include " generalized anxiety disorder and mild major depressive disorder. Experienced symptoms of anxiety throughout her life, but noticeably worsened after birth of her firstborn. She has gotten past psychiatric care through her primary care provider and did not start any psychiatric medications until she finished nursing her youngest child around 2017. She first experienced depression at that time. Most recently, her 2nd oldest (son) was diagnosed with ADHD and responded well to treatment; noticed similar symptoms in self and pursued ADHD diagnosis which was confirmed (see 07/13/21 progress note by Dr. Janna York).      Psych pertinent item history includes mutiple psychotropic trials      Subjective     Last seen by Dr. Antony on 06/26/2023, at which time things were going well and no medications were changed.     Since then, things have been going really well. Saba reports liking her new job and feels it is a good balance of work and personal life. She's still able to  her kids from school and not work in the evenings. Her ADHD symptoms are well controlled. The second dose of Adderall IR is helpful to get through the rest of her day and she takes it most days. No mood concerns, no SI. She is interested in coming off the citalopram at some point if she thinks it's all been actually related to her ADHD instead of a primary mood disorder.       Recent Psych Symptoms:   Depression:   none  Elevated:  none  Psychosis:  none  Anxiety:   low, some anxiety at times   Trauma Related:  none  Insomnia:  No  Other:  No    Current Social History:  see    Pertinent Substance Use:   Alcohol: Yes: 1-3 drinks, 1-2 a week.    Cannabis: No  Tobacco: No  Caffeine:  No   Opioids: No   Narcan Kit current: N/A  Other substances: none    Medical Review of Systems:   Lightheadedness/orthostasis: None  Headaches: None  Still some jaw clenching, using a mouthguard to prevent teeth from grinding  GI: none  Sexual health concerns:  None    A comprehensive review of systems was performed and is negative other than noted above.    Contraception-  Yes, on birth control continuously   - Would get super nauseous during her periods   - Had some breakthrough bleeding       Mental Status Exam     Alertness: alert  and oriented  Appearance: well groomed  Behavior/Demeanor: cooperative, pleasant, and calm, with good  eye contact   Speech: normal  Language: no problems and good  Psychomotor: normal or unremarkable  Mood: description consistent with euthymia  Affect: full range; congruent to: mood- yes, content- yes  Thought Process/Associations: unremarkable  Thought Content:  Reports none;  Denies suicidal & violent ideation and delusions  Perception:  Reports none;  Denies auditory hallucinations and visual hallucinations  Insight: good  Judgment: excellent  Cognition: does  appear grossly intact; formal cognitive testing was not done  Gait and Station: N/A ([facility])     Social History                                 pt reported     Financial/ Work- 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 She does 20 hours per week, so she's still able to take the kids to school and is done by the time she's back.   Partner/ -   Children- 4 Children, ages 14, 11, 9 & 6        Living situation- Lives in home with  and 4 children. House in [place]      Social/ Spiritual Support- , friends, sister, and mom  Guns in home?- none   Feels safe?- yes     Family Mental Health History                                 pt reported     Mother - depression and alcohol use disorder  2/4 children with anxiety and ADHD   Possible anxiety   Brother ADHD     Past Psychiatric History     SIB- none  Suicide Attempt [#, most recent]- No   Suicidal Ideation Hx- No   Violence/Aggression Hx- No   Psychosis Hx- No   Eating Disorder  Hx- No: currently setting modist weightloss goal   Other- none  Psych Hosp [#, most recent]- No   Commitment- No   TMS/ECT- No   Outpatient Programs - No      Medication Max Dose (mg) Dates / Duration Helpful? DC Reason / Adverse Effects?   citalopram 30 mg current Y     adderall 20 mg Current (at 5mg) ? Initially helpful, also too short-acting for lifestyle   vyvanse  50 mg  current Y     bupropion 150 mg? Dec 2019   Worsened anxiety   sertaline 100? 150? 11/17-7/19 Y Lost efficacy over time                            7/12/21: Transfer of care diagnostic assessment.  7/22/21: Started Adderall XR 10 mg daily (recently diagnosed with ADHD, predominantly inattentive presentation).  8/05/21: Increased to Adderall XR 15 mg daily  9/08/21: Added Adderall 5 mg daily PRN and encouraged pt to connect with therapist  10/12/21: Discontinued Adderall XR 15 mg daily and Adderall 5 mg daily PRN. Started lisdexamfetamine 30 mg daily.  1/20/22: Increased lisdexamfetamine to 40 mg daily.   2/10/22: Increased lisdexamfetamine to 50 mg daily.   3/21/22: no changes  7/15/22: transfer of care diagnostic assessment, added 5mg adderall in afternoons as-needed    09/23/22: no changes  12/20/22: added guanfacine XR 1 mg at bedtime   01/17/23: guanfacine discontinued. Encouraged to take afternoon prn earlier in the day    03/14/23: increased afternoon IR to 10 mg   06/26/23: no changes     SUBSTANCE USE HISTORY   Past Use: Yes: alcohol 1-2 drinks daily during COVID  Treatment [#, most recent]: No   Medical Consequences: No   Legal Consequences: No      Past Psych Med Trials      Medication Max Dose (mg) Dates / Duration Helpful? DC Reason / Adverse Effects?   citalopram 30 mg current Y     adderall 20 mg Current (at 10mg) ? Initially helpful, also too short-acting for lifestyle   vyvanse  50 mg  current Y     bupropion 150 mg? Dec 2019   Worsened anxiety   sertaline 100? 150? 11/17-7/19 Y Lost efficacy over time     Adderall XR                              Vitals   There were no vitals taken for this visit.  Pulse Readings from Last 3 Encounters:   04/04/23 72   01/14/22 96   12/09/21 59     Wt Readings from Last 3 Encounters:   04/04/23 67.2 kg (148 lb 2 oz)   03/31/22 66.2 kg (146 lb)   01/14/22 67.8 kg (149 lb 8 oz)     BP Readings from Last 3 Encounters:   04/04/23 111/66   03/31/22 110/70   01/14/22 120/77        Medical History     ALLERGIES: Patient has no known allergies.    Patient Active Problem List   Diagnosis    Allergic rhinitis    Other type of migraine    Multiple atypical nevi    Urinary incontinence in female    Mild major depression (H24)    Generalized anxiety disorder    Contraception management    Metrorrhagia        Medications     Current Outpatient Medications   Medication Sig Dispense Refill    amphetamine-dextroamphetamine (ADDERALL) 5 MG tablet Take 1-2 tablets (5-10 mg) by mouth daily as needed (for attentional coverage) 90 tablet 0    CALCIUM PO       Cholecalciferol (VITAMIN D3 PO)       citalopram (CELEXA) 20 MG tablet Take 1.5 tablets (30 mg) by mouth daily 45 tablet 0    lisdexamfetamine (VYVANSE) 50 MG capsule Take 1 capsule (50 mg) by mouth every morning 30 capsule 0    Multiple Vitamin (MULTIVITAMIN ADULT PO)       norethindrone-ethinyl estradiol (ALAYCEN 1/35) 1-35 MG-MCG tablet TAKE 1 TABLET BY MOUTH DAILY USE CONTINUOUSLY TO HAVE MENSES EVERY 3 MONTHS 112 tablet 4    Omega-3 1000 MG capsule Take 2 g by mouth          Labs and Data         12/20/2022     8:50 AM 4/11/2023     8:42 AM 10/8/2023     3:11 PM   PROMIS-10 Total Score w/o Sub Scores   PROMIS TOTAL - SUBSCORES 37 32 33          No data to display                  4/4/2023    11:46 AM 6/26/2023     8:01 AM 10/8/2023     3:09 PM   PHQ-9 SCORE   PHQ-9 Total Score MyChart  1 (Minimal depression) 4 (Minimal depression)   PHQ-9 Total Score 8 1 4         1/14/2022     5:13 PM 3/31/2022    12:39 PM 4/4/2023    11:46 AM   NAYELY-7 SCORE   Total Score 2 3 4        Liver/Kidney Function, TSH Metabolic Blood counts   Recent Labs   Lab Test 04/04/23  1123 12/09/21  0925   AST 18  --    ALT 12  --    ALKPHOS 58  --    CR 0.91 0.81     Recent Labs   Lab Test 04/04/23  1123   TSH 0.85    Recent Labs   Lab Test 04/04/23  1123   CHOL 159   TRIG 65   LDL 87   HDL 59     No lab results found.  Recent Labs   Lab Test 04/04/23  1123   GLC 91    Recent Labs   Lab Test 04/04/23  1123   WBC 5.2   HGB 12.6   HCT 37.8   MCV 89              ECG 12/09/21 QTc = 414 ms      PROVIDER: Adam Rosas MD    Level of Medical Decision Making:   - At least 1 chronic problem that is not stable  - Engaged in prescription drug management during visit (discussed any medication benefits, side effects, alternatives, etc.)       Psychiatry Individual Psychotherapy Note   Psychotherapy start time - 2:50 PM  Psychotherapy end time - 3:15 PM  Date treatment plan last reviewed with patient - 10/09/23  Subjective: This supportive psychotherapy session addressed issues related to goals of therapy and current psychosocial stressors. Patient's reaction: Contemplation in the context of mental status appropriate for ambulatory setting.    Interactive complexity indicated? No  Plan: RTC in timeframe noted above  Psychotherapy services during this visit included myself and the patient.   Treatment Plan      SYMPTOMS; PROBLEMS   MEASURABLE GOALS;    FUNCTIONAL IMPROVEMENT / GAINS INTERVENTIONS DISCHARGE CRITERIA   Substance Use: alcohol   ADHD: difficulty with organization and avoids tasks which require prolonged mental effort   learn 2-3 triggers for substance use and decrease alcohol use further; continue with supportive skills to help with ADHD Supportive / psychodynamic symptom resolution     Patient not staffed in clinic.  Note will be reviewed and signed by supervisor Dr. Sanford.

## 2023-10-09 NOTE — NURSING NOTE
Is the patient currently in the state of [place]? YES    Visit mode:VIDEO    If the visit is dropped, the patient can be reconnected by: VIDEO VISIT: Send to e-mail at: [email or web address]    Will anyone else be joining the visit? NO  (If patient encounters technical issues they should call [phone]    How would you like to obtain your AVS? MyChart    Are changes needed to the allergy or medication list? No    Reason for visit: MARIA TERESA PANG

## 2023-11-02 ENCOUNTER — MYC MEDICAL ADVICE (OUTPATIENT)
Dept: PSYCHIATRY | Facility: CLINIC | Age: 43
End: 2023-11-02
Payer: COMMERCIAL

## 2023-11-02 DIAGNOSIS — F90.0 ATTENTION DEFICIT HYPERACTIVITY DISORDER (ADHD), PREDOMINANTLY INATTENTIVE TYPE: ICD-10-CM

## 2023-11-02 NOTE — TELEPHONE ENCOUNTER
[place]  lisdexamfetamine (VYVANSE) 50 MG capsule 9/30 #30, 6/23 #90, 3/24 #90    Writer pended medication and routed to provider for approval.

## 2023-11-03 RX ORDER — LISDEXAMFETAMINE DIMESYLATE 50 MG/1
50 CAPSULE ORAL EVERY MORNING
Qty: 90 CAPSULE | Refills: 0 | Status: SHIPPED | OUTPATIENT
Start: 2023-11-03 | End: 2023-11-03

## 2023-11-03 RX ORDER — LISDEXAMFETAMINE DIMESYLATE 50 MG/1
50 CAPSULE ORAL EVERY MORNING
Qty: 30 CAPSULE | Refills: 0 | Status: SHIPPED | OUTPATIENT
Start: 2023-11-03 | End: 2023-11-26

## 2023-11-03 NOTE — TELEPHONE ENCOUNTER
Rx for 30 days pended with old pharmacy attached and routed to Dr. Rosas and the POD for approval.    Called [facility] pharmacy and canceled Rx with this location.    Laura Dueñas RN on 11/3/2023 at 12:48 PM

## 2023-11-03 NOTE — TELEPHONE ENCOUNTER
Writer placed a phone call to [facility] in [place] and spoke with pharmacist who agreed to cancel the Rx at their location.    Laura Dueñas RN on 11/3/2023 at 11:02 AM

## 2023-11-26 ENCOUNTER — MYC REFILL (OUTPATIENT)
Dept: PSYCHIATRY | Facility: CLINIC | Age: 43
End: 2023-11-26
Payer: COMMERCIAL

## 2023-11-26 DIAGNOSIS — F90.0 ATTENTION DEFICIT HYPERACTIVITY DISORDER (ADHD), PREDOMINANTLY INATTENTIVE TYPE: ICD-10-CM

## 2023-11-27 RX ORDER — DEXTROAMPHETAMINE SACCHARATE, AMPHETAMINE ASPARTATE, DEXTROAMPHETAMINE SULFATE AND AMPHETAMINE SULFATE 2.5; 2.5; 2.5; 2.5 MG/1; MG/1; MG/1; MG/1
5-10 TABLET ORAL DAILY PRN
Qty: 30 TABLET | Refills: 0 | Status: SHIPPED | OUTPATIENT
Start: 2023-11-27 | End: 2024-01-29

## 2023-11-27 RX ORDER — LISDEXAMFETAMINE DIMESYLATE 50 MG/1
50 CAPSULE ORAL EVERY MORNING
Qty: 30 CAPSULE | Refills: 0 | Status: SHIPPED | OUTPATIENT
Start: 2024-01-03 | End: 2024-01-09

## 2023-11-27 RX ORDER — DEXTROAMPHETAMINE SACCHARATE, AMPHETAMINE ASPARTATE, DEXTROAMPHETAMINE SULFATE AND AMPHETAMINE SULFATE 2.5; 2.5; 2.5; 2.5 MG/1; MG/1; MG/1; MG/1
5-10 TABLET ORAL DAILY PRN
Qty: 30 TABLET | Refills: 0 | Status: SHIPPED | OUTPATIENT
Start: 2023-12-27 | End: 2024-01-29

## 2023-11-27 RX ORDER — LISDEXAMFETAMINE DIMESYLATE 50 MG/1
50 CAPSULE ORAL EVERY MORNING
Qty: 30 CAPSULE | Refills: 0 | Status: SHIPPED | OUTPATIENT
Start: 2023-12-04 | End: 2024-01-04

## 2023-11-27 NOTE — CONFIDENTIAL NOTE
Rx Refill note     I received rx refill request for lisdexamfetamine (Vyvanse) 50mg, #30, sig 1 cap po qday; and amphetamine-dextroamphetamine 10mg, #30, sig 1/2 to 1 tab po qday prn (for attentional coverage) - as I am a psychiatric prescriber of the day for the clinic today.     Reviewed portions of Angelic Marrero's medical record, including most recent psychiatric progress note by Serena Rosas and Juvenal of 10/9/2023. Reviewed notes in this encounter and appreciated [place]  data. Follow-up with PRICILA Rosas MD, is scheduled for 1/8/2024.     Will order lisdexamfetamine (Vyvanse) 50mg, #30, sig 1 cap po qday; and amphetamine-dextroamphetamine 10mg, #30, sig 1/2 to 1 tab po qday prn (for attentional coverage) .     Julito Sanford MD

## 2023-11-27 NOTE — CONFIDENTIAL NOTE
Rx Refill note     I received rx refill request for lisdexamfetamine (Vyvanse) 50mg, #30, sig 1 cap po qday; and amphetamine-dextroamphetamine 10mg, #30, sig 1/2 to 1 tab po qday prn (for attentional coverage) - as I am a psychiatric prescriber of the day for the clinic today.     Reviewed portions of Angelic Marrero's medical record, including most recent psychiatric progress note by Serena Rosas and Juvenal of 10/9/2023. Reviewed notes in this encounter and appreciated [place]. Follow-up with PRICILA Rosas MD, is scheduled for 1/8/2024.     Will order two sequential prescriptions for: lisdexamfetamine (Vyvanse) 50mg, #30, sig 1 cap po qday; and amphetamine-dextroamphetamine 10mg, #30, sig 1/2 to 1 tab po qday prn (for attentional coverage).     Julito Sanford MD

## 2023-11-27 NOTE — TELEPHONE ENCOUNTER
Writer called [facility] and was informed insurance would not cover 90 days, so the pharmacy only filled #30 and the remainder of the script was canceled. 30 d/s pended and routed to provider for approval.    Laura Dueñas RN on 11/27/2023 at 9:20 AM

## 2023-11-27 NOTE — TELEPHONE ENCOUNTER
Last seen: 10/9/23  RTC: 3 months  Cancel: none  No-show: none  Next appt: 1/8/24     Medication requested: amphetamine-dextroamphetamine (ADDERALL) 10 MG tablet   Directions: Take 0.5-1 tablets (5-10 mg) by mouth daily as needed (for attentional coverage   Qty: 90  Last refilled: 10/21/23 per [place] - this was only for 30 d/s. Will need to call the pharmacy and confirm if they have additional refills     Laura Dueñas RN on 11/27/2023 at 8:49 AM

## 2023-11-27 NOTE — TELEPHONE ENCOUNTER
Last Seen 10/9  RTC 3 months  Cancel None  No-Show None    Next Appt 1/8    Incoming Refill From patient via mychart    Medication Requested   lisdexamfetamine (VYVANSE) 50 MG capsule     Directions   Take 1 capsule (50 mg) by mouth every morning     Qty 30    Last [place]ill Per [place]  11/4 #30, 9/30 #30, 6/23 #90      Medication pended and routed to provider for approval.

## 2023-12-27 ENCOUNTER — MYC MEDICAL ADVICE (OUTPATIENT)
Dept: FAMILY MEDICINE | Facility: CLINIC | Age: 43
End: 2023-12-27
Payer: COMMERCIAL

## 2023-12-27 DIAGNOSIS — Z30.41 ENCOUNTER FOR SURVEILLANCE OF CONTRACEPTIVE PILLS: Primary | ICD-10-CM

## 2024-01-04 ENCOUNTER — MYC REFILL (OUTPATIENT)
Dept: PSYCHIATRY | Facility: CLINIC | Age: 44
End: 2024-01-04
Payer: COMMERCIAL

## 2024-01-04 DIAGNOSIS — F90.0 ATTENTION DEFICIT HYPERACTIVITY DISORDER (ADHD), PREDOMINANTLY INATTENTIVE TYPE: ICD-10-CM

## 2024-01-04 RX ORDER — DEXTROAMPHETAMINE SACCHARATE, AMPHETAMINE ASPARTATE, DEXTROAMPHETAMINE SULFATE AND AMPHETAMINE SULFATE 2.5; 2.5; 2.5; 2.5 MG/1; MG/1; MG/1; MG/1
5-10 TABLET ORAL DAILY PRN
Qty: 30 TABLET | Refills: 0 | Status: CANCELLED | OUTPATIENT
Start: 2024-01-04

## 2024-01-04 NOTE — TELEPHONE ENCOUNTER
Writer spoke with [facility] pharmacy and they confirmed that the patient had refills of both Vyvanse 50 mg and Adderall 10 mg. Pharmacy staff agreed to start the refills on these medications and writer will update patient via [employer].

## 2024-01-05 RX ORDER — LISDEXAMFETAMINE DIMESYLATE 50 MG/1
50 CAPSULE ORAL EVERY MORNING
Qty: 30 CAPSULE | Refills: 0 | Status: SHIPPED | OUTPATIENT
Start: 2024-01-05 | End: 2024-01-29

## 2024-01-08 ENCOUNTER — TELEPHONE (OUTPATIENT)
Dept: PSYCHIATRY | Facility: CLINIC | Age: 44
End: 2024-01-08
Payer: COMMERCIAL

## 2024-01-09 ENCOUNTER — MYC MEDICAL ADVICE (OUTPATIENT)
Dept: PSYCHIATRY | Facility: CLINIC | Age: 44
End: 2024-01-09
Payer: COMMERCIAL

## 2024-01-09 DIAGNOSIS — F90.0 ATTENTION DEFICIT HYPERACTIVITY DISORDER (ADHD), PREDOMINANTLY INATTENTIVE TYPE: ICD-10-CM

## 2024-01-09 RX ORDER — LISDEXAMFETAMINE DIMESYLATE 50 MG/1
50 CAPSULE ORAL EVERY MORNING
Qty: 25 CAPSULE | Refills: 0 | Status: SHIPPED | OUTPATIENT
Start: 2024-01-09 | End: 2024-01-29

## 2024-01-09 NOTE — TELEPHONE ENCOUNTER
- Meds refilled by provider   - Med tab changed to reflect this   - Patient notified via [employer]

## 2024-01-09 NOTE — TELEPHONE ENCOUNTER
Last seen: 1/8  RTC: 3 months   Cancel: 1/9  No-show: none   Next appt: 1/29    Incoming refill from patient via [employer]       Disp Refills Start End [facility]AS    lisdexamfetamine (VYVANSE) 50 MG capsule 30 capsule 0 1/3/2024 -- No   Sig - Route: Take 1 capsule (50 mg) by mouth every morning - Oral   Sent to pharmacy as: Lisdexamfetamine Dimesylate 50 MG Oral Capsule (VYVANSE)   Class: E-Prescribe   Earliest Fill Date: 1/1/2024   Order: [id number]   E-Prescribing Status: Receipt confirmed by pharmacy (11/27/2023 11:43 AM CST)   No prior authorization was found for this prescription.   Found prior authorization for another prescription for the same medication: Closed - Prior Authorization not required for patient/medication   Per  last refilled: 12/4 #30, 11/4 #30, 9/30 #30    Writer did not locate that script from 1/3 was dispensed. Reached out to [facility] and spoke with staff who confirmed patient was dispensed 5 caps on 1/8 and the rest of the script was deactivated. They also confirmed not having the medication in stock as it's on backorder.     Will reach out to provider

## 2024-01-16 DIAGNOSIS — N92.1 METRORRHAGIA: ICD-10-CM

## 2024-01-16 DIAGNOSIS — Z30.41 ENCOUNTER FOR SURVEILLANCE OF CONTRACEPTIVE PILLS: ICD-10-CM

## 2024-01-16 RX ORDER — NORETHINDRONE AND ETHINYL ESTRADIOL 1 MG-35MCG
KIT ORAL
Qty: 112 TABLET | Refills: 4 | OUTPATIENT
Start: 2024-01-16

## 2024-01-29 ENCOUNTER — VIRTUAL VISIT (OUTPATIENT)
Dept: PSYCHIATRY | Facility: CLINIC | Age: 44
End: 2024-01-29
Attending: PSYCHIATRY & NEUROLOGY
Payer: COMMERCIAL

## 2024-01-29 DIAGNOSIS — F41.1 GENERALIZED ANXIETY DISORDER: ICD-10-CM

## 2024-01-29 DIAGNOSIS — F90.2 ATTENTION DEFICIT HYPERACTIVITY DISORDER (ADHD), COMBINED TYPE: ICD-10-CM

## 2024-01-29 DIAGNOSIS — F33.40 MAJOR DEPRESSIVE DISORDER, RECURRENT EPISODE, IN REMISSION WITH SEASONAL PATTERN (H): Primary | ICD-10-CM

## 2024-01-29 PROCEDURE — 99214 OFFICE O/P EST MOD 30 MIN: CPT | Mod: 95 | Performed by: STUDENT IN AN ORGANIZED HEALTH CARE EDUCATION/TRAINING PROGRAM

## 2024-01-29 PROCEDURE — 90833 PSYTX W PT W E/M 30 MIN: CPT | Mod: 95 | Performed by: STUDENT IN AN ORGANIZED HEALTH CARE EDUCATION/TRAINING PROGRAM

## 2024-01-29 RX ORDER — CITALOPRAM HYDROBROMIDE 20 MG/1
30 TABLET ORAL DAILY
Qty: 135 TABLET | Refills: 0 | Status: SHIPPED | OUTPATIENT
Start: 2024-01-29 | End: 2024-02-29

## 2024-01-29 ASSESSMENT — PAIN SCALES - GENERAL: PAINLEVEL: NO PAIN (0)

## 2024-01-29 NOTE — NURSING NOTE
Is the patient currently in the state of [place]? YES    Visit mode:VIDEO    If the visit is dropped, the patient can be reconnected by: VIDEO VISIT: Send to e-mail at: [email or web address]    Will anyone else be joining the visit? NO  (If patient encounters technical issues they should call [phone]    How would you like to obtain your AVS? MyChart    Are changes needed to the allergy or medication list? Pt stated no changes to allergies and Pt stated no med changes    Reason for visit: MARIA TERESA PANG

## 2024-01-29 NOTE — PROGRESS NOTES
Virtual Visit Details    Type of service:  Video Visit     Originating Location (pt. Location): Home    Distant Location (provider location):  On-site  Platform used for Video Visit: [facility]  MEDICAL PROGRESS NOTE     CARE TEAM:    PCP- Ofelia Mclain  Therapist- none (previously saw ADHD )     Saba is a 43 year old who uses the pronouns she, her, hers.      Diagnoses     Attention deficit hyperactivity disorder, predominantly inattentive presentation  Generalized anxiety disorder  Major depressive disorder, recurrent, full remission     Assessment     Angelic Marrero is a 42 year old woman who carries diagnoses of ADHD, NAYELY, and MDD. Overall, she has been doing well for some time with current medication regimen.     Today, Saba reports ongoing stability. Celexa is managing her mood well. Vyvanse and prn Adderall are managing ADHD well. Saba is still noticing jaw clenching likely related to her stimulant use, and is following with a dentist.  She believes the benefits of the stimulant outweigh this adverse effect and she has a mouth guard to protect her teeth.      We also discussed some recent sleep difficulties and various sleep hygiene strategies that may help. She wondered about medication to help with sleep, although we mutually agreed that medications are not indicated at this time.     Based on Saba's good report, will make no medication changes today.  At Saba's request per pharmacy, will send 90 days prescriptions and will follow up in 3 months. Of note, previously discussed potentially discontinuing citalopram in the spring.     Future considerations  - consider decreasing / discontinuing antidepressant as tolerated given long period of stability   - consider addition of nonstimulant medication such as atomoxetine or guanfacine in lieu of further increasing stimulant should patient exhibit signs of side  "effects or tolerance (as of Jan 23, guanfacine too sedating)  - avoid bupropion, previously did not tolerate, worsened anxiety     Psychotropic Drug Interactions:  [PSYCHCLINICDDI]  ADDITIVE SEROTONERGIC: Amphetamines may enhance the serotonergic effect of Serotonergic Agents (High Risk). This could result in serotonin syndrome.  Management: limit med redundancy and routine monitoring    [place] was [place] today: indicates that controlled prescriptions have been filled as prescribed all at [facility]    Risk Statements:   Treatment Risk- Risks, benefits, alternatives and potential adverse effects have been discussed and are understood.   Safety Risk-Saba did not appear to be an imminent safety risk to self or others.     Plan     1) Medications:   - Continue lisdexamfetamine 50 mg daily  - continue amphetamine-dextroamphetamine 10 mg in the afternoons as-needed  - Continue citalopram 30 mg daily     OTC - Melatonin prn,  womens multivitamin, calcium supplements, \"happy\" vitamin with vitamin D and saffron, zyrtec, fish oil, Alacen     2) Psychotherapy- none. Not interested      3) Next due-  Labs- n/a  EKG- none needed currently  Rating scales- PHQ9 and NAYELY-7 at every visit     4) Referrals-  none     5) Follow-up: Return to clinic in 3 months      Pertinent Background                                                   [most recent eval 10/09/23]     Previous diagnoses include generalized anxiety disorder and mild major depressive disorder. Experienced symptoms of anxiety throughout her life, but noticeably worsened after birth of her firstborn. She has gotten past psychiatric care through her primary care provider and did not start any psychiatric medications until she finished nursing her youngest child around 2017. She first experienced depression at that time. Most recently, her 2nd oldest (son) was diagnosed with ADHD and responded well to treatment; noticed similar symptoms in self and pursued ADHD " diagnosis which was confirmed (see 07/13/21 progress note by Dr. Janna York).      Psych pertinent item history includes mutiple psychotropic trials      Subjective     Last seen by Dr. Rosas on 10/9/23, at which time things were going well and no medications were changed.     -Jaw clenching still happening at night. Has custom mouthguard from dentist, but having trouble getting in the habit of wearing it.  -Feels Adderall and Vyvanse are working well. Using Adderall prn about 5 days per week.   -Has noticed more difficulty falling asleep and staying asleep lately. Has had this intermittently for years. Wonders if it's related to different sleep schedules on weekdays and weekends.   -Has been using melatonin off and on without benefit. Discussed sleep hygiene. Often on her phone before bed, ruminating on anxious thoughts at bedtime or when waking up in the middle of the night. Discussed sleep hygiene basics.  -Celexa going well. No mood concerns. No SI.     Recent Psych Symptoms:   Depression:   none  Elevated:  none  Psychosis:  none  Anxiety:   low, some anxiety at times   Trauma Related:  none  Insomnia:  No  Other:  No    Current Social History:  See previous note    Pertinent Substance Use:   Alcohol: Yes: 1-3 drinks, 1-2 a week.    Cannabis: No  Tobacco: No  Caffeine:  No   Opioids: No   Narcan Kit current: N/A  Other substances: none    Medical Review of Systems:   Lightheadedness/orthostasis: None  Headaches: None  Still some jaw clenching, using a mouthguard to prevent teeth from grinding  GI: none  Sexual health concerns: None    A comprehensive review of systems was performed and is negative other than noted above.    Contraception-  Yes, on birth control continuously   - Would get super nauseous during her periods   - Had some breakthrough bleeding       Mental Status Exam     Alertness: alert  and oriented  Appearance: well groomed  Behavior/Demeanor: cooperative, pleasant, and calm, with good   eye contact   Speech: normal  Language: no problems and good  Psychomotor: normal or unremarkable  Mood: description consistent with euthymia  Affect: full range; congruent to: mood- yes, content- yes  Thought Process/Associations: unremarkable  Thought Content:  Reports none;  Denies suicidal & violent ideation and delusions  Perception:  Reports none;  Denies auditory hallucinations and visual hallucinations  Insight: good  Judgment: excellent  Cognition: does  appear grossly intact; formal cognitive testing was not done  Gait and Station: N/A (telehealth)     Past Psych Med Trials      Medication Max Dose (mg) Dates / Duration Helpful? DC Reason / Adverse Effects?   citalopram 30 mg current Y     adderall 20 mg Current (at 10mg) ? Initially helpful, also too short-acting for lifestyle   vyvanse  50 mg  current Y     bupropion 150 mg? Dec 2019   Worsened anxiety   sertaline 100? 150? 11/17-7/19 Y Lost efficacy over time     Adderall XR                             Treatment Course and [place] [employer] since  JULY 2023     None     Vitals   There were no vitals taken for this visit.  Pulse Readings from Last 3 Encounters:   04/04/23 72   01/14/22 96   12/09/21 59     Wt Readings from Last 3 Encounters:   04/04/23 67.2 kg (148 lb 2 oz)   03/31/22 66.2 kg (146 lb)   01/14/22 67.8 kg (149 lb 8 oz)     BP Readings from Last 3 Encounters:   04/04/23 111/66   03/31/22 110/70   01/14/22 120/77        Medical History     ALLERGIES: Patient has no known allergies.    Patient Active Problem List   Diagnosis    Allergic rhinitis    Other type of migraine    Multiple atypical nevi    Urinary incontinence in female    Mild major depression (H24)    Generalized anxiety disorder    Contraception management    Metrorrhagia    Major depressive disorder, recurrent episode, in remission with seasonal pattern (H24)        Medications     Current Outpatient Medications   Medication Sig Dispense Refill    amphetamine-dextroamphetamine  (ADDERALL) 10 MG tablet Take 0.5-1 tablets (5-10 mg) by mouth daily as needed (for attentional coverage) 30 tablet 0    amphetamine-dextroamphetamine (ADDERALL) 10 MG tablet Take 0.5-1 tablets (5-10 mg) by mouth daily as needed (for attentional coverage) 30 tablet 0    CALCIUM PO       Cholecalciferol (VITAMIN D3 PO)       citalopram (CELEXA) 20 MG tablet Take 1.5 tablets (30 mg) by mouth daily 135 tablet 0    lisdexamfetamine (VYVANSE) 50 MG capsule Take 1 capsule (50 mg) by mouth every morning 25 capsule 0    lisdexamfetamine (VYVANSE) 50 MG capsule Take 1 capsule (50 mg) by mouth every morning 30 capsule 0    Multiple Vitamin (MULTIVITAMIN ADULT PO)       norethindrone-ethinyl estradiol (ALAYCEN 1/35) 1-35 MG-MCG tablet TAKE 1 TABLET BY MOUTH DAILY USE CONTINUOUSLY TO HAVE MENSES EVERY 3 MONTHS 112 tablet 4    norethindrone-ethinyl estradiol (ORTHO-NOVUM) 1-35 MG-MCG tablet Take 1 tablet by mouth daily 10 tablet 0    Omega-3 1000 MG capsule Take 2 g by mouth          Labs and Data         4/11/2023     8:42 AM 10/8/2023     3:11 PM 1/29/2024    12:24 PM   PROMIS-10 Total Score w/o Sub Scores   PROMIS TOTAL - SUBSCORES 32 33 36          No data to display                  6/26/2023     8:01 AM 10/8/2023     3:09 PM 1/29/2024    12:22 PM   PHQ-9 SCORE   PHQ-9 Total Score MyChart 1 (Minimal depression) 4 (Minimal depression) 2 (Minimal depression)   PHQ-9 Total Score 1 4 2         1/14/2022     5:13 PM 3/31/2022    12:39 PM 4/4/2023    11:46 AM   NAYELY-7 SCORE   Total Score 2 3 4       Liver/Kidney Function, TSH Metabolic Blood counts   Recent Labs   Lab Test 04/04/23  1123 12/09/21  0925   AST 18  --    ALT 12  --    ALKPHOS 58  --    CR 0.91 0.81     Recent Labs   Lab Test 04/04/23  1123   TSH 0.85    Recent Labs   Lab Test 04/04/23  1123   CHOL 159   TRIG 65   LDL 87   HDL 59     No lab results found.  Recent Labs   Lab Test 04/04/23  1123   GLC 91    Recent Labs   Lab Test 04/04/23  1123   WBC 5.2   HGB 12.6   HCT  37.8   MCV 89              ECG 12/09/21 QTc = 414 ms    PROVIDER: Sabina Cervantes MD    Level of Medical Decision Making:   - At least 1 chronic problem that is not stable  - Engaged in prescription drug management during visit (discussed any medication benefits, side effects, alternatives, etc.)       Psychiatry Individual Psychotherapy Note   Psychotherapy start time - 1:55 PM  Psychotherapy end time - 2:15 PM  Date treatment plan last reviewed with patient - 1/29/24  Subjective: This supportive psychotherapy session addressed issues related to goals of therapy and current psychosocial stressors. Patient's reaction: Contemplation in the context of mental status appropriate for ambulatory setting.    Interactive complexity indicated? No  Plan: RTC in timeframe noted above  Psychotherapy services during this visit included myself and the patient.   Treatment Plan      SYMPTOMS; PROBLEMS   MEASURABLE GOALS;    FUNCTIONAL IMPROVEMENT / GAINS INTERVENTIONS DISCHARGE CRITERIA   Difficulty sleeping Discussed strategies for improved sleep hygiene, CBTi resources  Supportive / psychodynamic symptom resolution     Patient not staffed in clinic.  Note will be reviewed and signed by supervisor Dr. Sanford.

## 2024-01-29 NOTE — PATIENT INSTRUCTIONS
Thank you for your visit today.     Treatment Plan Today:     1) Plan: No medication changes.    2) Follow-up appointment with Dr Rosas in about 3 months.     3) In the case of an emergency, crisis numbers are below and clinic after hours number is [phone].    Sabina Cervantes MD  Psychiatry Resident Physician     **For crisis resources, please see the information at the end of this document**   Patient Education    Thank you for coming to the [facility] [place] [facility].     Lab Testing:  If you had lab testing today and your results are reassuring or normal they will be mailed to you or sent through [employer] within 7 days. If the lab tests need quick action we will call you with the results. The phone number we will call with results is # [phone]. If this is not the best number please call our clinic and change the number.     Medication Refills:  If you need any refills please call your pharmacy and they will contact us. Our fax number for refills is [phone].   Three business days of notice are needed for general medication refill requests.   Five business days of notice are needed for controlled substance refill requests.   If you need to change to a different pharmacy, please contact the new pharmacy directly. The new pharmacy will help you get your medications transferred.     Contact Us:  Please call [phone] during business hours (8-5:00 M-F).   If you have medication related questions after clinic hours, or on the weekend, please call [phone].     Financial Assistance [phone]   Medical Records [phone]       MENTAL HEALTH CRISIS RESOURCES:  For a emergency help, please call 911 or go to the nearest Emergency Department.     Emergency Walk-In Options:   [facility] @ [place] [facility] ([place]): [phone] - Specialized mental health emergency area designed to be calming  [facility] ([place]): [phone]  [facility] [place]  [facility] ([place]): [phone]  [facility] ([place]): [phone]    [facility] Crisis Information:   [place]: [phone]  [place]: [phone]  [place] ([place]) - Adult: [phone]     Child: [phone]  [place] - Adult: [phone]     Child: [phone]  [place]: [phone]  List of all [facility] resources:   [email or web address]-[email or web address]    National Crisis Information:   Crisis Text Line: Text  [place]  to 467360  Suicide & Crisis Lifeline: 988  National Suicide Prevention Lifeline: 7-146-620-TALK (1-878.236.4985)       For online chat options, visit https://suicidepreventionlifeline.org/chat/  Poison Control Center: 1-439.249.8722  Trans Lifeline: 2-536-143-8509 - Hotline for transgender people of all ages  The [employer]: 2-543-725-9830 - Hotline for LGBT youth     For Non-Emergency Support:   Fast Tracker: Mental Health & Substance Use Disorder Resources -   https://www.[employer].org/

## 2024-01-30 RX ORDER — LISDEXAMFETAMINE DIMESYLATE 50 MG/1
50 CAPSULE ORAL EVERY MORNING
Qty: 25 CAPSULE | Refills: 0 | Status: SHIPPED | OUTPATIENT
Start: 2024-01-30 | End: 2024-02-06

## 2024-01-30 RX ORDER — DEXTROAMPHETAMINE SACCHARATE, AMPHETAMINE ASPARTATE, DEXTROAMPHETAMINE SULFATE AND AMPHETAMINE SULFATE 2.5; 2.5; 2.5; 2.5 MG/1; MG/1; MG/1; MG/1
5-10 TABLET ORAL DAILY PRN
Qty: 30 TABLET | Refills: 0 | Status: SHIPPED | OUTPATIENT
Start: 2024-03-29 | End: 2024-07-24

## 2024-01-30 RX ORDER — DEXTROAMPHETAMINE SACCHARATE, AMPHETAMINE ASPARTATE, DEXTROAMPHETAMINE SULFATE AND AMPHETAMINE SULFATE 2.5; 2.5; 2.5; 2.5 MG/1; MG/1; MG/1; MG/1
5-10 TABLET ORAL DAILY PRN
Qty: 30 TABLET | Refills: 0 | Status: SHIPPED | OUTPATIENT
Start: 2024-02-29 | End: 2024-07-09

## 2024-01-30 RX ORDER — LISDEXAMFETAMINE DIMESYLATE 50 MG/1
50 CAPSULE ORAL EVERY MORNING
Qty: 25 CAPSULE | Refills: 0 | Status: SHIPPED | OUTPATIENT
Start: 2024-02-29 | End: 2024-08-20

## 2024-01-30 RX ORDER — DEXTROAMPHETAMINE SACCHARATE, AMPHETAMINE ASPARTATE, DEXTROAMPHETAMINE SULFATE AND AMPHETAMINE SULFATE 2.5; 2.5; 2.5; 2.5 MG/1; MG/1; MG/1; MG/1
5-10 TABLET ORAL DAILY PRN
Qty: 30 TABLET | Refills: 0 | Status: SHIPPED | OUTPATIENT
Start: 2024-01-30 | End: 2024-07-09

## 2024-01-30 RX ORDER — LISDEXAMFETAMINE DIMESYLATE 50 MG/1
50 CAPSULE ORAL EVERY MORNING
Qty: 30 CAPSULE | Refills: 0 | Status: SHIPPED | OUTPATIENT
Start: 2024-03-29 | End: 2024-04-04

## 2024-02-06 ENCOUNTER — MYC MEDICAL ADVICE (OUTPATIENT)
Dept: PSYCHIATRY | Facility: CLINIC | Age: 44
End: 2024-02-06
Payer: COMMERCIAL

## 2024-02-06 DIAGNOSIS — F90.2 ATTENTION DEFICIT HYPERACTIVITY DISORDER (ADHD), COMBINED TYPE: ICD-10-CM

## 2024-02-06 RX ORDER — LISDEXAMFETAMINE DIMESYLATE 50 MG/1
50 CAPSULE ORAL EVERY MORNING
Qty: 30 CAPSULE | Refills: 0 | Status: SHIPPED | OUTPATIENT
Start: 2024-02-06 | End: 2024-07-09

## 2024-02-29 ENCOUNTER — VIRTUAL VISIT (OUTPATIENT)
Dept: PSYCHIATRY | Facility: CLINIC | Age: 44
End: 2024-02-29
Attending: PSYCHIATRY & NEUROLOGY
Payer: COMMERCIAL

## 2024-02-29 DIAGNOSIS — F33.40 MAJOR DEPRESSIVE DISORDER, RECURRENT EPISODE, IN REMISSION WITH SEASONAL PATTERN (H): Primary | ICD-10-CM

## 2024-02-29 DIAGNOSIS — F90.0 ADHD (ATTENTION DEFICIT HYPERACTIVITY DISORDER), INATTENTIVE TYPE: ICD-10-CM

## 2024-02-29 DIAGNOSIS — F41.1 GENERALIZED ANXIETY DISORDER: ICD-10-CM

## 2024-02-29 PROCEDURE — 90833 PSYTX W PT W E/M 30 MIN: CPT | Mod: 95 | Performed by: PSYCHIATRY & NEUROLOGY

## 2024-02-29 PROCEDURE — 99214 OFFICE O/P EST MOD 30 MIN: CPT | Mod: 95 | Performed by: PSYCHIATRY & NEUROLOGY

## 2024-02-29 RX ORDER — CITALOPRAM HYDROBROMIDE 20 MG/1
20 TABLET ORAL DAILY
Refills: 0 | COMMUNITY
Start: 2024-02-29 | End: 2024-07-09

## 2024-02-29 RX ORDER — DULOXETIN HYDROCHLORIDE 30 MG/1
30 CAPSULE, DELAYED RELEASE ORAL DAILY
Qty: 30 CAPSULE | Refills: 1 | Status: SHIPPED | OUTPATIENT
Start: 2024-02-29 | End: 2024-06-11

## 2024-02-29 ASSESSMENT — ANXIETY QUESTIONNAIRES
2. NOT BEING ABLE TO STOP OR CONTROL WORRYING: MORE THAN HALF THE DAYS
IF YOU CHECKED OFF ANY PROBLEMS ON THIS QUESTIONNAIRE, HOW DIFFICULT HAVE THESE PROBLEMS MADE IT FOR YOU TO DO YOUR WORK, TAKE CARE OF THINGS AT HOME, OR GET ALONG WITH OTHER PEOPLE: SOMEWHAT DIFFICULT
7. FEELING AFRAID AS IF SOMETHING AWFUL MIGHT HAPPEN: SEVERAL DAYS
5. BEING SO RESTLESS THAT IT IS HARD TO SIT STILL: SEVERAL DAYS
7. FEELING AFRAID AS IF SOMETHING AWFUL MIGHT HAPPEN: SEVERAL DAYS
GAD7 TOTAL SCORE: 12
4. TROUBLE RELAXING: MORE THAN HALF THE DAYS
6. BECOMING EASILY ANNOYED OR IRRITABLE: MORE THAN HALF THE DAYS
GAD7 TOTAL SCORE: 12
1. FEELING NERVOUS, ANXIOUS, OR ON EDGE: MORE THAN HALF THE DAYS
3. WORRYING TOO MUCH ABOUT DIFFERENT THINGS: MORE THAN HALF THE DAYS
8. IF YOU CHECKED OFF ANY PROBLEMS, HOW DIFFICULT HAVE THESE MADE IT FOR YOU TO DO YOUR WORK, TAKE CARE OF THINGS AT HOME, OR GET ALONG WITH OTHER PEOPLE?: SOMEWHAT DIFFICULT
GAD7 TOTAL SCORE: 12

## 2024-02-29 ASSESSMENT — PATIENT HEALTH QUESTIONNAIRE - PHQ9
10. IF YOU CHECKED OFF ANY PROBLEMS, HOW DIFFICULT HAVE THESE PROBLEMS MADE IT FOR YOU TO DO YOUR WORK, TAKE CARE OF THINGS AT HOME, OR GET ALONG WITH OTHER PEOPLE: SOMEWHAT DIFFICULT
SUM OF ALL RESPONSES TO PHQ QUESTIONS 1-9: 5
SUM OF ALL RESPONSES TO PHQ QUESTIONS 1-9: 5

## 2024-02-29 ASSESSMENT — PAIN SCALES - GENERAL: PAINLEVEL: NO PAIN (0)

## 2024-02-29 NOTE — PROGRESS NOTES
Virtual Visit Details    Type of service:  Video Visit     Originating Location (pt. Location): Home    Distant Location (provider location):  On-site  Platform used for Video Visit: [facility]  MEDICAL PROGRESS NOTE     CARE TEAM:    PCP- Ofelia Mclain  Therapist- none (previously saw ADHD )     Saba is a 43 year old who uses the pronouns she, her, hers.      Diagnoses     Attention deficit hyperactivity disorder, predominantly inattentive presentation  Generalized anxiety disorder  Major depressive disorder, recurrent, full remission     Assessment     Angelic Marrero is a 42 year old woman who carries diagnoses of ADHD, NAYELY, and MDD. Overall, she has been doing well for some time with current medication regimen.     Today, Saba reports ongoing stability. Celexa is managing her mood well. Vyvanse and prn Adderall are managing ADHD well. Saba is still noticing jaw clenching likely related to her stimulant use, and is following with a dentist.  She believes the benefits of the stimulant outweigh this adverse effect and she has a mouth guard to protect her teeth.      We also discussed some recent sleep difficulties and various sleep hygiene strategies that may help. She wondered about medication to help with sleep, although we mutually agreed that medications are not indicated at this time.     Based on Saba's good report, will make no medication changes today.  At Saba's request per pharmacy, will send 90 days prescriptions and will follow up in 3 months. Of note, previously discussed potentially discontinuing citalopram in the spring.     Future considerations  - consider decreasing / discontinuing antidepressant as tolerated given long period of stability   - consider addition of nonstimulant medication such as atomoxetine or guanfacine in lieu of further increasing stimulant should patient exhibit signs of side  "effects or tolerance (as of Jan 23, guanfacine too sedating)  - avoid bupropion, previously did not tolerate, worsened anxiety     Psychotropic Drug Interactions:  [PSYCHCLINICDDI]  ADDITIVE SEROTONERGIC: Amphetamines may enhance the serotonergic effect of Serotonergic Agents (High Risk). This could result in serotonin syndrome.  Management: limit med redundancy and routine monitoring    [place] today: indicates that controlled prescriptions have been filled as prescribed all at [facility]    Risk Statements:   Treatment Risk- Risks, benefits, alternatives and potential adverse effects have been discussed and are understood.   Safety Risk-Saba did not appear to be an imminent safety risk to self or others.     Plan     1) Medications:   - Continue lisdexamfetamine 50 mg daily  - continue amphetamine-dextroamphetamine 10 mg in the afternoons as-needed  - Taper off citalopram 30 mg daily    - 20 mg for 7 days, then discontinue  - Start duloxetine 30 mg daily     OTC - Melatonin prn,  womens multivitamin, calcium supplements, \"happy\" vitamin with vitamin D and saffron, zyrtec, fish oil, Alacen     2) Psychotherapy- none. Not interested      3) Next due-  Labs- n/a  EKG- none needed currently  Rating scales- PHQ9 and NAYELY-7 at every visit     4) Referrals-  none     5) Follow-up: Return to clinic in 6 weeks     Pertinent Background                                                   [most recent eval 10/09/23]     Previous diagnoses include generalized anxiety disorder and mild major depressive disorder. Experienced symptoms of anxiety throughout her life, but noticeably worsened after birth of her firstborn. She has gotten past psychiatric care through her primary care provider and did not start any psychiatric medications until she finished nursing her youngest child around 2017. She first experienced depression at that time. Most recently, her 2nd oldest (son) was diagnosed with ADHD and responded " well to treatment; noticed similar symptoms in self and pursued ADHD diagnosis which was confirmed (see 07/13/21 progress note by Dr. Janna York).      Psych pertinent item history includes mutiple psychotropic trials      Subjective     Last seen by Dr. Cervantes on 1/29/24, at which time things were going well and no medications were changed.     A couple of weeks after seeing Dr. Cervantes, Saba noticed a significant increase in anxiety. This happened all day long, where she felt a tenseness in her body, disrupted sleep, excessive worrying, unable to get calm, chest pressure, lack of hope, but no sense of impending doom. This was in the setting of interviewing for a new job that would be full time.     Regarding mood, she is still is feeling a little low, and doesn't get as much excitement out of life. Overall she's pretty even keel. She's open to trying a a different medication to help support her depression and anxiety.     ADHD medications are going well. No changes requested.     No changes with sleep. Previously discussed sleep hygiene with Dr. Cervantes which was helpful.     Recent Psych Symptoms:   Depression:   none  Elevated:  none  Psychosis:  none  Anxiety:   low, worse recently   Trauma Related:  none  Insomnia:  No  Other:  No    Current Social History:  See previous note    Pertinent Substance Use:   Alcohol: Yes: 1-3 drinks, 1-2 a week (no change)  Cannabis: No  Tobacco: No  Caffeine:  No   Opioids: No   Narcan Kit current: N/A  Other substances: none    Medical Review of Systems:   Lightheadedness/orthostasis: None  Headaches: None  Still some jaw clenching, using a mouthguard to prevent teeth from grinding  GI: none  Sexual health concerns: None    A comprehensive review of systems was performed and is negative other than noted above.    Contraception-  Yes, on birth control continuously   - Would get super nauseated during her periods   - Had some breakthrough bleeding       Mental Status Exam      Alertness: alert  and oriented  Appearance: well groomed  Behavior/Demeanor: cooperative, pleasant, and calm, with good  eye contact   Speech: normal  Language: no problems and good  Psychomotor: normal or unremarkable  Mood: description consistent with euthymia, slight subjective emotional blunting  Affect: full range; congruent to: mood- yes, content- yes  Thought Process/Associations: unremarkable  Thought Content:  Reports none;  Denies suicidal & violent ideation and delusions  Perception:  Reports none;  Denies auditory hallucinations and visual hallucinations  Insight: good  Judgment: excellent  Cognition: does  appear grossly intact; formal cognitive testing was not done  Gait and Station: N/A (telehealth)     Past Psych Med Trials      Medication Max Dose (mg) Dates / Duration Helpful? DC Reason / Adverse Effects?   citalopram 30 mg current Y     adderall 20 mg Current (at 10mg) ? Initially helpful, also too short-acting for lifestyle   vyvanse  50 mg  current Y     bupropion 150 mg? Dec 2019   Worsened anxiety   sertaline 100? 150? 11/17-7/19 Y Lost efficacy over time     Adderall XR                             Treatment Course and [place] Events since  JULY 2023 2/2024: cross taper citalopram 30 mg to duloxetine 30 mg        Vitals   There were no vitals taken for this visit.  Pulse Readings from Last 3 Encounters:   04/04/23 72   01/14/22 96   12/09/21 59     Wt Readings from Last 3 Encounters:   04/04/23 67.2 kg (148 lb 2 oz)   03/31/22 66.2 kg (146 lb)   01/14/22 67.8 kg (149 lb 8 oz)     BP Readings from Last 3 Encounters:   04/04/23 111/66   03/31/22 110/70   01/14/22 120/77        Medical History     ALLERGIES: Patient has no known allergies.    Patient Active Problem List   Diagnosis    Allergic rhinitis    Other type of migraine    Multiple atypical nevi    Urinary incontinence in female    Mild major depression (H24)    Generalized anxiety disorder    Contraception management    Metrorrhagia     Major depressive disorder, recurrent episode, in remission with seasonal pattern (H24)    Attention deficit hyperactivity disorder (ADHD), combined type        Medications     Current Outpatient Medications   Medication Sig Dispense Refill    [START ON 3/29/2024] amphetamine-dextroamphetamine (ADDERALL) 10 MG tablet Take 0.5-1 tablets (5-10 mg) by mouth daily as needed (for attentional coverage) 30 tablet 0    amphetamine-dextroamphetamine (ADDERALL) 10 MG tablet Take 0.5-1 tablets (5-10 mg) by mouth daily as needed (for attentional coverage) 30 tablet 0    amphetamine-dextroamphetamine (ADDERALL) 10 MG tablet Take 0.5-1 tablets (5-10 mg) by mouth daily as needed (for attentional coverage) 30 tablet 0    CALCIUM PO       Cholecalciferol (VITAMIN D3 PO)       citalopram (CELEXA) 20 MG tablet Take 1.5 tablets (30 mg) by mouth daily 135 tablet 0    lisdexamfetamine (VYVANSE) 50 MG capsule Take 1 capsule (50 mg) by mouth every morning 30 capsule 0    [START ON 3/29/2024] lisdexamfetamine (VYVANSE) 50 MG capsule Take 1 capsule (50 mg) by mouth every morning 30 capsule 0    lisdexamfetamine (VYVANSE) 50 MG capsule Take 1 capsule (50 mg) by mouth every morning 25 capsule 0    Multiple Vitamin (MULTIVITAMIN ADULT PO)       norethindrone-ethinyl estradiol (ALAYCEN 1/35) 1-35 MG-MCG tablet TAKE 1 TABLET BY MOUTH DAILY USE CONTINUOUSLY TO HAVE MENSES EVERY 3 MONTHS 112 tablet 4    norethindrone-ethinyl estradiol (ORTHO-NOVUM) 1-35 MG-MCG tablet Take 1 tablet by mouth daily 10 tablet 0    Omega-3 1000 MG capsule Take 2 g by mouth          Labs and Data         4/11/2023     8:42 AM 10/8/2023     3:11 PM 1/29/2024    12:24 PM   PROMIS-10 Total Score w/o Sub Scores   PROMIS TOTAL - SUBSCORES 32 33 36          No data to display                  10/8/2023     3:09 PM 1/29/2024    12:22 PM 2/29/2024     9:14 AM   PHQ-9 SCORE   PHQ-9 Total Score Gauravhart 4 (Minimal depression) 2 (Minimal depression) 5 (Mild depression)   PHQ-9  Total Score 4 2 5         3/31/2022    12:39 PM 4/4/2023    11:46 AM 2/29/2024     9:15 AM   NAYELY-7 SCORE   Total Score   12 (moderate anxiety)   Total Score 3 4 12       Liver/Kidney Function, TSH Metabolic Blood counts   Recent Labs   Lab Test 04/04/23  1123 12/09/21  0925   AST 18  --    ALT 12  --    ALKPHOS 58  --    CR 0.91 0.81     Recent Labs   Lab Test 04/04/23  1123   TSH 0.85    Recent Labs   Lab Test 04/04/23  1123   CHOL 159   TRIG 65   LDL 87   HDL 59     No lab results found.  Recent Labs   Lab Test 04/04/23  1123   GLC 91    Recent Labs   Lab Test 04/04/23  1123   WBC 5.2   HGB 12.6   HCT 37.8   MCV 89              ECG 12/09/21 QTc = 414 ms    PROVIDER: Adam Rosas MD      Level of Medical Decision Making:   - At least 1 chronic problem that is not stable  - Engaged in prescription drug management during visit (discussed any medication benefits, side effects, alternatives, etc.)       Psychiatry Individual Psychotherapy Note   Psychotherapy start time - 2:35 PM  Psychotherapy end time - 2:55 PM  Date treatment plan last reviewed with patient - 1/29/24  Subjective: This supportive psychotherapy session addressed issues related to goals of therapy and current psychosocial stressors. Patient's reaction: Contemplation in the context of mental status appropriate for ambulatory setting.    Interactive complexity indicated? No  Plan: RTC in timeframe noted above  Psychotherapy services during this visit included myself and the patient.   Treatment Plan      SYMPTOMS; PROBLEMS   MEASURABLE GOALS;    FUNCTIONAL IMPROVEMENT / GAINS INTERVENTIONS DISCHARGE CRITERIA   Difficulty sleeping Discussed strategies for improved sleep hygiene, CBTi resources  Supportive / psychodynamic symptom resolution   Dealing with anxiety Using coping skills, working to reduce anxiety inducing        Patient not staffed in clinic.  Note will be reviewed and signed by supervisor Dr. Sanford.

## 2024-02-29 NOTE — PATIENT INSTRUCTIONS
It was nice to meet you today. Here is what we discussed:    -- Taper off citalopram 30 mg daily    - 20 mg (one pill) for 7 days, then discontinue    - Start duloxetine 30 mg daily     Adam Rosas MD  [facility]     **For crisis resources, please see the information at the end of this document**   Patient Education    Thank you for coming to the [facility] [place] [facility].     Lab Testing:  If you had lab testing today and your results are reassuring or normal they will be mailed to you or sent through [employer] within 7 days. If the lab tests need quick action we will call you with the results. The phone number we will call with results is # [phone]. If this is not the best number please call our clinic and change the number.     Medication Refills:  If you need any refills please call your pharmacy and they will contact us. Our fax number for refills is [phone].   Three business days of notice are needed for general medication refill requests.   Five business days of notice are needed for controlled substance refill requests.   If you need to change to a different pharmacy, please contact the new pharmacy directly. The new pharmacy will help you get your medications transferred.     Contact Us:  Please call [phone] during business hours (8-5:00 M-F).   If you have medication related questions after clinic hours, or on the weekend, please call [phone].     Financial Assistance [phone]   Medical Records [phone]       MENTAL HEALTH CRISIS RESOURCES:  For a emergency help, please call 911 or go to the nearest Emergency Department.     Emergency Walk-In Options:   EmPATH Unit @ [facility] ([place]): [phone] - Specialized mental health emergency area designed to be calming  [facility] ([place]): [phone]  [facility] [place] [place] [facility] ([place]):  [phone]  [facility] ([place]): [phone]    [facility] Crisis Information:   [place]: [phone]  [place]: [phone]  [place]) - Adult: [phone]     Child: [phone]  [place] - Adult: [phone]     Child: [phone]  [place]: [phone]  List of all [facility] resources:   [email or web address]/people-[email or web address]/health-care/mental-health/resources/crisis-contacts.jsp    National Crisis Information:   Crisis Text Line: Text  [place]  to 420651  Suicide & Crisis Lifeline: 988  National Suicide Prevention Lifeline: 2-689-129-TALK (1-207.265.5051)       For online chat options, visit https://suicidepreventionlifeline.org/chat/  Poison Control Center: 1-711.588.4643  Trans Lifeline: 4-253-904-8357 - Hotline for transgender people of all ages  The [employer]: 9-971-017-2530 - Hotline for LGBT youth     For Non-Emergency Support:   Fast Tracker: Mental Health & Substance Use Disorder Resources -   https://www.[employer].org/

## 2024-02-29 NOTE — NURSING NOTE
Is the patient currently in the state of [place]? YES    Visit mode:VIDEO    If the visit is dropped, the patient can be reconnected by: VIDEO VISIT: Text to cell phone:   Telephone Information:   Mobile [phone]       Will anyone else be joining the visit? NO  (If patient encounters technical issues they should call [phone]    How would you like to obtain your AVS? MyChart    Are changes needed to the allergy or medication list? No    Reason for visit: RECHECK    Colleen PANG

## 2024-03-05 ENCOUNTER — PATIENT OUTREACH (OUTPATIENT)
Dept: CARE COORDINATION | Facility: CLINIC | Age: 44
End: 2024-03-05
Payer: COMMERCIAL

## 2024-03-11 ENCOUNTER — MYC MEDICAL ADVICE (OUTPATIENT)
Dept: PSYCHIATRY | Facility: CLINIC | Age: 44
End: 2024-03-11
Payer: COMMERCIAL

## 2024-03-11 DIAGNOSIS — F90.2 ATTENTION DEFICIT HYPERACTIVITY DISORDER (ADHD), COMBINED TYPE: Primary | ICD-10-CM

## 2024-03-11 NOTE — TELEPHONE ENCOUNTER
Writer spoke with [facility], they do not have lisdexamfetamine 50 mg in stock (on back order).   Dr Rosas updated.

## 2024-03-12 RX ORDER — DEXTROAMPHETAMINE SACCHARATE, AMPHETAMINE ASPARTATE MONOHYDRATE, DEXTROAMPHETAMINE SULFATE AND AMPHETAMINE SULFATE 5; 5; 5; 5 MG/1; MG/1; MG/1; MG/1
20 CAPSULE, EXTENDED RELEASE ORAL DAILY
Qty: 30 CAPSULE | Refills: 0 | Status: SHIPPED | OUTPATIENT
Start: 2024-03-12 | End: 2024-04-12

## 2024-03-12 NOTE — TELEPHONE ENCOUNTER
Adam Rosas MD  You13 hours ago (8:18 PM)     Bc Brar I missed this earlier today. We could try a couple of things:    1. If we can find 20 mg and 30 mg tablets, we could split the dose  2. If we can only find 30 mg tablet, we can have her try 60 mg. If only 20 mg tablets, then go down to 40 which will be better than nothing.  3. Could temporarily switch back to Adderall XR. We'd be looking for 20 mg tablets of that.    Let me know how she'd like to proceed. I know this is a tough time for her with starting a new job soon.    Adam Rosas MD     Pt updated via [employer].

## 2024-03-12 NOTE — TELEPHONE ENCOUNTER
Writer spoke with [facility], who confirmed they have generic Adderall XR 20 mg in stock.   Dr Rosas updated.

## 2024-03-19 ENCOUNTER — PATIENT OUTREACH (OUTPATIENT)
Dept: CARE COORDINATION | Facility: CLINIC | Age: 44
End: 2024-03-19
Payer: COMMERCIAL

## 2024-03-29 DIAGNOSIS — F33.40 MAJOR DEPRESSIVE DISORDER, RECURRENT EPISODE, IN REMISSION WITH SEASONAL PATTERN (H): ICD-10-CM

## 2024-03-29 DIAGNOSIS — F41.1 GENERALIZED ANXIETY DISORDER: ICD-10-CM

## 2024-03-29 RX ORDER — DULOXETIN HYDROCHLORIDE 30 MG/1
30 CAPSULE, DELAYED RELEASE ORAL DAILY
Qty: 90 CAPSULE | Refills: 1 | OUTPATIENT
Start: 2024-03-29

## 2024-03-29 NOTE — TELEPHONE ENCOUNTER
"Date of Last Office Visit: 2/29/2024  [facility]     Adam Rosas MD     RTC: 6 weeks  No shows: 0  Cancellations: 0  Date of Next Office Visit:   4/11/2024 Status: Abbey    Arrive By: 9:25 AM     Appointment Time: 9:40 AM Length: 30   Visit Type: ADULT PSYCHIATRY RETURN [[id number]] DAMON: [id number]   Provider: Adam Rosas MD Department: [facility]     ------------------------------  DULoxetine (CYMBALTA) 30 MG capsule 30 capsule 1 2/29/2024 -   Sig - Route: Take 1 capsule (30 mg) by mouth daily - Oral  Sent to pharmacy as: DULoxetine HCl 30 MG Oral Capsule Delayed Release Particles (CYMBALTA)  Class: E-Prescribe  ------------------------------     Last Visit Treatment Plan     1) Medications:   - Continue lisdexamfetamine 50 mg daily  - continue amphetamine-dextroamphetamine 10 mg in the afternoons as-needed  - Taper off citalopram 30 mg daily    - 20 mg for 7 days, then discontinue  - Start duloxetine 30 mg daily     OTC - Melatonin prn,  womens multivitamin, calcium supplements, \"happy\" vitamin with vitamin D and saffron, zyrtec, fish oil, Alacen     2) Psychotherapy- none. Not interested      3) Next due-  Labs- n/a  EKG- none needed currently  Rating scales- PHQ9 and NAYELY-7 at every visit     4) Referrals-  none     5) Follow-up: Return to clinic in 6 weeks      Medication unable to be refilled by RN due to criteria not met as indicated.                 []Eligibility - not seen in the last year              []Supervision - no future appointment              []Compliance - no shows, cancellations or lapse in therapy              []Verification - order discrepancy              []Controlled medication              []Medication not included in policy              [x]90-day supply request Refused.              []Other:   "

## 2024-04-04 ENCOUNTER — MYC MEDICAL ADVICE (OUTPATIENT)
Dept: PSYCHIATRY | Facility: CLINIC | Age: 44
End: 2024-04-04
Payer: COMMERCIAL

## 2024-04-04 DIAGNOSIS — F90.2 ATTENTION DEFICIT HYPERACTIVITY DISORDER (ADHD), COMBINED TYPE: ICD-10-CM

## 2024-04-05 RX ORDER — LISDEXAMFETAMINE DIMESYLATE 50 MG/1
50 CAPSULE ORAL EVERY MORNING
Qty: 30 CAPSULE | Refills: 0 | Status: SHIPPED | OUTPATIENT
Start: 2024-04-05 | End: 2024-07-09

## 2024-04-05 NOTE — CONFIDENTIAL NOTE
Rx Refill note     I received rx refill request for lisdexamfetamine (Vyvanse) 50mg, #30, sig 1 cap po qday - as I am a psychiatric prescriber of the day for the clinic today.     Reviewed portions of Angelic Marrero's medical record, including most recent psychiatric progress note by Serena Rosas and Juvenal of 2/29/2024. Reviewed notes in this encounter. Follow-up with Adam Rosas MD, is scheduled for 4/11/2024.     Will sign the order for lisdexamfetamine (Vyvanse) 50mg, #30, sig 1 cap po qday, R-0.     Julito Sanford MD

## 2024-04-12 ENCOUNTER — OFFICE VISIT (OUTPATIENT)
Dept: FAMILY MEDICINE | Facility: CLINIC | Age: 44
End: 2024-04-12
Payer: COMMERCIAL

## 2024-04-12 VITALS
OXYGEN SATURATION: 99 % | WEIGHT: 147.13 LBS | HEIGHT: 64 IN | RESPIRATION RATE: 16 BRPM | SYSTOLIC BLOOD PRESSURE: 122 MMHG | DIASTOLIC BLOOD PRESSURE: 78 MMHG | HEART RATE: 48 BPM | BODY MASS INDEX: 25.12 KG/M2 | TEMPERATURE: 98.1 F

## 2024-04-12 DIAGNOSIS — Z12.31 ENCOUNTER FOR SCREENING MAMMOGRAM FOR BREAST CANCER: ICD-10-CM

## 2024-04-12 DIAGNOSIS — Z00.00 ANNUAL PHYSICAL EXAM: Primary | ICD-10-CM

## 2024-04-12 DIAGNOSIS — N92.1 METRORRHAGIA: ICD-10-CM

## 2024-04-12 DIAGNOSIS — Z13.220 LIPID SCREENING: ICD-10-CM

## 2024-04-12 DIAGNOSIS — Z30.41 ENCOUNTER FOR SURVEILLANCE OF CONTRACEPTIVE PILLS: ICD-10-CM

## 2024-04-12 DIAGNOSIS — Z13.1 SCREENING FOR DIABETES MELLITUS: ICD-10-CM

## 2024-04-12 LAB
ERYTHROCYTE [DISTWIDTH] IN BLOOD BY AUTOMATED COUNT: 12.6 % (ref 10–15)
HCT VFR BLD AUTO: 39.4 % (ref 35–47)
HGB BLD-MCNC: 13 G/DL (ref 11.7–15.7)
MCH RBC QN AUTO: 29.1 PG (ref 26.5–33)
MCHC RBC AUTO-ENTMCNC: 33 G/DL (ref 31.5–36.5)
MCV RBC AUTO: 88 FL (ref 78–100)
PLATELET # BLD AUTO: 277 10E3/UL (ref 150–450)
RBC # BLD AUTO: 4.46 10E6/UL (ref 3.8–5.2)
WBC # BLD AUTO: 6.2 10E3/UL (ref 4–11)

## 2024-04-12 PROCEDURE — 84443 ASSAY THYROID STIM HORMONE: CPT | Performed by: FAMILY MEDICINE

## 2024-04-12 PROCEDURE — 86706 HEP B SURFACE ANTIBODY: CPT | Performed by: FAMILY MEDICINE

## 2024-04-12 PROCEDURE — 85027 COMPLETE CBC AUTOMATED: CPT | Performed by: FAMILY MEDICINE

## 2024-04-12 PROCEDURE — 80061 LIPID PANEL: CPT | Performed by: FAMILY MEDICINE

## 2024-04-12 PROCEDURE — 99396 PREV VISIT EST AGE 40-64: CPT | Performed by: FAMILY MEDICINE

## 2024-04-12 PROCEDURE — 36415 COLL VENOUS BLD VENIPUNCTURE: CPT | Performed by: FAMILY MEDICINE

## 2024-04-12 PROCEDURE — 80053 COMPREHEN METABOLIC PANEL: CPT | Performed by: FAMILY MEDICINE

## 2024-04-12 PROCEDURE — 87340 HEPATITIS B SURFACE AG IA: CPT | Performed by: FAMILY MEDICINE

## 2024-04-12 RX ORDER — NORETHINDRONE AND ETHINYL ESTRADIOL 1 MG-35MCG
KIT ORAL
Qty: 112 TABLET | Refills: 4 | Status: SHIPPED | OUTPATIENT
Start: 2024-04-12

## 2024-04-12 SDOH — HEALTH STABILITY: PHYSICAL HEALTH: ON AVERAGE, HOW MANY DAYS PER WEEK DO YOU ENGAGE IN MODERATE TO STRENUOUS EXERCISE (LIKE A BRISK WALK)?: 4 DAYS

## 2024-04-12 ASSESSMENT — ANXIETY QUESTIONNAIRES
7. FEELING AFRAID AS IF SOMETHING AWFUL MIGHT HAPPEN: NOT AT ALL
5. BEING SO RESTLESS THAT IT IS HARD TO SIT STILL: SEVERAL DAYS
8. IF YOU CHECKED OFF ANY PROBLEMS, HOW DIFFICULT HAVE THESE MADE IT FOR YOU TO DO YOUR WORK, TAKE CARE OF THINGS AT HOME, OR GET ALONG WITH OTHER PEOPLE?: SOMEWHAT DIFFICULT
IF YOU CHECKED OFF ANY PROBLEMS ON THIS QUESTIONNAIRE, HOW DIFFICULT HAVE THESE PROBLEMS MADE IT FOR YOU TO DO YOUR WORK, TAKE CARE OF THINGS AT HOME, OR GET ALONG WITH OTHER PEOPLE: SOMEWHAT DIFFICULT
GAD7 TOTAL SCORE: 9
GAD7 TOTAL SCORE: 9
1. FEELING NERVOUS, ANXIOUS, OR ON EDGE: NEARLY EVERY DAY
2. NOT BEING ABLE TO STOP OR CONTROL WORRYING: SEVERAL DAYS
6. BECOMING EASILY ANNOYED OR IRRITABLE: MORE THAN HALF THE DAYS
4. TROUBLE RELAXING: SEVERAL DAYS
7. FEELING AFRAID AS IF SOMETHING AWFUL MIGHT HAPPEN: NOT AT ALL
3. WORRYING TOO MUCH ABOUT DIFFERENT THINGS: SEVERAL DAYS
GAD7 TOTAL SCORE: 9

## 2024-04-12 ASSESSMENT — PATIENT HEALTH QUESTIONNAIRE - PHQ9
SUM OF ALL RESPONSES TO PHQ QUESTIONS 1-9: 4
10. IF YOU CHECKED OFF ANY PROBLEMS, HOW DIFFICULT HAVE THESE PROBLEMS MADE IT FOR YOU TO DO YOUR WORK, TAKE CARE OF THINGS AT HOME, OR GET ALONG WITH OTHER PEOPLE: SOMEWHAT DIFFICULT
SUM OF ALL RESPONSES TO PHQ QUESTIONS 1-9: 4

## 2024-04-12 ASSESSMENT — SOCIAL DETERMINANTS OF HEALTH (SDOH): HOW OFTEN DO YOU GET TOGETHER WITH FRIENDS OR RELATIVES?: TWICE A WEEK

## 2024-04-12 NOTE — PROGRESS NOTES
"Preventive Care Visit  [facility]  Ofelia Mclain DO, Family Medicine  Apr 12, 2024      Assessment & Plan     1. Annual physical exam  - Hepatitis B Surface Antibody  - Hepatitis B surface antigen    Reviewed health history and health maintenance recommendations.  Unsure if previously vaccinated for hepatitis B.  Will check antibody levels today.    2. Metrorrhagia  3. Encounter for surveillance of contraceptive pills  - norethindrone-ethinyl estradiol (ALAYCEN 1/35) 1-35 MG-MCG tablet; TAKE 1 TABLET BY MOUTH DAILY USE CONTINUOUSLY TO HAVE MENSES EVERY 3 MONTHS  Dispense: 112 tablet; Refill: 4  - TSH with free T4 reflex  - CBC with platelets    Continues combined oral contraceptive pills continuously for management of periods.  Symptoms are well-controlled and desires to continue.  We will recheck thyroid and hemoglobin today.    4. Screening for diabetes mellitus  - Comprehensive metabolic panel (BMP + Alb, Alk Phos, ALT, AST, Total. Bili, TP)    5. Lipid screening  - Lipid panel reflex to direct LDL Non-fasting  - Comprehensive metabolic panel (BMP + Alb, Alk Phos, ALT, AST, Total. Bili, TP)    6. Encounter for screening mammogram for breast cancer  - *MA Screening Digital Bilateral; Future      Patient has been advised of split billing requirements and indicates understanding: Yes      BMI  Estimated body mass index is 25.06 kg/m  as calculated from the following:    Height as of this encounter: 1.632 m (5' 4.25\").    Weight as of this encounter: 66.7 kg (147 lb 2 oz).   Weight management plan: Discussed healthy diet and exercise guidelines    Counseling  Appropriate preventive services were discussed with this patient, including applicable screening as appropriate for fall prevention, nutrition, physical activity, Tobacco-use cessation, weight loss and cognition.  Checklist reviewing preventive services available has been given to the patient.  Reviewed patient's diet, addressing " concerns and/or questions.         Subjective   Saba is a 43 year old, presenting for the following:  Physical        4/12/2024     2:14 PM   Additional Questions   Roomed by Chelly BERNAL CMA   Accompanied by Self        Health Care Directive  Patient does not have a Health Care Directive or Living Will: Discussed advance care planning with patient; however, patient declined at this time.    HPI    Annual physical exam  - Hep B: unsure if previously vaccinated      Metrorrhagia  Encounter for surveillance of contraceptive pills  Doing combined oral contraceptive pill. Taking continuous.            4/12/2024   General Health   How would you rate your overall physical health? Excellent   Feel stress (tense, anxious, or unable to sleep) Rather much   (!) STRESS CONCERN        4/12/2024   Nutrition   Three or more servings of calcium each day? (!) I DON'T KNOW   Diet: Regular (no restrictions)   How many servings of fruit and vegetables per day? (!) 2-3   How many sweetened beverages each day? 0-1         4/12/2024   Exercise   Days per week of moderate/strenous exercise 4 days         4/12/2024   Social Factors   Frequency of gathering with friends or relatives Twice a week   Worry food won't last until get money to buy more No   Food not last or not have enough money for food? No   Do you have housing?  Yes   Are you worried about losing your housing? No   Lack of transportation? No   Unable to get utilities (heat,electricity)? No         4/12/2024   Dental   Dentist two times every year? Yes         4/12/2024   TB Screening   Were you born outside of the [place]? No       Today's PHQ-9 Score:       4/12/2024     2:04 PM   PHQ-9 SCORE   PHQ-9 Total Score MyChart 4 (Minimal depression)   PHQ-9 Total Score 4         4/12/2024   Substance Use   Alcohol more than 3/day or more than 7/wk No   Do you use any other substances recreationally? No     Social History     Tobacco Use    Smoking status: Never     Passive exposure:  Never    Smokeless tobacco: Never   Vaping Use    Vaping status: Never Used   Substance Use Topics    Alcohol use: Yes    Drug use: Never           4/12/2024   Breast Cancer Screening   Family history of breast, colon, or ovarian cancer? No / Unknown         5/3/2023   LAST FHS-7 RESULTS   1st degree relative breast or ovarian cancer No   Any relative bilateral breast cancer No   Any male have breast cancer No   Any ONE woman have BOTH breast AND ovarian cancer No   Any woman with breast cancer before 50yrs No   2 or more relatives with breast AND/OR ovarian cancer No   2 or more relatives with breast AND/OR bowel cancer No        Mammogram Screening - Mammogram every 1-2 years updated in Health Maintenance based on mutual decision making        4/12/2024   STI Screening   New sexual partner(s) since last STI/HIV test? No     History of abnormal Pap smear: NO - age 30-65 PAP every 5 years with negative HPV co-testing recommended        Latest Ref Rng & Units 12/1/2020     4:51 PM 8/10/2015     4:50 PM 12/19/2006    12:00 AM   PAP / HPV   PAP Negative for squamous intraepithelial lesion or malignancy. Negative for squamous intraepithelial lesion or malignancy  Electronically signed by Nancy Arce CT (ASCP) on 12/8/2020 at  3:14 PM    Negative for squamous intraepithelial lesion or malignancy  Electronically signed by Kristy Holder CT (ASCP) on 8/20/2015 at  3:44 PM       PAP (Historical)    NIL    HPV 16 DNA NEG Negative      HPV 18 DNA NEG Negative      Other HR HPV NEG Negative        ASCVD Risk   The 10-year ASCVD risk score (Damaris SHERWOOD, et al., 2019) is: 0.4%    Values used to calculate the score:      Age: 43 years      Sex: Female      Is Non- : No      Diabetic: No      Tobacco smoker: No      Systolic Blood Pressure: 122 mmHg      Is BP treated: No      HDL Cholesterol: 59 mg/dL      Total Cholesterol: 159 mg/dL        4/12/2024   Contraception/Family Planning  "  Questions about contraception or family planning No        Reviewed and updated as needed this visit by Provider   Tobacco  Allergies  Meds  Problems  Med Hx  Surg Hx  Fam Hx              Review of Systems  Constitutional, HEENT, cardiovascular, pulmonary, GI, , musculoskeletal, neuro, skin, endocrine and psych systems are negative, except as otherwise noted.     Objective    Exam  /78 (BP Location: Left arm, Patient Position: Sitting, Cuff Size: Adult Regular)   Pulse (!) 48   Temp 98.1  F (36.7  C) (Oral)   Resp 16   Ht 1.632 m (5' 4.25\")   Wt 66.7 kg (147 lb 2 oz)   LMP  (LMP Unknown)   SpO2 99%   BMI 25.06 kg/m     Estimated body mass index is 25.06 kg/m  as calculated from the following:    Height as of this encounter: 1.632 m (5' 4.25\").    Weight as of this encounter: 66.7 kg (147 lb 2 oz).    Physical Exam    GENERAL: alert and no distress  EYES: Eyes grossly normal to inspection, PERRL and conjunctivae and sclerae normal  HENT: ear canals and TM's normal, nose and mouth without ulcers or lesions  NECK: no adenopathy, no asymmetry, masses, or scars  RESP: lungs clear to auscultation - no rales, rhonchi or wheezes  CV: regular rate and rhythm, normal S1 S2, no S3 or S4, no murmur, click or rub, no peripheral edema  ABDOMEN: soft, nontender, no hepatosplenomegaly, no masses and bowel sounds normal  MS: no gross musculoskeletal defects noted, no edema  SKIN: no suspicious lesions or rashes  NEURO: Normal strength and tone, mentation intact and speech normal  PSYCH: mentation appears normal, affect normal/bright        Signed Electronically by: Ofelia Mclain, DO    "

## 2024-04-13 LAB
ALBUMIN SERPL BCG-MCNC: 4.5 G/DL (ref 3.5–5.2)
ALP SERPL-CCNC: 64 U/L (ref 40–150)
ALT SERPL W P-5'-P-CCNC: 11 U/L (ref 0–50)
ANION GAP SERPL CALCULATED.3IONS-SCNC: 11 MMOL/L (ref 7–15)
AST SERPL W P-5'-P-CCNC: 18 U/L (ref 0–45)
BILIRUB SERPL-MCNC: 0.3 MG/DL
BUN SERPL-MCNC: 18.2 MG/DL (ref 6–20)
CALCIUM SERPL-MCNC: 9.7 MG/DL (ref 8.6–10)
CHLORIDE SERPL-SCNC: 103 MMOL/L (ref 98–107)
CHOLEST SERPL-MCNC: 180 MG/DL
CREAT SERPL-MCNC: 0.88 MG/DL (ref 0.51–0.95)
DEPRECATED HCO3 PLAS-SCNC: 24 MMOL/L (ref 22–29)
EGFRCR SERPLBLD CKD-EPI 2021: 83 ML/MIN/1.73M2
FASTING STATUS PATIENT QL REPORTED: NO
GLUCOSE SERPL-MCNC: 87 MG/DL (ref 70–99)
HBV SURFACE AB SERPL IA-ACNC: 74.8 M[IU]/ML
HBV SURFACE AB SERPL IA-ACNC: REACTIVE M[IU]/ML
HBV SURFACE AG SERPL QL IA: NONREACTIVE
HDLC SERPL-MCNC: 59 MG/DL
LDLC SERPL CALC-MCNC: 104 MG/DL
NONHDLC SERPL-MCNC: 121 MG/DL
POTASSIUM SERPL-SCNC: 4.4 MMOL/L (ref 3.4–5.3)
PROT SERPL-MCNC: 7.6 G/DL (ref 6.4–8.3)
SODIUM SERPL-SCNC: 138 MMOL/L (ref 135–145)
TRIGL SERPL-MCNC: 86 MG/DL
TSH SERPL DL<=0.005 MIU/L-ACNC: 1.43 UIU/ML (ref 0.3–4.2)

## 2024-04-13 NOTE — RESULT ENCOUNTER NOTE
Patient updated by [employer] message with lab results.       Librado Saba,  Thanks again for coming into the clinic. Your lab results have returned and look great/normal.  You are immune to hepatitis B so I have removed this from your health maintenance list.  Please reach out with follow up questions or concerns.  Ofelia Mclain, DO

## 2024-05-02 ENCOUNTER — ANCILLARY PROCEDURE (OUTPATIENT)
Dept: MAMMOGRAPHY | Facility: CLINIC | Age: 44
End: 2024-05-02
Attending: FAMILY MEDICINE
Payer: COMMERCIAL

## 2024-05-02 DIAGNOSIS — Z12.31 VISIT FOR SCREENING MAMMOGRAM: ICD-10-CM

## 2024-05-02 PROCEDURE — 77063 BREAST TOMOSYNTHESIS BI: CPT

## 2024-05-31 ENCOUNTER — MYC REFILL (OUTPATIENT)
Dept: PSYCHIATRY | Facility: CLINIC | Age: 44
End: 2024-05-31
Payer: COMMERCIAL

## 2024-05-31 DIAGNOSIS — F90.2 ATTENTION DEFICIT HYPERACTIVITY DISORDER (ADHD), COMBINED TYPE: ICD-10-CM

## 2024-05-31 RX ORDER — LISDEXAMFETAMINE DIMESYLATE 50 MG/1
50 CAPSULE ORAL EVERY MORNING
Qty: 30 CAPSULE | Refills: 0 | OUTPATIENT
Start: 2024-05-31

## 2024-05-31 RX ORDER — LISDEXAMFETAMINE DIMESYLATE 50 MG/1
50 CAPSULE ORAL EVERY MORNING
Qty: 30 CAPSULE | Refills: 0 | Status: SHIPPED | OUTPATIENT
Start: 2024-06-03 | End: 2024-07-05

## 2024-05-31 RX ORDER — DEXTROAMPHETAMINE SACCHARATE, AMPHETAMINE ASPARTATE, DEXTROAMPHETAMINE SULFATE AND AMPHETAMINE SULFATE 2.5; 2.5; 2.5; 2.5 MG/1; MG/1; MG/1; MG/1
5-10 TABLET ORAL DAILY PRN
Qty: 30 TABLET | Refills: 0 | OUTPATIENT
Start: 2024-05-31

## 2024-05-31 NOTE — TELEPHONE ENCOUNTER
Last seen: 02/29/2024  RTC: 6 weeks  Cancel: 2  No-show: 0  Next appt: None     Incoming refill from Patient via mychart    Medication requested:   Pending Prescriptions:                       Disp   Refills    amphetamine-dextroamphetamine (ADDERALL) *30 tab*0            Sig: Take 0.5-1 tablets (5-10 mg) by mouth daily as           needed (for attentional coverage)    lisdexamfetamine (VYVANSE) 50 MG capsule  30 cap*0            Sig: Take 1 capsule (50 mg) by mouth every morning        Last refill per       From chart note:   - Continue lisdexamfetamine 50 mg daily  - continue amphetamine-dextroamphetamine 10 mg in the afternoons as-needed    Medication unable to be refilled by RN due to criteria not met as indicated.                 []Eligibility - not seen in the last year              [x]Supervision - no future appointment              [x]Compliance - no shows, cancellations or lapse in therapy              []Verification - order discrepancy              [x]Controlled medication              []Medication not included in policy              []90-day supply request              []Other:

## 2024-06-03 ENCOUNTER — TRANSFERRED RECORDS (OUTPATIENT)
Dept: HEALTH INFORMATION MANAGEMENT | Facility: CLINIC | Age: 44
End: 2024-06-03
Payer: COMMERCIAL

## 2024-06-11 ENCOUNTER — MYC REFILL (OUTPATIENT)
Dept: PSYCHIATRY | Facility: CLINIC | Age: 44
End: 2024-06-11

## 2024-06-11 DIAGNOSIS — F41.1 GENERALIZED ANXIETY DISORDER: ICD-10-CM

## 2024-06-11 DIAGNOSIS — F33.40 MAJOR DEPRESSIVE DISORDER, RECURRENT EPISODE, IN REMISSION WITH SEASONAL PATTERN (H): ICD-10-CM

## 2024-06-11 RX ORDER — DULOXETIN HYDROCHLORIDE 30 MG/1
30 CAPSULE, DELAYED RELEASE ORAL DAILY
Qty: 30 CAPSULE | Refills: 0 | Status: SHIPPED | OUTPATIENT
Start: 2024-06-11 | End: 2024-07-09

## 2024-06-11 NOTE — TELEPHONE ENCOUNTER
Last seen: 2/29/24  RTC: Return to clinic in 6 weeks  Cancel: 2  No-show: 0  Next appt: None     Incoming refill from Patient via [facility]    Medication requested:   Pending Prescriptions:                       Disp   Refills    DULoxetine (CYMBALTA) 30 MG capsule       30 cap*1            Sig: Take 1 capsule (30 mg) by mouth daily        From chart note:   1) Medications:   - Continue lisdexamfetamine 50 mg daily  - continue amphetamine-dextroamphetamine 10 mg in the afternoons as-needed  - Taper off citalopram 30 mg daily    - 20 mg for 7 days, then discontinue  - Start duloxetine 30 mg daily       Medication unable to be refilled by RN due to criteria not met as indicated.                 []Eligibility - not seen in the last year              []Supervision - no future appointment              [x]Compliance - no shows, cancellations or lapse in therapy              []Verification - order discrepancy              []Controlled medication              []Medication not included in policy              []90-day supply request              []Other:      None

## 2024-06-21 ENCOUNTER — TRANSFERRED RECORDS (OUTPATIENT)
Dept: HEALTH INFORMATION MANAGEMENT | Facility: CLINIC | Age: 44
End: 2024-06-21
Payer: COMMERCIAL

## 2024-06-26 ENCOUNTER — OFFICE VISIT (OUTPATIENT)
Dept: FAMILY MEDICINE | Facility: CLINIC | Age: 44
End: 2024-06-26
Payer: COMMERCIAL

## 2024-06-26 VITALS
DIASTOLIC BLOOD PRESSURE: 84 MMHG | TEMPERATURE: 98.5 F | WEIGHT: 147 LBS | OXYGEN SATURATION: 100 % | BODY MASS INDEX: 25.04 KG/M2 | SYSTOLIC BLOOD PRESSURE: 131 MMHG | RESPIRATION RATE: 16 BRPM | HEART RATE: 94 BPM

## 2024-06-26 DIAGNOSIS — R11.0 NAUSEA: Primary | ICD-10-CM

## 2024-06-26 PROCEDURE — 99213 OFFICE O/P EST LOW 20 MIN: CPT | Performed by: FAMILY MEDICINE

## 2024-06-26 RX ORDER — ONDANSETRON 4 MG/1
4 TABLET, ORALLY DISINTEGRATING ORAL EVERY 8 HOURS PRN
Qty: 18 TABLET | Refills: 1 | Status: SHIPPED | OUTPATIENT
Start: 2024-06-26

## 2024-06-26 ASSESSMENT — ANXIETY QUESTIONNAIRES
7. FEELING AFRAID AS IF SOMETHING AWFUL MIGHT HAPPEN: NOT AT ALL
4. TROUBLE RELAXING: NOT AT ALL
3. WORRYING TOO MUCH ABOUT DIFFERENT THINGS: NOT AT ALL
5. BEING SO RESTLESS THAT IT IS HARD TO SIT STILL: NOT AT ALL
2. NOT BEING ABLE TO STOP OR CONTROL WORRYING: SEVERAL DAYS
6. BECOMING EASILY ANNOYED OR IRRITABLE: SEVERAL DAYS
GAD7 TOTAL SCORE: 3
GAD7 TOTAL SCORE: 3
8. IF YOU CHECKED OFF ANY PROBLEMS, HOW DIFFICULT HAVE THESE MADE IT FOR YOU TO DO YOUR WORK, TAKE CARE OF THINGS AT HOME, OR GET ALONG WITH OTHER PEOPLE?: NOT DIFFICULT AT ALL
7. FEELING AFRAID AS IF SOMETHING AWFUL MIGHT HAPPEN: NOT AT ALL
IF YOU CHECKED OFF ANY PROBLEMS ON THIS QUESTIONNAIRE, HOW DIFFICULT HAVE THESE PROBLEMS MADE IT FOR YOU TO DO YOUR WORK, TAKE CARE OF THINGS AT HOME, OR GET ALONG WITH OTHER PEOPLE: NOT DIFFICULT AT ALL
GAD7 TOTAL SCORE: 3
1. FEELING NERVOUS, ANXIOUS, OR ON EDGE: SEVERAL DAYS

## 2024-06-26 NOTE — PROGRESS NOTES
Assessment & Plan     1. Nausea  - ondansetron (ZOFRAN ODT) 4 MG ODT tab; Take 1 tablet (4 mg) by mouth every 8 hours as needed for nausea  Dispense: 18 tablet; Refill: 1      Saba presents today with concerns for potential hormone symptoms.  She is concerned about fluctuation of her menstrual cycle resulting in symptoms such as nausea and headaches.  Symptoms were particularly bad within the last month.  Taking birth control pill continuously.  If she is experiencing perimenopausal symptoms, these should be treated and/or masked with her combined oral contraceptive pill.  Discussed possibility of a viral infection causing headaches and nausea?  Recommend continuing to monitor over the next cycle or 2 for trends.  Blood tests would not be helpful to check hormone levels while actively on the pill.    If you wanted to test the hormone theory, we could discontinue the pill, opt for a nonhormonal form of contraception like condoms, and better evaluate symptoms throughout the cycle.  She is less interested in this.    Reviewed possibility of gastritis or reflux causing symptoms.  Consider trial of Tums and/or Pepcid.  Will treat nausea with Zofran as needed.    Is taking her combined oral contraceptive pill continuously.  We could trial a week off to shed endometrial lining and then resume combined oral contraceptive pills.    If symptoms persisting, we can adjust hormone doses of her pill.      Subjective   Saba is a 43 year old, presenting for the following health issues:  Contraception (Discuss birth control, hormones, nausea, headaches )        6/26/2024    12:59 PM   Additional Questions   Roomed by Paradise ALEJANDRO CMA     History of Present Illness       Reason for visit:  Nausea/headaches that seem hormone relatewd  Symptom onset:  More than a month  Symptom intensity:  Moderate  Symptom progression:  Staying the same  Had these symptoms before:  Yes  Has tried/received treatment for these symptoms:  Yes  Previous  treatment was successful:  Yes  Prior treatment description:  Changed birth control    She eats 4 or more servings of fruits and vegetables daily.She consumes 0 sweetened beverage(s) daily.She exercises with enough effort to increase her heart rate 20 to 29 minutes per day.  She exercises with enough effort to increase her heart rate 5 days per week.   She is taking medications regularly.      Currently taking combined norethindrone ethinyl estradiol 1-35 mg-mcg continuously.     States she cyclically feels off - headache, nausea. Hard to work, laid in bed, couldn't do much with family.   Has been taking current combined pill for about a year.   Before continuous use, would have quarterly periods with combined OCP.     Headache with sensitivity to light. Ibuprofen wasn't helping.   Hx migraines but without aura.   Didn't experience any vomiting with nausea.   Symptoms persisted about 5 days.     Previously had digestive issues.     Has been experiencing some breakthrough spotting last 1.5 weeks. About 5 days ago, forgot to take her pill.         Review of Systems  See HPI above.         Objective    /84 (BP Location: Left arm, Patient Position: Sitting, Cuff Size: Adult Regular)   Pulse 94   Temp 98.5  F (36.9  C) (Oral)   Resp 16   Wt 66.7 kg (147 lb)   LMP  (LMP Unknown)   SpO2 100%   BMI 25.04 kg/m    Body mass index is 25.04 kg/m .  Physical Exam   GENERAL: alert and no distress  NECK: no adenopathy, no asymmetry, masses, or scars  RESP: lungs clear to auscultation - no rales, rhonchi or wheezes  CV: regular rate and rhythm, normal S1 S2, no S3 or S4, no murmur, click or rub, no peripheral edema  ABDOMEN: soft, nontender, no hepatosplenomegaly, no masses and bowel sounds normal  MS: no gross musculoskeletal defects noted, no edema          Signed Electronically by: Ofelia Mclain, DO

## 2024-07-02 ENCOUNTER — TRANSFERRED RECORDS (OUTPATIENT)
Dept: HEALTH INFORMATION MANAGEMENT | Facility: CLINIC | Age: 44
End: 2024-07-02
Payer: COMMERCIAL

## 2024-07-05 ENCOUNTER — MYC REFILL (OUTPATIENT)
Dept: PSYCHIATRY | Facility: CLINIC | Age: 44
End: 2024-07-05
Payer: COMMERCIAL

## 2024-07-05 DIAGNOSIS — F90.2 ATTENTION DEFICIT HYPERACTIVITY DISORDER (ADHD), COMBINED TYPE: ICD-10-CM

## 2024-07-05 RX ORDER — LISDEXAMFETAMINE DIMESYLATE 50 MG/1
50 CAPSULE ORAL EVERY MORNING
Qty: 30 CAPSULE | Refills: 0 | Status: SHIPPED | OUTPATIENT
Start: 2024-07-05 | End: 2024-07-31

## 2024-07-05 NOTE — TELEPHONE ENCOUNTER
Last seen: 02/09/2024  RTC: 6 weeks  Cancel: 04/11/2024, 05/23/2024  No-show: None  Next appt: 07/23/2024     Incoming refill from Patient via [facility]    Medication requested:   Pending Prescriptions:                       Disp   Refills    lisdexamfetamine (VYVANSE) 50 MG capsule  30 cap*0            Sig: Take 1 capsule (50 mg) by mouth every morning      Last refill per       From chart note:   - Continue lisdexamfetamine 50 mg daily      Medication unable to be refilled by RN due to criteria not met as indicated.                 []Eligibility - not seen in the last year              []Supervision - no future appointment              []Compliance - no shows, cancellations or lapse in therapy              []Verification - order discrepancy              [x]Controlled medication              []Medication not included in policy              []90-day supply request              []Other:

## 2024-07-08 DIAGNOSIS — F41.1 GENERALIZED ANXIETY DISORDER: ICD-10-CM

## 2024-07-08 DIAGNOSIS — F33.40 MAJOR DEPRESSIVE DISORDER, RECURRENT EPISODE, IN REMISSION WITH SEASONAL PATTERN (H): ICD-10-CM

## 2024-07-09 RX ORDER — DULOXETIN HYDROCHLORIDE 30 MG/1
30 CAPSULE, DELAYED RELEASE ORAL DAILY
Qty: 30 CAPSULE | Refills: 0 | Status: SHIPPED | OUTPATIENT
Start: 2024-07-09 | End: 2024-08-05

## 2024-07-09 RX ORDER — DULOXETIN HYDROCHLORIDE 30 MG/1
30 CAPSULE, DELAYED RELEASE ORAL DAILY
Qty: 14 CAPSULE | Refills: 0 | OUTPATIENT
Start: 2024-07-09 | End: 2024-08-15

## 2024-07-09 NOTE — TELEPHONE ENCOUNTER
"Date of Last Office Visit: 2/29/2024  [facility]  RTC: 6 weeks  No shows: 0  Cancellations: x2 - 4/11, 5/23  Date of Next Office Visit:    7/23/2024 8:00 AM (60 min)  [facility]   Arrive by:  7:45 AM   BRIEF CARE RETURN    URPSY ([facility])   Sharon Villalpando MD     ------------------------------    Incoming refill from Pharmacy via interface  Medication requested:    DULoxetine (CYMBALTA) 30 MG capsule 30 capsule 0 6/11/2024 -- No   Sig - Route: Take 1 capsule (30 mg) by mouth daily     ------------------------------  From last visit note:   \" Continue lisdexamfetamine 50 mg daily  - continue amphetamine-dextroamphetamine 10 mg in the afternoons as-needed  - Taper off citalopram 30 mg daily    - 20 mg for 7 days, then discontinue  - Start duloxetine 30 mg daily\"       Refill decision: Refill pended and routed to the provider for review/determination due to the following criteria not met:  Cancel x2     Medication unable to be refilled by RN due to criteria not met as indicated.                 []Eligibility - not seen in the last year              []Supervision - no future appointment              [x]Compliance - no shows, cancellations or lapse in therapy              []Verification - order discrepancy              []Controlled medication              []Medication not included in policy              []90-day supply request              []Other:                        "

## 2024-07-10 ENCOUNTER — MYC REFILL (OUTPATIENT)
Dept: PSYCHOLOGY | Facility: CLINIC | Age: 44
End: 2024-07-10
Payer: COMMERCIAL

## 2024-07-10 DIAGNOSIS — F33.40 MAJOR DEPRESSIVE DISORDER, RECURRENT EPISODE, IN REMISSION WITH SEASONAL PATTERN (H): ICD-10-CM

## 2024-07-10 DIAGNOSIS — F41.1 GENERALIZED ANXIETY DISORDER: ICD-10-CM

## 2024-07-10 RX ORDER — DULOXETIN HYDROCHLORIDE 30 MG/1
30 CAPSULE, DELAYED RELEASE ORAL DAILY
Qty: 30 CAPSULE | Refills: 0 | OUTPATIENT
Start: 2024-07-10

## 2024-07-10 NOTE — TELEPHONE ENCOUNTER
Medication denied, as the order was filled yesterday on 7/9.  Called the pharmacy to confirm the script was available.

## 2024-07-15 ENCOUNTER — TRANSFERRED RECORDS (OUTPATIENT)
Dept: HEALTH INFORMATION MANAGEMENT | Facility: CLINIC | Age: 44
End: 2024-07-15
Payer: COMMERCIAL

## 2024-07-24 ENCOUNTER — MYC REFILL (OUTPATIENT)
Dept: PSYCHIATRY | Facility: CLINIC | Age: 44
End: 2024-07-24
Payer: COMMERCIAL

## 2024-07-24 DIAGNOSIS — F90.2 ATTENTION DEFICIT HYPERACTIVITY DISORDER (ADHD), COMBINED TYPE: ICD-10-CM

## 2024-07-24 RX ORDER — DEXTROAMPHETAMINE SACCHARATE, AMPHETAMINE ASPARTATE, DEXTROAMPHETAMINE SULFATE AND AMPHETAMINE SULFATE 2.5; 2.5; 2.5; 2.5 MG/1; MG/1; MG/1; MG/1
5-10 TABLET ORAL DAILY PRN
Qty: 30 TABLET | Refills: 0 | Status: SHIPPED | OUTPATIENT
Start: 2024-07-24

## 2024-07-24 NOTE — TELEPHONE ENCOUNTER
Last seen: 2/29/24  RTC: 6 weeks  Cancel: 3  No-show: 0  Next appt: 8/20/24     Incoming refill from Patient via [facility]    Medication requested:   Pending Prescriptions:                       Disp   Refills    amphetamine-dextroamphetamine (ADDERALL) *30 tab*0            Sig: Take 0.5-1 tablets (5-10 mg) by mouth daily as           needed (for attentional coverage)        Last refill per         From chart note:     1) Medications:   - Continue lisdexamfetamine 50 mg daily  - continue amphetamine-dextroamphetamine 10 mg in the afternoons as-needed  - Taper off citalopram 30 mg daily    - 20 mg for 7 days, then discontinue  - Start duloxetine 30 mg daily             Medication unable to be refilled by RN due to criteria not met as indicated.                 []Eligibility - not seen in the last year              []Supervision - no future appointment              [x]Compliance - no shows, cancellations or lapse in therapy              []Verification - order discrepancy              [x]Controlled medication              []Medication not included in policy              []90-day supply request              []Other:

## 2024-07-24 NOTE — CONFIDENTIAL NOTE
Rx Refill note     I received rx refill request for amphetamine-dextroamphetamine 10mg, #30, sig 1/2 to 1 tab po qday PRN for attentional coverage - as I am a psychiatric prescriber of the day for the clinic today.     I again reviewed portions of Saab Marrero's medical record, including most recent psychiatric progress note by Serena Rosas and Ju[facility] of 2/29/2024. Reviewed notes in this encounter. I reviewed [facility] database.  Follow-up with Sharon Villalpando MD, is scheduled for 8/20/2024.     Will sign the order for amphetamine-dextroamphetamine 10mg, #30, sig 1/2 to 1 tab po qday PRN for attentional coverage, R-0, *Attend 9:00am 8/20/2024 appointment at [facility] for additional medication supply*.     Julito Sanford MD

## 2024-07-31 ENCOUNTER — MYC REFILL (OUTPATIENT)
Dept: PSYCHIATRY | Facility: CLINIC | Age: 44
End: 2024-07-31

## 2024-07-31 DIAGNOSIS — F90.2 ATTENTION DEFICIT HYPERACTIVITY DISORDER (ADHD), COMBINED TYPE: ICD-10-CM

## 2024-07-31 NOTE — TELEPHONE ENCOUNTER
Last seen: 2/29/24 with Dr Rosas  RTC: 6 weeks  Cancel: 4/11/24, 5/23/24, tx visit 7/23/24 with Dr Villalpando  No-show: none  Next appt: 8/20/24 with Dr Villalpando       Medication requested:   Pending Prescriptions:                       Disp   Refills    lisdexamfetamine (VYVANSE) 50 MG capsule  30 cap*0            Sig: Take 1 capsule (50 mg) by mouth every morning        Last refill per       From chart note:    Continue lisdexamfetamine 50 mg daily      Medication unable to be refilled by RN due to criteria not met as indicated.                 []Eligibility - not seen in the last year              []Supervision - no future appointment              [x]Compliance - no shows, cancellations or lapse in therapy              []Verification - order discrepancy              [x]Controlled medication              []Medication not included in policy              []90-day supply request              []Other:

## 2024-08-01 RX ORDER — LISDEXAMFETAMINE DIMESYLATE 50 MG/1
50 CAPSULE ORAL EVERY MORNING
Qty: 30 CAPSULE | Refills: 0 | Status: SHIPPED | OUTPATIENT
Start: 2024-08-05

## 2024-08-02 DIAGNOSIS — F41.1 GENERALIZED ANXIETY DISORDER: ICD-10-CM

## 2024-08-02 DIAGNOSIS — F33.40 MAJOR DEPRESSIVE DISORDER, RECURRENT EPISODE, IN REMISSION WITH SEASONAL PATTERN (H): ICD-10-CM

## 2024-08-05 RX ORDER — DULOXETIN HYDROCHLORIDE 30 MG/1
30 CAPSULE, DELAYED RELEASE ORAL DAILY
Qty: 30 CAPSULE | Refills: 0 | Status: SHIPPED | OUTPATIENT
Start: 2024-08-05 | End: 2024-08-20

## 2024-08-05 NOTE — TELEPHONE ENCOUNTER
"Date of Last Office Visit: 2/29/2024  [facility]      RTC: 6 wks  No shows: 0  Cancellations: x3 - 7/23, 5/23, 4/11  Date of Next Office Visit:    8/20/2024 9:00 AM (60 min)  [facility]   Arrive by:  8:45 AM   BRIEF CARE RETURN    URPSY ([facility])   Sharon Villalpando MD     ------------------------------    Incoming refill from Pharmacy via interface  Medication requested:     Disp Refills Start End [facility]   DULoxetine (CYMBALTA) 30 MG capsule 30 capsule 0 7/9/2024 -- No   Sig - Route: Take 1 capsule (30 mg) by mouth daily - Oral     ------------------------------  From last visit note:   \" Start duloxetine 30 mg daily\" \"       Refill decision: Refill pended and routed to the provider for review/determination due to the following criteria not met: Cancel x3, next scheduled 8/20    Medication unable to be refilled by RN due to criteria not met as indicated.                 []Eligibility - not seen in the last year              []Supervision - no future appointment              [x]Compliance - no shows, cancellations or lapse in therapy              []Verification - order discrepancy              []Controlled medication              []Medication not included in policy              []90-day supply request              []Other:                        "

## 2024-08-19 NOTE — PROGRESS NOTES
[facility]  Brief Care Team  TRANSFER of CARE DIAGNOSTIC ASSESSMENT       CARE TEAM:    PCP- Ofelia Mclain  Therapist- None    Saba is a 44 year old who uses the pronouns she, her, hers.                   Chief Concern    Irritability                Assessment & Plan     Attention deficit hyperactivity disorder, predominantly inattentive presentation  Generalized anxiety disorder  Major depressive disorder, recurrent, full remission    Saba is a 44 year old with the above diagnoses who follows in the clinic for medication management.  In the past few months she has started a new full-time job which she reports being very stressful.  She does believe the transition in antidepressant to duloxetine 30 mg daily has been helpful and has kept her mood from getting too low, denying any symptoms of depression.  Her sleep has also been good, with no concerns.  However, she does note feeling stressed and irritable with not enough energy to do extracurricular things outside of work and home duties.  She feels that her ADHD is well-controlled and she is able to get her work done.  Discussed recommendation to establish with therapist in order to augment medication and learn additional coping skills.  She noted that she has this available through her work and will try going this route for now.  Also discussed potentially working with PCP ongoing for prescribing these medications. Discussed and reviewed supplements-she would like to remain on same medication regimen at this time. She also inquired into effect of perimenopause on mood, which we will continue to monitor for.     Psychotropic Drug Interactions:    ADDITIVE SEROTONERGIC: Amphetamines may enhance the serotonergic effect of Serotonergic Agents (High Risk). This could result in serotonin syndrome.   Management: limit med redundancy and routine monitoring    [place]: indicates  "that controlled prescriptions have been filled as prescribed    Risk Statements:   Treatment Risk: Risks, benefits, alternatives and potential adverse effects have been discussed and are understood.   Safety Risk: Saba did not appear to be an imminent safety risk to self or others.    PLAN    1) Medications:   - Continue lisdexamfetamine (vyvanse) 50 mg daily  - continue amphetamine-dextroamphetamine (adderall) 10 mg in the afternoons as-needed  - continue duloxetine 30 mg daily     OTC - Melatonin prn,  womens multivitamin, calcium supplements, \"happy\" vitamin with vitamin D and saffron, zyrtec, fish oil, Alacen    2) Psychotherapy: would like to start seeing a therapist (offered through work)    3) Next due:  Labs: Routine monitoring is not indicated for current psychotropic medication regimen   EKG: Routine monitoring is not indicated for current psychotropic medication regimen     4) Referrals: none - messaged PCP regarding transfer- she is agreeable to continue prescribing medications and instructed to schedule a med check appointment when due to transfer    5) Follow-up: Return to clinic in 3 months                    Pertinent Background    Per stanley 10/09/23:   Previous diagnoses include generalized anxiety disorder and mild major depressive disorder. Experienced symptoms of anxiety throughout her life, but noticeably worsened after birth of her firstborn. She has gotten past psychiatric care through her primary care provider and did not start any psychiatric medications until she finished nursing her youngest child around 2017. She first experienced depression at that time. Most recently, her 2nd oldest (son) was diagnosed with ADHD and responded well to treatment; noticed similar symptoms in self and pursued ADHD diagnosis which was confirmed (see 07/13/21 progress note by Dr. Janna York).      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   - it's been really stressful doing a full time job, also 4 kids  - anxiety and stress have improved since started duloxetine  - one concern with mood is crabby all the time, comes out as frustration with kids/, doesn't feel like doing anything, just being a home body, seems to have gotten worse lately, taking it out   - been depressed before and doesn't feel that, no SI, safe at home  - takes OCP continuously so doesn't get period, doesn't fully understand what it does, still having fluctuations of hormones   - perimenopause age, discussed this with PCP  - always so much to do, feels overwhelmed by this  - cymbalta because was feeling flat on previous med, think it helped with less highs and lows   - struggling with waking up early in terms of sleep but can sleep continuously through the night   - takes dextroamphetamine in afternoon so is careful when takes it - needs reminders, has an alarm but clicks it off, if takes at 2pm takes a half   - feeling happy with ADHD Medications    Current Social History:  Financial/occupational: works full time remotely from home   Living situation: lives at home with  and kids  Social/spiritual support: can talk to family, friends      Pertinent Substance Use:  Alcohol: Yes: cut back, one or two a week    Cannabis: No  Tobacco: No  Caffeine:  No  Other substances: No                 Physical Exam  (Vitals Only)    LMP  (LMP Unknown)   Pulse Readings from Last 3 Encounters:   06/26/24 94   04/12/24 (!) 48   04/04/23 72     Wt Readings from Last 3 Encounters:   06/26/24 66.7 kg (147 lb)   04/12/24 66.7 kg (147 lb 2 oz)   04/04/23 67.2 kg (148 lb 2 oz)     BP Readings from Last 3 Encounters:   06/26/24 131/84   04/12/24 122/78   04/04/23 111/66                      Mental Status Exam    Alertness: alert  and oriented  Appearance: casually groomed  Behavior/Demeanor: cooperative and  calm, with good  eye contact   Speech: normal and regular rate and rhythm  Language: intact and no problems  Psychomotor: normal or unremarkable  Mood:  overwhelmed  Affect: restricted; congruent to: mood- yes, content- yes  Thought Process/Associations: unremarkable  Thought Content:  Reports none;  Denies suicidal ideation  Perception:  Reports none;  Denies hallucinations  Insight: good  Judgment: good  Cognition: does  appear grossly intact; formal cognitive testing was not done  Gait and Station: N/A (telehealth)                Family History     Mother - depression and alcohol use disorder  2/4 children with anxiety and ADHD   Possible anxiety   Brother ADHD                  Past Psychiatric History   Copied forward from previous eval 10/09/23 and reviewed with patient:   SIB- none  Suicide Attempt [#, most recent]- No   Suicidal Ideation Hx- No   Violence/Aggression Hx- No   Psychosis Hx- No   Eating Disorder Hx- No: currently setting modist weightloss goal   Other- none  Psych Hosp [#, most recent]- No   Commitment- No   TMS/ECT- No   Outpatient Programs - No     SUBSTANCE USE HISTORY   Past Use: Yes: alcohol 1-2 drinks daily during COVID  Treatment [#, most recent]: No   Medical Consequences: No   Legal Consequences: No                 Past Psychotropic Medication Trials     Medication Max Dose (mg) Dates / Duration Helpful? DC Reason / Adverse Effects?   Cymbalta 30  Y     Adderall 20  ? Initially helpful, but too short-acting for lifestyle   Vyvanse  50   Y     bupropion 150? 12/2019   Worsened anxiety   sertaline 100? 150? 11/17-7/19 Y Lost efficacy over time    Adderall 10      Adderall XR                           Past Medical History     Neurologic Hx [head injury, seizures, etc]:     Contraception : continuous OCP    Patient Active Problem List   Diagnosis    Allergic rhinitis    Other type of migraine    Multiple atypical nevi    Urinary incontinence in female    Mild major depression (H24)     Generalized anxiety disorder    Contraception management    Metrorrhagia    Major depressive disorder, recurrent episode, in remission with seasonal pattern (H24)    Attention deficit hyperactivity disorder (ADHD), combined type                     Allergies     Patient has no known allergies.                Medications     Current Outpatient Medications   Medication Sig Dispense Refill    amphetamine-dextroamphetamine (ADDERALL) 10 MG tablet Take 0.5-1 tablets (5-10 mg) by mouth daily as needed (for attentional coverage) 30 tablet 0    CALCIUM PO       Cholecalciferol (VITAMIN D3 PO)       DULoxetine (CYMBALTA) 30 MG capsule Take 1 capsule (30 mg) by mouth daily MUST ATTEND appointment 08/20 for further refills 30 capsule 0    lisdexamfetamine (VYVANSE) 50 MG capsule Take 1 capsule (50 mg) by mouth every morning Must attend 8/20/24 visit for further refills 30 capsule 0    lisdexamfetamine (VYVANSE) 50 MG capsule Take 1 capsule (50 mg) by mouth every morning 25 capsule 0    Multiple Vitamin (MULTIVITAMIN ADULT PO)       norethindrone-ethinyl estradiol (ALAYCEN 1/35) 1-35 MG-MCG tablet TAKE 1 TABLET BY MOUTH DAILY USE CONTINUOUSLY TO HAVE MENSES EVERY 3 MONTHS 112 tablet 4    Omega-3 1000 MG capsule Take 2 g by mouth      ondansetron (ZOFRAN ODT) 4 MG ODT tab Take 1 tablet (4 mg) by mouth every 8 hours as needed for nausea 18 tablet 1                     Data         4/11/2023     8:42 AM 10/8/2023     3:11 PM 1/29/2024    12:24 PM   PROMIS-10 Total Score w/o Sub Scores   PROMIS TOTAL - SUBSCORES 32 33 36          No data to display                  4/12/2024     2:04 PM 6/26/2024    10:33 AM 8/20/2024     8:47 AM   PHQ-9 SCORE   PHQ-9 Total Score MyChart 4 (Minimal depression) 1 (Minimal depression) 4 (Minimal depression)   PHQ-9 Total Score 4 1 4         2/29/2024     9:15 AM 4/12/2024     2:04 PM 6/26/2024    10:34 AM   NAYELY-7 SCORE   Total Score 12 (moderate anxiety) 9 (mild anxiety) 3 (minimal anxiety)   Total  Score 12 9 3       Liver/Kidney Function, TSH Metabolic Blood counts   Recent Labs   Lab Test 04/12/24  1520 04/04/23  1123   AST 18 18   ALT 11 12   ALKPHOS 64 58   CR 0.88 0.91     Recent Labs   Lab Test 04/12/24  1520   TSH 1.43    Recent Labs   Lab Test 04/12/24  1520   CHOL 180   TRIG 86   *   HDL 59     No lab results found.  Recent Labs   Lab Test 04/12/24  1520   GLC 87    Recent Labs   Lab Test 04/12/24  1520   WBC 6.2   HGB 13.0   HCT 39.4   MCV 88                  PROVIDER: Sharon Villalpando MD    Patient staffed in clinic with Dr. Dallas who will sign the note.  Supervisor is Dr. Sanford.

## 2024-08-20 ENCOUNTER — VIRTUAL VISIT (OUTPATIENT)
Dept: PSYCHIATRY | Facility: CLINIC | Age: 44
End: 2024-08-20
Attending: PSYCHIATRY & NEUROLOGY
Payer: COMMERCIAL

## 2024-08-20 DIAGNOSIS — F90.2 ATTENTION DEFICIT HYPERACTIVITY DISORDER (ADHD), COMBINED TYPE: Primary | ICD-10-CM

## 2024-08-20 DIAGNOSIS — F41.1 GENERALIZED ANXIETY DISORDER: ICD-10-CM

## 2024-08-20 DIAGNOSIS — F33.40 MAJOR DEPRESSIVE DISORDER, RECURRENT EPISODE, IN REMISSION WITH SEASONAL PATTERN (H): ICD-10-CM

## 2024-08-20 PROCEDURE — 90792 PSYCH DIAG EVAL W/MED SRVCS: CPT | Mod: 95

## 2024-08-20 RX ORDER — DULOXETIN HYDROCHLORIDE 30 MG/1
30 CAPSULE, DELAYED RELEASE ORAL DAILY
Qty: 30 CAPSULE | Refills: 3 | Status: SHIPPED | OUTPATIENT
Start: 2024-08-20

## 2024-08-20 ASSESSMENT — PATIENT HEALTH QUESTIONNAIRE - PHQ9
SUM OF ALL RESPONSES TO PHQ QUESTIONS 1-9: 4
SUM OF ALL RESPONSES TO PHQ QUESTIONS 1-9: 4
10. IF YOU CHECKED OFF ANY PROBLEMS, HOW DIFFICULT HAVE THESE PROBLEMS MADE IT FOR YOU TO DO YOUR WORK, TAKE CARE OF THINGS AT HOME, OR GET ALONG WITH OTHER PEOPLE: SOMEWHAT DIFFICULT

## 2024-08-20 ASSESSMENT — PAIN SCALES - GENERAL: PAINLEVEL: NO PAIN (0)

## 2024-08-20 NOTE — NURSING NOTE
Current patient location: [address] [place] [address] [address] [place] [address]    Is the patient currently in the state of [place]? YES    Visit mode:VIDEO    If the visit is dropped, the patient can be reconnected by: VIDEO VISIT: Send to e-mail at: [email or web address]    Will anyone else be joining the visit? NO  (If patient encounters technical issues they should call [phone])    How would you like to obtain your AVS? MyChart    Are changes needed to the allergy or medication list? No    Are refills needed on medications prescribed by this physician? unsure    Rooming Documentation:  Questionnaire(s) completed      Reason for visit: MARIA TERESA REEDF

## 2024-08-20 NOTE — PROGRESS NOTES
Virtual Visit Details    Type of service:  Video visit  Start: 0906  End: 0938    Originating Location (pt. Location): Home    Distant Location (provider location):  On-site  Platform used for Video Visit: Nexamp    Answers submitted by the patient for this visit:  Patient Health Questionnaire (Submitted on 8/20/2024)  If you checked off any problems, how difficult have these problems made it for you to do your work, take care of things at home, or get along with other people?: Somewhat difficult  PHQ9 TOTAL SCORE: 4

## 2024-08-20 NOTE — PATIENT INSTRUCTIONS
**For crisis resources, please see the information at the end of this document**   Patient Education    Thank you for coming to the [facility].     Lab Testing:  If you had lab testing today and your results are reassuring or normal they will be mailed to you or sent through [employer] within 7 days. If the lab tests need quick action we will call you with the results. The phone number we will call with results is # [phone]. If this is not the best number please call our clinic and change the number.     Medication Refills:  If you need any refills please call your pharmacy and they will contact us. Our fax number for refills is [phone].   Three business days of notice are needed for general medication refill requests.   Five business days of notice are needed for controlled substance refill requests.   If you need to change to a different pharmacy, please contact the new pharmacy directly. The new pharmacy will help you get your medications transferred.     Contact Us:  Please call [phone] during business hours (8-5:00 M-F).   If you have medication related questions after clinic hours, or on the weekend, please call [phone].     Financial Assistance [phone]   Medical Records [phone]       MENTAL HEALTH CRISIS RESOURCES:  For a emergency help, please call 911 or go to the nearest Emergency Department.     Emergency Walk-In Options:   [facility] ([place]): [phone] - Specialized mental health emergency area designed to be calming  [facility] ([place]): [phone]  [facility] ([place]): [phone]  [facility]): [phone]    [facility] Crisis Information:   [place]: [phone]  [place]: [phone]  [place] ([place]) - Adult: [phone]     Child: [phone]  [place] - Adult: [phone]     Child: [phone]  [place]:  [phone]  List of all [facility] resources:   https://[place].gov/dhs/people-we-serve/adults/health-care/mental-health/resources/crisis-contacts.jsp    National Crisis Information:   Crisis Text Line: Text  [place]  to 331175  Suicide & Crisis Lifeline: 988  National Suicide Prevention Lifeline: 8-519-802-TALK (1-506.231.5304)       For online chat options, visit https://suicidepreventionlifeline.org/chat/  Poison Control Center: 8-324-811-2290  Trans Lifeline: 8-077-765-3031 - Hotline for transgender people of all ages  The [employer]: 5-469-848-0087 - Hotline for LGBT youth     For Non-Emergency Support:   Fast Tracker: Mental Health & Substance Use Disorder Resources -   https://www.[employer].org/

## 2024-08-21 RX ORDER — DEXTROAMPHETAMINE SACCHARATE, AMPHETAMINE ASPARTATE, DEXTROAMPHETAMINE SULFATE AND AMPHETAMINE SULFATE 2.5; 2.5; 2.5; 2.5 MG/1; MG/1; MG/1; MG/1
10 TABLET ORAL DAILY
Qty: 30 TABLET | Refills: 0 | Status: SHIPPED | OUTPATIENT
Start: 2024-08-21 | End: 2024-09-20

## 2024-08-21 RX ORDER — LISDEXAMFETAMINE DIMESYLATE 50 MG/1
50 CAPSULE ORAL EVERY MORNING
Qty: 30 CAPSULE | Refills: 0 | Status: SHIPPED | OUTPATIENT
Start: 2024-09-10 | End: 2024-09-09

## 2024-08-21 RX ORDER — DEXTROAMPHETAMINE SACCHARATE, AMPHETAMINE ASPARTATE, DEXTROAMPHETAMINE SULFATE AND AMPHETAMINE SULFATE 2.5; 2.5; 2.5; 2.5 MG/1; MG/1; MG/1; MG/1
10 TABLET ORAL DAILY
Qty: 30 TABLET | Refills: 0 | Status: SHIPPED | OUTPATIENT
Start: 2024-09-19 | End: 2024-10-19

## 2024-08-21 RX ORDER — DEXTROAMPHETAMINE SACCHARATE, AMPHETAMINE ASPARTATE, DEXTROAMPHETAMINE SULFATE AND AMPHETAMINE SULFATE 2.5; 2.5; 2.5; 2.5 MG/1; MG/1; MG/1; MG/1
10 TABLET ORAL DAILY
Qty: 30 TABLET | Refills: 0 | Status: SHIPPED | OUTPATIENT
Start: 2024-10-19 | End: 2024-11-18

## 2024-09-09 ENCOUNTER — MYC MEDICAL ADVICE (OUTPATIENT)
Dept: PSYCHIATRY | Facility: CLINIC | Age: 44
End: 2024-09-09

## 2024-09-09 DIAGNOSIS — F90.2 ATTENTION DEFICIT HYPERACTIVITY DISORDER (ADHD), COMBINED TYPE: ICD-10-CM

## 2024-09-09 RX ORDER — LISDEXAMFETAMINE DIMESYLATE 50 MG/1
50 CAPSULE ORAL EVERY MORNING
Qty: 30 CAPSULE | Refills: 0 | Status: SHIPPED | OUTPATIENT
Start: 2024-09-09

## 2024-09-09 NOTE — TELEPHONE ENCOUNTER
Last refill per         From chart note:   1) Medications:   - Continue lisdexamfetamine (vyvanse) 50 mg daily  - continue amphetamine-dextroamphetamine (adderall) 10 mg in the afternoons as-needed  - continue duloxetine 30 mg daily     Medication refill approved per refill protocol.     Medication unable to be refilled by RN due to criteria not met as indicated.                 []Eligibility - not seen in the last year              []Supervision - no future appointment              []Compliance - no shows, cancellations or lapse in therapy              [x]Verification - order discrepancy              [x]Controlled medication              []Medication not included in policy              []90-day supply request              [x]Other:

## 2024-09-19 PROBLEM — F33.40: Status: ACTIVE | Noted: 2023-10-11

## 2024-10-01 ENCOUNTER — TRANSFERRED RECORDS (OUTPATIENT)
Dept: HEALTH INFORMATION MANAGEMENT | Facility: CLINIC | Age: 44
End: 2024-10-01

## 2024-11-11 ENCOUNTER — TRANSFERRED RECORDS (OUTPATIENT)
Dept: HEALTH INFORMATION MANAGEMENT | Facility: CLINIC | Age: 44
End: 2024-11-11

## 2024-11-12 ENCOUNTER — MYC REFILL (OUTPATIENT)
Dept: PSYCHIATRY | Facility: CLINIC | Age: 44
End: 2024-11-12

## 2024-11-12 DIAGNOSIS — F90.2 ATTENTION DEFICIT HYPERACTIVITY DISORDER (ADHD), COMBINED TYPE: ICD-10-CM

## 2024-11-13 RX ORDER — LISDEXAMFETAMINE DIMESYLATE 50 MG/1
50 CAPSULE ORAL EVERY MORNING
Qty: 30 CAPSULE | Refills: 0 | Status: SHIPPED | OUTPATIENT
Start: 2024-11-13

## 2024-11-13 NOTE — TELEPHONE ENCOUNTER
"Last seen: 8/20  RTC: 3 months  Cancel: None  No-show: None  Next appt: 11/26    Last ref[place] Per [place]       Medication requested:   Pending Prescriptions:                       Disp   Refills    lisdexamfetamine (VYVANSE) 50 MG capsule  30 cap*0            Sig: Take 1 capsule (50 mg) by mouth every morning.        From chart note:   1) Medications:   - Continue lisdexamfetamine (vyvanse) 50 mg daily  - continue amphetamine-dextroamphetamine (adderall) 10 mg in the afternoons as-needed  - continue duloxetine 30 mg daily     OTC - Melatonin prn,  womens multivitamin, calcium supplements, \"happy\" vitamin with vitamin D and saffron, zyrtec, fish oil, Alacen      Refills sent to provider for approval.      "

## 2024-11-25 NOTE — PROGRESS NOTES
[facility]  MEDICAL PROGRESS NOTE  Brief Care Team       CARE TEAM:    PCP- Ofelai Mclain  Therapist- None    Saba is a 44 year old who uses the pronouns she, her, hers.                 Assessment & Plan   {Must include bolded diagnoses. Assessment can be narrative or per problem:397319}  Attention deficit hyperactivity disorder, predominantly inattentive presentation  Generalized anxiety disorder  Major depressive disorder, recurrent, full remission     Saba is a 44 year old with the above diagnoses who follows in the clinic for medication management.  In the past few months she has started a new full-time job which she reports being very stressful.  She does believe the transition in antidepressant to duloxetine 30 mg daily has been helpful and has kept her mood from getting too low, denying any symptoms of depression.  Her sleep has also been good, with no concerns.  However, she does note feeling stressed and irritable with not enough energy to do extracurricular things outside of work and home duties.  She feels that her ADHD is well-controlled and she is able to get her work done.  Discussed recommendation to establish with therapist in order to augment medication and learn additional coping skills.  She noted that she has this available through her work and will try going this route for now.  Also discussed potentially working with PCP ongoing for prescribing these medications. Discussed and reviewed supplements-she would like to remain on same medication regimen at this time. She also inquired into effect of perimenopause on mood, which we will continue to monitor for.     Psychotropic Drug Interactions:  {[PSYCHCLINICDDI]:187667}  ADDITIVE SEROTONERGIC: Amphetamines may enhance the serotonergic effect of Serotonergic Agents (High Risk). This could result in serotonin syndrome.   Management: routine monitoring    MNPMP was not  "checked today: {P:785662}    Risk Statements:   Treatment Risk: Risks, benefits, alternatives and potential adverse effects have been discussed and are understood.   Safety Risk: Saba did not appear to be an imminent safety risk to self or others.    PLAN    1) Medications:   - Continue lisdexamfetamine (vyvanse) 50 mg daily  - continue amphetamine-dextroamphetamine (adderall) 10 mg in the afternoon  - continue duloxetine 30 mg daily     OTC - Melatonin prn,  womens multivitamin, calcium supplements, \"happy\" vitamin with vitamin D and saffron, zyrtec, fish oil, Alacen    2) Psychotherapy: none     3) Next due:  Labs: {:859784::Routine monitoring is not indicated for current psychotropic medication regimen}   EKG: {:427565::Routine monitoring is not indicated for current psychotropic medication regimen}   Rating scales: {PSYCHRATINGSCALES:304956}    4) Referrals: ***    5) Follow-up: Return to clinic in ***                     Interval History   - Updates: no complaints, had sme SAD symptoms around the time it started to get dark at 5, was exhausted but gotten better. Kids are back in school which has been helpful. Still working from home. ADHD symptoms well controlled   -Mood: overall better   -Depression: tries to get natural light in the morning  -Sleep: good   -Anxiety: manageable   -SI:  none   -Psychosis:   -Substances:   -Therapy: some help from insurance company   -Medications:    Current Social History:  Financial/occupational:  works full time remotely from home   Living situation{partner, children, pets, etc:375487}: lives at home with  and kids   Social/spiritual support: can talk to family, friends       Pertinent Substance Use:  [Last updated 11/25/24]  Alcohol: YeS: but cut way back, 1-2 drinks a week   Cannabis: No  Tobacco: No  Caffeine:  Yes: none with stimulants   Opioids: {:60}   Narcan Kit current: {:60::N/A}  Other substances: {:60}     Medical Review of Systems / Med sfx:   {Optional " "sections - USE TO ASSESS FOR MED SFX:210046::\"A comprehensive review of systems was performed and is negative other than noted above.\"}    Contraception: {:60}                  Summary Points of Current Care  2024: ***                  Physical Exam  (Vitals Only)    There were no vitals taken for this visit.  Pulse Readings from Last 3 Encounters:   24 94   24 (!) 48   23 72     Wt Readings from Last 3 Encounters:   24 66.7 kg (147 lb)   24 66.7 kg (147 lb 2 oz)   23 67.2 kg (148 lb 2 oz)     BP Readings from Last 3 Encounters:   24 131/84   24 122/78   23 111/66                      Mental Status Exam    Alertness: {ALERTNESS DESCRIPTION:645721}  Appearance: {a:769282}  Behavior/Demeanor: {BEHAVIOR Description:733781}, with {d:984823} eye contact   Speech: {SPEECH Description:327761}  Language: {LANGUAGE Description:653464}  Psychomotor: {p:979205}  Mood: {m:966143}  Affect: {a:774269}; congruent to: mood- {:005278}, content- {:563108}  Thought Process/Associations: {THOUGHT PROCESS Description:273685}  Thought Content:  Reports {t:817278};  Denies {t:654088}  Perception:  Reports {p:197476};  Denies {p:820514}  Insight: {INSIGHT Description:326976}  Judgment: {JUDGMENT Description:961015}  Cognition: {co}  Gait and Station: {}                  Past Psychotropic Medication Trials     Medication Max Dose (mg) Dates / Duration Helpful? DC Reason / Adverse Effects?                                                                                  Past Medical History     Patient Active Problem List   Diagnosis    Allergic rhinitis    Other type of migraine    Multiple atypical nevi    Urinary incontinence in female    Generalized anxiety disorder    Contraception management    Metrorrhagia    Major depressive disorder, recurrent episode, in remission with seasonal pattern (H)    Attention deficit hyperactivity disorder (ADHD), combined type "                     Medications     Current Outpatient Medications   Medication Sig Dispense Refill    amphetamine-dextroamphetamine (ADDERALL) 10 MG tablet Take 0.5-1 tablets (5-10 mg) by mouth daily as needed (for attentional coverage) 30 tablet 0    CALCIUM PO       Cholecalciferol (VITAMIN D3 PO)       DULoxetine (CYMBALTA) 30 MG capsule Take 1 capsule (30 mg) by mouth daily 30 capsule 3    lisdexamfetamine (VYVANSE) 50 MG capsule Take 1 capsule (50 mg) by mouth every morning. 30 capsule 0    lisdexamfetamine (VYVANSE) 50 MG capsule Take 1 capsule (50 mg) by mouth every morning Must attend 8/20/24 visit for further refills 30 capsule 0    Multiple Vitamin (MULTIVITAMIN ADULT PO)       norethindrone-ethinyl estradiol (ALAYCEN 1/35) 1-35 MG-MCG tablet TAKE 1 TABLET BY MOUTH DAILY USE CONTINUOUSLY TO HAVE MENSES EVERY 3 MONTHS 112 tablet 4    Omega-3 1000 MG capsule Take 2 g by mouth      ondansetron (ZOFRAN ODT) 4 MG ODT tab Take 1 tablet (4 mg) by mouth every 8 hours as needed for nausea 18 tablet 1                     Data         10/8/2023     3:11 PM 1/29/2024    12:24 PM 8/20/2024     8:48 AM   PROMIS-10 Total Score w/o Sub Scores   PROMIS TOTAL - SUBSCORES 33 36 33         4/12/2024     2:04 PM 6/26/2024    10:33 AM 8/20/2024     8:47 AM   PHQ-9 SCORE   PHQ-9 Total Score MyChart 4 (Minimal depression) 1 (Minimal depression) 4 (Minimal depression)   PHQ-9 Total Score 4 1 4         2/29/2024     9:15 AM 4/12/2024     2:04 PM 6/26/2024    10:34 AM   NAYELY-7 SCORE   Total Score 12 (moderate anxiety) 9 (mild anxiety) 3 (minimal anxiety)   Total Score 12 9 3       Liver/Kidney Function, TSH Metabolic Blood counts   Recent Labs   Lab Test 04/12/24  1520 04/04/23  1123   AST 18 18   ALT 11 12   ALKPHOS 64 58   CR 0.88 0.91     Recent Labs   Lab Test 04/12/24  1520   TSH 1.43    Recent Labs   Lab Test 04/12/24  1520   CHOL 180   TRIG 86   *   HDL 59     No lab results found.  Recent Labs   Lab Test  04/12/24  1520   GLC 87    Recent Labs   Lab Test 04/12/24  1520   WBC 6.2   HGB 13.0   HCT 39.4   MCV 88            {Only keep ECG results >1 yr old if QTc>460ms:675838}    Level of Medical Decision Making:   - At least 1 chronic problem that is not stable  - Engaged in prescription drug management during visit (discussed any medication benefits, side effects, alternatives, etc.)     {   Must meet 2 out of 3 of the above MDM elements to bill at the specified level     For help more info about elements / criteria, click here-> MDM Help Grid     **No need to delete blue text, it disappears when note is signed**       Use the following statement and the  add on code if providing longitudinal care for a chronic diagnosis.   :695335}  The longitudinal plan of care for the diagnosis(es)/condition(s) as documented were addressed during this visit. Due to the added complexity in care, I will continue to support Saba in the subsequent management and with ongoing continuity of care.      PROVIDER: Sharon Villalpando MD    Patient not staffed in clinic.  Note will be reviewed and signed by supervisor Dr. Sanford.

## 2024-11-26 ENCOUNTER — VIRTUAL VISIT (OUTPATIENT)
Dept: PSYCHIATRY | Facility: CLINIC | Age: 44
End: 2024-11-26
Attending: PSYCHIATRY & NEUROLOGY
Payer: COMMERCIAL

## 2024-11-26 VITALS — BODY MASS INDEX: 23.82 KG/M2 | HEIGHT: 65 IN | WEIGHT: 143 LBS

## 2024-11-26 DIAGNOSIS — F90.2 ATTENTION DEFICIT HYPERACTIVITY DISORDER (ADHD), COMBINED TYPE: Primary | ICD-10-CM

## 2024-11-26 DIAGNOSIS — F41.1 GENERALIZED ANXIETY DISORDER: ICD-10-CM

## 2024-11-26 DIAGNOSIS — F33.40 MAJOR DEPRESSIVE DISORDER, RECURRENT EPISODE, IN REMISSION WITH SEASONAL PATTERN (H): ICD-10-CM

## 2024-11-26 PROCEDURE — 99214 OFFICE O/P EST MOD 30 MIN: CPT | Mod: 95

## 2024-11-26 ASSESSMENT — PATIENT HEALTH QUESTIONNAIRE - PHQ9
SUM OF ALL RESPONSES TO PHQ QUESTIONS 1-9: 2
10. IF YOU CHECKED OFF ANY PROBLEMS, HOW DIFFICULT HAVE THESE PROBLEMS MADE IT FOR YOU TO DO YOUR WORK, TAKE CARE OF THINGS AT HOME, OR GET ALONG WITH OTHER PEOPLE: NOT DIFFICULT AT ALL
SUM OF ALL RESPONSES TO PHQ QUESTIONS 1-9: 2

## 2024-11-26 NOTE — Clinical Note
"Aura Herrera, great note, perfect length, and definitely an adequate communication tool for a handoff.   Housekeeping items that I fixed:  -  can be used ALMOST all the time, but only when we are planning to see a patient again. So I removed it here.  Also, I wasn't sure about the statement \"1 problem that isn't stable\", since she sounds pretty stable. So important to know, another justification in the drop down for 58470 is \"2 problems that are stable\", so I replaced it with that.   - Under the plan for psychotherapy, it should never say \"None\". That is assessment, not plan. You could say \"Not needed at this time\", or since this is future recommendations as well, I included a statement of \"would recommend if symptoms worsen in the future\"   - Generic names are lower case and brand names are capitalized! "

## 2024-11-26 NOTE — PROGRESS NOTES
Provider note locked.    Virtual Visit Details    Type of service:  Video Visit   Start:836  End: 843    Originating Location (pt. Location): Home  {PROVIDER LOCATION On-site should be selected for visits conducted from your clinic location or adjoining [facility] hospital, academic office, or other nearby [facility] building. Off-site should be selected for all other provider locations, including home:690330}  Distant Location (provider location):  On-site  Platform used for Video Visit: [employer]TO DIGIT[employer]

## 2024-11-26 NOTE — NURSING NOTE
Current patient location: [address] [place] [address] [address] [place] [address]    Is the patient currently in the state of [place]? YES    Visit mode:VIDEO    If the visit is dropped, the patient can be reconnected by:VIDEO VISIT: Text to cell phone:   Telephone Information:   Mobile [phone]       Will anyone else be joining the visit? NO  (If patient encounters technical issues they should call [phone] :[phone])    Are changes needed to the allergy or medication list? No    Are refills needed on medications prescribed by this physician? NO    Rooming Documentation:  Questionnaire(s) completed    Reason for visit: RECHECK    Cheyenne REEDF

## 2024-11-26 NOTE — PATIENT INSTRUCTIONS
Treatment plan:    Medications:  - Continue lisdexamfetamine (vyvanse) 50 mg daily  - continue amphetamine-dextroamphetamine (adderall)10 mg as needed for attentional coverage in the afternoon  - continue duloxetine 30 mg daily    We confirmed with Dr. Mclain that she will take over prescribing of your medications- please schedule a medication check appointment with her at your earliest convenience.    **For crisis resources, please see the information at the end of this document**   Patient Education    Thank you for coming to the [facility] [place] [facility].     Lab Testing:  If you had lab testing today and your results are reassuring or normal they will be mailed to you or sent through "[employer] within 7 days. If the lab tests need quick action we will call you with the results. The phone number we will call with results is # [phone]. If this is not the best number please call our clinic and change the number.     Medication Refills:  If you need any refills please call your pharmacy and they will contact us. Our fax number for refills is [phone].   Three business days of notice are needed for general medication refill requests.   Five business days of notice are needed for controlled substance refill requests.   If you need to change to a different pharmacy, please contact the new pharmacy directly. The new pharmacy will help you get your medications transferred.     Contact Us:  Please call [phone] during business hours (8-5:00 M-F).   If you have medication related questions after clinic hours, or on the weekend, please call [phone].     Financial Assistance [phone]   Medical Records [phone]       MENTAL HEALTH CRISIS RESOURCES:  For a emergency help, please call 911 or go to the nearest Emergency Department.     Emergency Walk-In Options:   EmPATH Unit @ [place] [facility] ([place]): [phone] - Specialized mental health emergency area designed  to be calming  [facility] [place] ([place]): [phone]  [facility] ([place]): [phone]  [facility] ([place]): [phone]    County Crisis Information:   [place]: [phone]  [place]: [phone]  [place] ([place]) - Adult: [phone]     Child: [phone]  [place] - Adult: [phone]     Child: [phone]  [place]: [phone]  List of all [facility] resources:   https://[place].gov/dhs/people-we-serve/adults/health-care/mental-health/resources/crisis-contacts.jsp    National Crisis Information:   Crisis Text Line: Text  [place]  to 806253  Suicide & Crisis Lifeline: 988  National Suicide Prevention Lifeline: 4-515-129-TALK (1-311.265.7611)       For online chat options, visit https://suicidepreventionlifeline.org/chat/  Poison Control Center: 1-531.730.3585  Trans Lifeline: 1-618.520.6808 - Hotline for transgender people of all ages  The [employer]: 1-615.463.9885 - Hotline for LGBT youth     For Non-Emergency Support:   Fast Tracker: Mental Health & Substance Use Disorder Resources -   https://www.[employer].org/

## 2024-11-27 RX ORDER — LISDEXAMFETAMINE DIMESYLATE 50 MG/1
50 CAPSULE ORAL DAILY
Qty: 30 CAPSULE | Refills: 0 | Status: SHIPPED | OUTPATIENT
Start: 2025-02-14 | End: 2025-03-16

## 2024-11-27 RX ORDER — DEXTROAMPHETAMINE SACCHARATE, AMPHETAMINE ASPARTATE, DEXTROAMPHETAMINE SULFATE AND AMPHETAMINE SULFATE 2.5; 2.5; 2.5; 2.5 MG/1; MG/1; MG/1; MG/1
10 TABLET ORAL DAILY PRN
Qty: 30 TABLET | Refills: 0 | Status: SHIPPED | OUTPATIENT
Start: 2024-12-14 | End: 2025-01-13

## 2024-11-27 RX ORDER — DEXTROAMPHETAMINE SACCHARATE, AMPHETAMINE ASPARTATE, DEXTROAMPHETAMINE SULFATE AND AMPHETAMINE SULFATE 2.5; 2.5; 2.5; 2.5 MG/1; MG/1; MG/1; MG/1
10 TABLET ORAL DAILY PRN
Qty: 30 TABLET | Refills: 0 | Status: SHIPPED | OUTPATIENT
Start: 2025-02-14 | End: 2025-03-16

## 2024-11-27 RX ORDER — DULOXETIN HYDROCHLORIDE 30 MG/1
30 CAPSULE, DELAYED RELEASE ORAL DAILY
Qty: 30 CAPSULE | Refills: 3 | Status: SHIPPED | OUTPATIENT
Start: 2024-11-27 | End: 2024-12-07

## 2024-11-27 RX ORDER — LISDEXAMFETAMINE DIMESYLATE 50 MG/1
50 CAPSULE ORAL DAILY
Qty: 30 CAPSULE | Refills: 0 | Status: SHIPPED | OUTPATIENT
Start: 2024-12-14 | End: 2025-01-13

## 2024-11-27 RX ORDER — DEXTROAMPHETAMINE SACCHARATE, AMPHETAMINE ASPARTATE, DEXTROAMPHETAMINE SULFATE AND AMPHETAMINE SULFATE 2.5; 2.5; 2.5; 2.5 MG/1; MG/1; MG/1; MG/1
10 TABLET ORAL DAILY PRN
Qty: 30 TABLET | Refills: 0 | Status: SHIPPED | OUTPATIENT
Start: 2025-01-14 | End: 2025-02-13

## 2024-11-27 RX ORDER — LISDEXAMFETAMINE DIMESYLATE 50 MG/1
50 CAPSULE ORAL DAILY
Qty: 30 CAPSULE | Refills: 0 | Status: SHIPPED | OUTPATIENT
Start: 2025-01-14 | End: 2025-02-13

## 2024-12-01 RX ORDER — DEXTROAMPHETAMINE SACCHARATE, AMPHETAMINE ASPARTATE, DEXTROAMPHETAMINE SULFATE AND AMPHETAMINE SULFATE 2.5; 2.5; 2.5; 2.5 MG/1; MG/1; MG/1; MG/1
10 TABLET ORAL DAILY PRN
Qty: 30 TABLET | Refills: 0 | Status: SHIPPED | OUTPATIENT
Start: 2025-03-17

## 2024-12-01 RX ORDER — LISDEXAMFETAMINE DIMESYLATE 50 MG/1
50 CAPSULE ORAL EVERY MORNING
Qty: 30 CAPSULE | Refills: 0 | Status: SHIPPED | OUTPATIENT
Start: 2025-03-17

## 2024-12-07 RX ORDER — DULOXETIN HYDROCHLORIDE 30 MG/1
30 CAPSULE, DELAYED RELEASE ORAL DAILY
Qty: 30 CAPSULE | Refills: 4 | Status: SHIPPED | OUTPATIENT
Start: 2024-12-07

## 2024-12-23 ENCOUNTER — TRANSFERRED RECORDS (OUTPATIENT)
Dept: HEALTH INFORMATION MANAGEMENT | Facility: CLINIC | Age: 44
End: 2024-12-23

## 2025-01-10 ENCOUNTER — TRANSFERRED RECORDS (OUTPATIENT)
Dept: HEALTH INFORMATION MANAGEMENT | Facility: CLINIC | Age: 45
End: 2025-01-10

## 2025-01-28 ENCOUNTER — LAB (OUTPATIENT)
Dept: LAB | Facility: CLINIC | Age: 45
End: 2025-01-28

## 2025-01-28 DIAGNOSIS — Z84.89 FAMILY HISTORY OF GENETIC DISEASE: Primary | ICD-10-CM

## 2025-01-28 DIAGNOSIS — Z84.89 FAMILY HISTORY OF GENETIC DISEASE: ICD-10-CM

## 2025-01-28 PROCEDURE — 36415 COLL VENOUS BLD VENIPUNCTURE: CPT

## 2025-01-28 NOTE — PROGRESS NOTES
Name: Angelic Marrero   : 1980  MRN: [id number]  Date of Service: 2025  PCP: Ofelia Mclain    Presenting information:   Angelic is a 44 year old female who was seen to consent for exome sequencing to aid in interpretation of her child's sample.  I discussed the testing with Angelic in person at her child's appointment.    Medical History:   Angelic does not have any similar symptoms to her child.      Past Medical History:   Diagnosis Date    Allergic rhinitis, cause unspecified     AIT    Generalized anxiety disorder     Saw therapist at  in [place] over summer 2015.  Doing well now.  Also some marriage therpay in the past.  Tried prozac last spring and another med and didn't like the side effects -- stopped within 1 mo of starting.     Menstrual migraine     Other forms of migraine, without mention of intractable migraine without mention of status migrainosus     Postpartum depression     Rh negative state in antepartum period 2016    Rhophylac given 3/7/16    Seasonal allergies     Varicella        Family History: A three generation pedigree was obtained at Angelic's child's visit and scanned into the EMR.     Discussion: Angelic consented to provide samples to aid in interpretation of the proband's exome sequencing. This test can identify familial relationships. The potential results were discussed including a positive, negative, or variant of uncertain significance. Familial samples would be used to determine how it was inherited, if at all. A report will not be issued for Angelic separate from proband's report. Communication of results is directly to proband's medical decision-makers. They may choose to share with information with family members. If the change is believed to be pathogenic, it can alter clinical management for Angelic and provide recurrence risk information. Testing other family members or pregnancies can be considered. Communication of this  "information depends on the medical decision-makers for proband.     Angelic understood the purpose of the testing.     ACMG Secondary Findings  We reviewed that the lab can report the results of gene mutations that are found in genes recommended by the American College of Medical Genetics and Genomics (ACMG) to be reported to ES patients even if the gene variant does not contribute to their current symptoms.  Many of these gene changes may not be associated with symptoms until adulthood and are not traditionally tested for in children, but may lead to medical management changes. Examples include genes related to increased cancer risk, heart conditions, or metabolic conditions. In addition, relative status for a change in one of the secondary findings genes may sought from Angelic's results.      We discussed that there are insurance implications related to these findings in terms of life, short term disability, and long term disability insurance. There is a federal law in place at the moment, The Genetic Information Nondiscrimination Act or ARELI (2008) that protects again health insurance discrimination.  Health insurance protections do not apply to members of the [place]  who receive care through [employer], Veterans receiving care through the VA, the [facility], or federal employees who receive care through Federal Employees Health Benefits Plan. Employers may not discriminate (hiring, firing, promotions etc.), based on genetic information. This only applies to companies with 15 or more employees. It does not apply to federal employees, or , which have their own nondiscrimination protections in place. Employers may have \"voluntary\" health services such as employee wellness programs that request genetic information or family history, which is not a violation of ARELI.     At this time, the family elected to take some time to think about this option; they will follow up with me and I will amend " this note accordingly. If the family does not follow up, they will automatically be opted OUT.    Consent/Insurance Coverage for ES  Angelic provided informed consent for the testing. They will not be receiving individual reports from this test. All samples must be received within three weeks of each other. Additional questions or concerns were denied.    PLAN  1. The purpose and process of exome sequencing (ES) was reviewed today.  2. After reviewing the risks, benefits, and limitations of genetic testing, consent was obtained for exome sequencing for Angelic via a blood sample (collected in clinic today).  3. Angelic will not receive a report for this test.  4. Contact information was provided to the family.  Additional questions or concerns were denied.    Soniya Guallpa MS, [facility]  Genetic Counseling  [facility]  Email: [email or web address]  Phone: [phone]  Fax: [phone]

## 2025-02-04 LAB
LAB TEST RESULTS REPORTED IN RPTPERIOD: NORMAL
SPECIMEN TYPE: NORMAL

## 2025-02-24 ENCOUNTER — TRANSFERRED RECORDS (OUTPATIENT)
Dept: HEALTH INFORMATION MANAGEMENT | Facility: CLINIC | Age: 45
End: 2025-02-24

## 2025-03-05 ENCOUNTER — MYC REFILL (OUTPATIENT)
Dept: PSYCHIATRY | Facility: CLINIC | Age: 45
End: 2025-03-05

## 2025-03-05 DIAGNOSIS — F41.1 GENERALIZED ANXIETY DISORDER: ICD-10-CM

## 2025-03-05 DIAGNOSIS — F33.40: ICD-10-CM

## 2025-03-05 RX ORDER — DULOXETIN HYDROCHLORIDE 30 MG/1
30 CAPSULE, DELAYED RELEASE ORAL DAILY
Qty: 90 CAPSULE | Refills: 0 | Status: SHIPPED | OUTPATIENT
Start: 2025-03-05

## 2025-03-05 NOTE — TELEPHONE ENCOUNTER
Last seen: 11/26  RTC: with primary care provider for further management of psychiatric medication   Any patient initiated cancellations or no shows since last visit? No  Next appt: None  Last filled: 12/9 90 d/s  Writer spoke with [facility] pharmacy and confirmed that patient currently has a 30 d/s of this medication available but patient usually fills 90 d/s and that is what the patient is requesting.     Incoming refill from patient via [employer] by patient or caregiver    Medication requested:   Pending Prescriptions:                       Disp   Refills    DULoxetine (CYMBALTA) 30 MG capsule       30 cap*4            Sig: Take 1 capsule (30 mg) by mouth daily.      From chart note:   1) Medications:   - Continue lisdexamfetamine (Vyvanse) 50 mg daily  - Continue amphetamine-dextroamphetamine (Adderall) 10 mg in the afternoon  - Continue duloxetine 30 mg daily    Is note signed/closed? Yes    Is this a 90 day request for a psych medication? YES -     LPNs - Please check  if controlled and send to provider  Others - Send to RNs

## 2025-03-17 ENCOUNTER — MYC REFILL (OUTPATIENT)
Dept: PSYCHIATRY | Facility: CLINIC | Age: 45
End: 2025-03-17

## 2025-03-17 DIAGNOSIS — F90.2 ATTENTION DEFICIT HYPERACTIVITY DISORDER (ADHD), COMBINED TYPE: ICD-10-CM

## 2025-03-17 RX ORDER — LISDEXAMFETAMINE DIMESYLATE 50 MG/1
50 CAPSULE ORAL EVERY MORNING
Qty: 30 CAPSULE | Refills: 0 | Status: CANCELLED | OUTPATIENT
Start: 2025-03-17

## 2025-03-25 ENCOUNTER — OFFICE VISIT (OUTPATIENT)
Dept: FAMILY MEDICINE | Facility: CLINIC | Age: 45
End: 2025-03-25
Payer: COMMERCIAL

## 2025-03-25 VITALS
BODY MASS INDEX: 24.32 KG/M2 | TEMPERATURE: 98 F | HEART RATE: 69 BPM | RESPIRATION RATE: 16 BRPM | SYSTOLIC BLOOD PRESSURE: 122 MMHG | DIASTOLIC BLOOD PRESSURE: 77 MMHG | OXYGEN SATURATION: 100 % | WEIGHT: 146 LBS | HEIGHT: 65 IN

## 2025-03-25 DIAGNOSIS — Z13.220 LIPID SCREENING: ICD-10-CM

## 2025-03-25 DIAGNOSIS — F90.2 ATTENTION DEFICIT HYPERACTIVITY DISORDER (ADHD), COMBINED TYPE: ICD-10-CM

## 2025-03-25 DIAGNOSIS — Z79.899 CONTROLLED SUBSTANCE AGREEMENT SIGNED: ICD-10-CM

## 2025-03-25 DIAGNOSIS — Z12.31 ENCOUNTER FOR SCREENING MAMMOGRAM FOR BREAST CANCER: ICD-10-CM

## 2025-03-25 DIAGNOSIS — Z12.11 COLON CANCER SCREENING: ICD-10-CM

## 2025-03-25 DIAGNOSIS — F41.1 GENERALIZED ANXIETY DISORDER: ICD-10-CM

## 2025-03-25 DIAGNOSIS — F33.40: ICD-10-CM

## 2025-03-25 DIAGNOSIS — Z00.00 ANNUAL PHYSICAL EXAM: Primary | ICD-10-CM

## 2025-03-25 DIAGNOSIS — Z30.41 ENCOUNTER FOR SURVEILLANCE OF CONTRACEPTIVE PILLS: ICD-10-CM

## 2025-03-25 DIAGNOSIS — N92.1 METRORRHAGIA: ICD-10-CM

## 2025-03-25 LAB
AMPHETAMINES UR QL SCN: ABNORMAL
BARBITURATES UR QL SCN: ABNORMAL
BENZODIAZ UR QL SCN: ABNORMAL
BZE UR QL SCN: ABNORMAL
CANNABINOIDS UR QL SCN: ABNORMAL
FENTANYL UR QL: ABNORMAL
OPIATES UR QL SCN: ABNORMAL
PCP QUAL URINE (ROCHE): ABNORMAL

## 2025-03-25 PROCEDURE — 80307 DRUG TEST PRSMV CHEM ANLYZR: CPT | Performed by: FAMILY MEDICINE

## 2025-03-25 PROCEDURE — 99396 PREV VISIT EST AGE 40-64: CPT | Performed by: FAMILY MEDICINE

## 2025-03-25 PROCEDURE — G2211 COMPLEX E/M VISIT ADD ON: HCPCS | Performed by: FAMILY MEDICINE

## 2025-03-25 PROCEDURE — 36415 COLL VENOUS BLD VENIPUNCTURE: CPT | Performed by: FAMILY MEDICINE

## 2025-03-25 PROCEDURE — 96127 BRIEF EMOTIONAL/BEHAV ASSMT: CPT | Performed by: FAMILY MEDICINE

## 2025-03-25 PROCEDURE — 3078F DIAST BP <80 MM HG: CPT | Performed by: FAMILY MEDICINE

## 2025-03-25 PROCEDURE — 3074F SYST BP LT 130 MM HG: CPT | Performed by: FAMILY MEDICINE

## 2025-03-25 PROCEDURE — 80061 LIPID PANEL: CPT | Performed by: FAMILY MEDICINE

## 2025-03-25 PROCEDURE — 99214 OFFICE O/P EST MOD 30 MIN: CPT | Mod: 25 | Performed by: FAMILY MEDICINE

## 2025-03-25 RX ORDER — DEXTROAMPHETAMINE SACCHARATE, AMPHETAMINE ASPARTATE, DEXTROAMPHETAMINE SULFATE AND AMPHETAMINE SULFATE 2.5; 2.5; 2.5; 2.5 MG/1; MG/1; MG/1; MG/1
10 TABLET ORAL DAILY PRN
Qty: 30 TABLET | Refills: 0 | Status: SHIPPED | OUTPATIENT
Start: 2025-03-25

## 2025-03-25 RX ORDER — DULOXETIN HYDROCHLORIDE 30 MG/1
30 CAPSULE, DELAYED RELEASE ORAL DAILY
Qty: 90 CAPSULE | Refills: 4 | Status: SHIPPED | OUTPATIENT
Start: 2025-03-25

## 2025-03-25 RX ORDER — LISDEXAMFETAMINE DIMESYLATE 50 MG/1
50 CAPSULE ORAL EVERY MORNING
Qty: 30 CAPSULE | Refills: 0 | Status: SHIPPED | OUTPATIENT
Start: 2025-04-16

## 2025-03-25 RX ORDER — NORETHINDRONE AND ETHINYL ESTRADIOL 1 MG-35MCG
KIT ORAL
Qty: 112 TABLET | Refills: 4 | Status: SHIPPED | OUTPATIENT
Start: 2025-03-25

## 2025-03-25 SDOH — HEALTH STABILITY: PHYSICAL HEALTH: ON AVERAGE, HOW MANY DAYS PER WEEK DO YOU ENGAGE IN MODERATE TO STRENUOUS EXERCISE (LIKE A BRISK WALK)?: 3 DAYS

## 2025-03-25 SDOH — HEALTH STABILITY: PHYSICAL HEALTH: ON AVERAGE, HOW MANY MINUTES DO YOU ENGAGE IN EXERCISE AT THIS LEVEL?: 50 MIN

## 2025-03-25 ASSESSMENT — SOCIAL DETERMINANTS OF HEALTH (SDOH): HOW OFTEN DO YOU GET TOGETHER WITH FRIENDS OR RELATIVES?: ONCE A WEEK

## 2025-03-25 NOTE — PATIENT INSTRUCTIONS
Patient Education   Preventive Care Advice   This is general advice given by our system to help you stay healthy. However, your care team may have specific advice just for you. Please talk to your care team about your preventive care needs.  Nutrition  Eat 5 or more servings of fruits and vegetables each day.  Try wheat bread, brown rice and whole grain pasta (instead of white bread, rice, and pasta).  Get enough calcium and vitamin D. Check the label on foods and aim for 100% of the RDA (recommended daily allowance).  Lifestyle  Exercise at least 150 minutes each week  (30 minutes a day, 5 days a week).  Do muscle strengthening activities 2 days a week. These help control your weight and prevent disease.  No smoking.  Wear sunscreen to prevent skin cancer.  Have a dental exam and cleaning every 6 months.  Yearly exams  See your health care team every year to talk about:  Any changes in your health.  Any medicines your care team has prescribed.  Preventive care, family planning, and ways to prevent chronic diseases.  Shots (vaccines)   HPV shots (up to age 26), if you've never had them before.  Hepatitis B shots (up to age 59), if you've never had them before.  COVID-19 shot: Get this shot when it's due.  Flu shot: Get a flu shot every year.  Tetanus shot: Get a tetanus shot every 10 years.  Pneumococcal, hepatitis A, and RSV shots: Ask your care team if you need these based on your risk.  Shingles shot (for age 50 and up)  General health tests  Diabetes screening:  Starting at age 35, Get screened for diabetes at least every 3 years.  If you are younger than age 35, ask your care team if you should be screened for diabetes.  Cholesterol test: At age 39, start having a cholesterol test every 5 years, or more often if advised.  Bone density scan (DEXA): At age 50, ask your care team if you should have this scan for osteoporosis (brittle bones).  Hepatitis C: Get tested at least once in your life.  STIs (sexually  transmitted infections)  Before age 24: Ask your care team if you should be screened for STIs.  After age 24: Get screened for STIs if you're at risk. You are at risk for STIs (including HIV) if:  You are sexually active with more than one person.  You don't use condoms every time.  You or a partner was diagnosed with a sexually transmitted infection.  If you are at risk for HIV, ask about PrEP medicine to prevent HIV.  Get tested for HIV at least once in your life, whether you are at risk for HIV or not.  Cancer screening tests  Cervical cancer screening: If you have a cervix, begin getting regular cervical cancer screening tests starting at age 21.  Breast cancer scan (mammogram): If you've ever had breasts, begin having regular mammograms starting at age 40. This is a scan to check for breast cancer.  Colon cancer screening: It is important to start screening for colon cancer at age 45.  Have a colonoscopy test every 10 years (or more often if you're at risk) Or, ask your provider about stool tests like a FIT test every year or Cologuard test every 3 years.  To learn more about your testing options, visit:   .  For help making a decision, visit:   https://bit.ly/zn69211.  Prostate cancer screening test: If you have a prostate, ask your care team if a prostate cancer screening test (PSA) at age 55 is right for you.  Lung cancer screening: If you are a current or former smoker ages 50 to 80, ask your care team if ongoing lung cancer screenings are right for you.  For informational purposes only. Not to replace the advice of your health care provider. Copyright   2023 [facility]. All rights reserved. Clinically reviewed by the [facility]. [employer] [id number] - REV 01/24.  Learning About Stress  What is stress?     Stress is your body's response to a hard situation. Your body can have a physical, emotional, or mental response. Stress is a fact of life for most people, and it  affects everyone differently. What causes stress for you may not be stressful for someone else.  A lot of things can cause stress. You may feel stress when you go on a job interview, take a test, or run a race. This kind of short-term stress is normal and even useful. It can help you if you need to work hard or react quickly. For example, stress can help you finish an important job on time.  Long-term stress is caused by ongoing stressful situations or events. Examples of long-term stress include long-term health problems, ongoing problems at work, or conflicts in your family. Long-term stress can harm your health.  How does stress affect your health?  When you are stressed, your body responds as though you are in danger. It makes hormones that speed up your heart, make you breathe faster, and give you a burst of energy. This is called the fight-or-flight stress response. If the stress is over quickly, your body goes back to normal and no harm is done.  But if stress happens too often or lasts too long, it can have bad effects. Long-term stress can make you more likely to get sick, and it can make symptoms of some diseases worse. If you tense up when you are stressed, you may develop neck, shoulder, or low back pain. Stress is linked to high blood pressure and heart disease.  Stress also harms your emotional health. It can make you del cid, tense, or depressed. Your relationships may suffer, and you may not do well at work or school.  What can you do to manage stress?  You can try these things to help manage stress:   Do something active. Exercise or activity can help reduce stress. Walking is a great way to get started. Even everyday activities such as housecleaning or yard work can help.  Try yoga or basilio chi. These techniques combine exercise and meditation. You may need some training at first to learn them.  Do something you enjoy. For example, listen to music or go to a movie. Practice your hobby or do volunteer  "work.  Meditate. This can help you relax, because you are not worrying about what happened before or what may happen in the future.  Do guided imagery. Imagine yourself in any setting that helps you feel calm. You can use online videos, books, or a teacher to guide you.  Do breathing exercises. For example:  From a standing position, bend forward from the waist with your knees slightly bent. Let your arms dangle close to the floor.  Breathe in slowly and deeply as you return to a standing position. Roll up slowly and lift your head last.  Hold your breath for just a few seconds in the standing position.  Breathe out slowly and bend forward from the waist.  Let your feelings out. Talk, laugh, cry, and express anger when you need to. Talking with supportive friends or family, a counselor, or a jaquelin leader about your feelings is a healthy way to relieve stress. Avoid discussing your feelings with people who make you feel worse.  Write. It may help to write about things that are bothering you. This helps you find out how much stress you feel and what is causing it. When you know this, you can find better ways to cope.  What can you do to prevent stress?  You might try some of these things to help prevent stress:  Manage your time. This helps you find time to do the things you want and need to do.  Get enough sleep. Your body recovers from the stresses of the day while you are sleeping.  Get support. Your family, friends, and community can make a difference in how you experience stress.  Limit your news feed. Avoid or limit time on social media or news that may make you feel stressed.  Do something active. Exercise or activity can help reduce stress. Walking is a great way to get started.  Where can you learn more?  Go to https://www.[employer].net/patiented  Enter N032 in the search box to learn more about \"Learning About Stress.\"  Current as of: October 24, 2024  Content Version: 14.4 2024-2025 [employer].   Care instructions adapted under license by your healthcare professional. If you have questions about a medical condition or this instruction, always ask your healthcare professional. [employer] disclaims any warranty or liability for your use of this information.

## 2025-03-25 NOTE — PROGRESS NOTES
Preventive Care Visit  [facility]  Ofelia Mclain DO, Family Medicine  Mar 25, 2025      Assessment & Plan     1. Annual physical exam (Primary)  Reviewed health history and health maintenance recommendations.    2. Controlled substance agreement signed 3/25/25  3. Attention deficit hyperactivity disorder (ADHD), combined type  - lisdexamfetamine (VYVANSE) 50 MG capsule; Take 1 capsule (50 mg) by mouth every morning.  Dispense: 30 capsule; Refill: 0  - amphetamine-dextroamphetamine (ADDERALL) 10 MG tablet; Take 1 tablet (10 mg) by mouth daily as needed (attentional coverage).  Dispense: 30 tablet; Refill: 0  - Urine Drug Screen; Future    Transferring care from psychiatry to primary care.  Continue Vyvanse and Adderall.  Reviewed controlled substance agreement protocol, update urine drug screen.  Follow-up 6 months controlled substance med check.    4. Generalized anxiety disorder  5. Major depressive disorder, recurrent episode, in remission with seasonal pattern  - DULoxetine (CYMBALTA) 30 MG capsule; Take 1 capsule (30 mg) by mouth daily.  Dispense: 90 capsule; Refill: 4    Content with current mood control, continue Cymbalta 30 mg daily.    6. Metrorrhagia  7. Encounter for surveillance of contraceptive pills  - norethindrone-ethinyl estradiol (ALAYCEN 1/35) 1-35 MG-MCG tablet; TAKE 1 TABLET BY MOUTH DAILY USE CONTINUOUSLY TO HAVE MENSES EVERY 3 MONTHS  Dispense: 112 tablet; Refill: 4    Doing well with continuous birth control pills, denies any side effects or symptoms, no new contraindications.    8. Lipid screening  - Lipid panel reflex to direct LDL Fasting; Future    9. Encounter for screening mammogram for breast cancer  - MA Screen Bilateral w/Matt; Future    Coming due for surveillance.     10. Colon cancer screening  - COLOGUARD(EX[employer]ES); Future      Will complete after her 45th birthday.       Patient has been advised of split billing requirements and indicates  understanding: Yes        Counseling  Appropriate preventive services were addressed with this patient via screening, questionnaire, or discussion as appropriate for fall prevention, nutrition, physical activity, Tobacco-use cessation, social engagement, weight loss and cognition.  Checklist reviewing preventive services available has been given to the patient.  Reviewed patient's diet, addressing concerns and/or questions.   She is at risk for lack of exercise and has been provided with information to increase physical activity for the benefit of her well-being.   She is at risk for psychosocial distress and has been provided with information to reduce risk.       The longitudinal plan of care for the diagnosis(es)/condition(s) as documented were addressed during this visit. Due to the added complexity in care, I will continue to support Saba in the subsequent management and with ongoing continuity of care.       Subjective   Saba is a 44 year old, presenting for the following:  Physical        3/25/2025    12:04 PM   Additional Questions   Roomed by Jemal BROOKE    Annual physical exam (Primary)  - mammo: 5/2/24 - normal    - pap: 12/1/20 - normal pap,  neg HPV - due in December     Sees dermatology for skin checks      Body mass index is 24.3 kg/m .  Wt Readings from Last 4 Encounters:   03/25/25 66.2 kg (146 lb)   11/26/24 64.9 kg (143 lb)   06/26/24 66.7 kg (147 lb)   04/12/24 66.7 kg (147 lb 2 oz)       Attention deficit hyperactivity disorder (ADHD), combined type  Generalized anxiety disorder  Major depressive disorder, recurrent episode, in remission with seasonal pattern      6/26/2024    10:33 AM 8/20/2024     8:47 AM 11/26/2024     8:07 AM   PHQ   PHQ-9 Total Score 1 4 2    Q9: Thoughts of better off dead/self-harm past 2 weeks Not at all Not at all Not at all       Patient-reported         2/29/2024     9:15 AM 4/12/2024     2:04 PM 6/26/2024    10:34 AM   NAYELY-7 SCORE   Total Score 12  (moderate anxiety) 9 (mild anxiety) 3 (minimal anxiety)   Total Score 12 9 3     Currently managing on Cymbalta 30 mg daily, Vyvanse 50 mg in the morning, as needed Adderall 10 mg for additional coverage.  Staff message from psychiatry says they are handing off medication management to primary care.      Metrorrhagia  Encounter for surveillance of contraceptive pills  Scheduled OCPs, taking continuously, doing well.        Advance Care Planning  Patient does not have a Health Care Directive: Discussed advance care planning with patient; however, patient declined at this time.        3/25/2025   General Health   How would you rate your overall physical health? Good   Feel stress (tense, anxious, or unable to sleep) To some extent   (!) STRESS CONCERN      3/25/2025   Nutrition   Three or more servings of calcium each day? Yes   Diet: Regular (no restrictions)   How many servings of fruit and vegetables per day? (!) 2-3   How many sweetened beverages each day? 0-1         3/25/2025   Exercise   Days per week of moderate/strenous exercise 3 days   Average minutes spent exercising at this level 50 min         3/25/2025   Social Factors   Frequency of gathering with friends or relatives Once a week   Worry food won't last until get money to buy more No   Food not last or not have enough money for food? No   Do you have housing? (Housing is defined as stable permanent housing and does not include staying ouside in a car, in a tent, in an abandoned building, in an overnight shelter, or couch-surfing.) Yes   Are you worried about losing your housing? No   Lack of transportation? No   Unable to get utilities (heat,electricity)? No         3/25/2025   Dental   Dentist two times every year? Yes           4/12/2024   TB Screening   Were you born outside of the [place]? No           Today's PHQ-9 Score:       11/26/2024     8:07 AM   PHQ-9 SCORE   PHQ-9 Total Score MyChart 2 (Minimal depression)   PHQ-9 Total Score 2         Patient-reported           3/25/2025   Substance Use   Alcohol more than 3/day or more than 7/wk No   Do you use any other substances recreationally? No     Social History     Tobacco Use    Smoking status: Never     Passive exposure: Never    Smokeless tobacco: Never   Vaping Use    Vaping status: Never Used   Substance Use Topics    Alcohol use: Yes    Drug use: Never           5/3/2023   LAST FHS-7 RESULTS   1st degree relative breast or ovarian cancer No   Any relative bilateral breast cancer No   Any male have breast cancer No   Any ONE woman have BOTH breast AND ovarian cancer No   Any woman with breast cancer before 50yrs No   2 or more relatives with breast AND/OR ovarian cancer No   2 or more relatives with breast AND/OR bowel cancer No        Mammogram Screening - Mammogram every 1-2 years updated in Health Maintenance based on mutual decision making        3/25/2025   STI Screening   New sexual partner(s) since last STI/HIV test? No     History of abnormal Pap smear: No - age 30- 64 PAP with HPV every 5 years recommended        Latest Ref Rng & Units 12/1/2020     4:51 PM 8/10/2015     4:50 PM 12/19/2006    12:00 AM   PAP / HPV   PAP Negative for squamous intraepithelial lesion or malignancy. Negative for squamous intraepithelial lesion or malignancy  Electronically signed by Nancy Arce CT (ASCP) on 12/8/2020 at  3:14 PM    Negative for squamous intraepithelial lesion or malignancy  Electronically signed by Kristy Holder CT (ASCP) on 8/20/2015 at  3:44 PM       PAP (Historical)    NIL    HPV 16 DNA NEG Negative      HPV 18 DNA NEG Negative      Other HR HPV NEG Negative          ASCVD Risk   The 10-year ASCVD risk score (Damaris SHERWOOD, et al., 2019) is: 0.5%    Values used to calculate the score:      Age: 44 years      Sex: Female      Is Non- : No      Diabetic: No      Tobacco smoker: No      Systolic Blood Pressure: 122 mmHg      Is BP treated: No      HDL  "Cholesterol: 59 mg/dL      Total Cholesterol: 180 mg/dL        3/25/2025   Contraception/Family Planning   Questions about contraception or family planning No        Reviewed and updated as needed this visit by Provider   Tobacco  Allergies  Meds  Problems  Med Hx  Surg Hx  Fam Hx            Review of Systems  Constitutional, HEENT, cardiovascular, pulmonary, GI, , musculoskeletal, neuro, skin, endocrine and psych systems are negative, except as otherwise noted.     Objective    Exam  /77   Pulse 69   Temp 98  F (36.7  C) (Oral)   Resp 16   Ht 1.651 m (5' 5\")   Wt 66.2 kg (146 lb)   SpO2 100%   BMI 24.30 kg/m     Estimated body mass index is 24.3 kg/m  as calculated from the following:    Height as of this encounter: 1.651 m (5' 5\").    Weight as of this encounter: 66.2 kg (146 lb).    Physical Exam    GENERAL: alert and no distress  EYES: Eyes grossly normal to inspection, PERRL and conjunctivae and sclerae normal  HENT: ear canals and TM's normal, nose and mouth without ulcers or lesions  NECK: no adenopathy, no asymmetry, masses, or scars  RESP: lungs clear to auscultation - no rales, rhonchi or wheezes  CV: regular rate and rhythm, normal S1 S2, no S3 or S4, no murmur, click or rub, no peripheral edema  ABDOMEN: soft, nontender, no hepatosplenomegaly, no masses and bowel sounds normal  MS: no gross musculoskeletal defects noted, no edema  SKIN: no suspicious lesions or rashes  NEURO: Normal strength and tone, mentation intact and speech normal  PSYCH: mentation appears normal, affect normal/bright        Signed Electronically by: Ofelia Mclain, DO    "

## 2025-03-25 NOTE — LETTER
[facility]  03/25/25  Patient: Angelic Marrero  YOB: 1980  Medical Record Number: [id number]                                                                                  Non-Opioid Controlled Substance Agreement    This is an agreement between you and your provider regarding safe and appropriate use of controlled substances prescribed by your care team. Controlled substances are?medicines that can cause physical and mental dependence (abuse).     There are strict laws about having and using these medicines. We here at [facility] are  committed to working with you in your efforts to get better. To support you in this work, we'll help you schedule regular office appointments for medicine refills. If we must cancel or change your appointment for any reason, we'll make sure you have enough medicine to last until your next appointment.     As a Provider, I will:   Listen carefully to your concerns while treating you with respect.   Recommend a treatment plan that I believe is in your best interest and may involve therapies other than medicine.    Talk with you often about the possible benefits and the risk of harm of any medicine that we prescribe for you.  Assess the safety of this medicine and check how well it works.    Provide a plan on how to taper (discontinue or go off) using this medicine if the decision is made to stop its use.      ::  As a Patient, I understand controlled substances:     Are prescribed by my care provider to help me function or work and manage my condition(s).?  Are strong medicines and can cause serious side effects.     Need to be taken exactly as prescribed.?Combining controlled substances with certain medicines or chemicals (such as illegal drugs, alcohol, sedatives, sleeping pills, and benzodiazepines) can be dangerous or even fatal.? If I stop taking my medicines suddenly, I may have severe withdrawal symptoms.     The risks,  Addended by: Dieter Kingston on: 11/4/2022 02:31 PM     Modules accepted: Orders benefits, and side effects of these medicine(s) were explained to me. I agree that:    I will take part in other treatments as advised by my care team. This may be psychiatry or counseling, physical therapy, behavioral therapy, group treatment or a referral to specialist.    I will keep all my appointments and understand this is part of the monitoring of controlled substances.?My care team may require an office visit for EVERY controlled substance refill. If I miss appointments or don t follow instructions, my care team may stop my medicine    I will take my medicines as prescribed. I will not change the dose or schedule unless my care team tells me to. There will be no refills if I run out early.      I may be asked to come to the clinic and complete a urine drug test or complete a pill count. If I don t give a urine sample or participate in a pill count, the care team may stop my medicine.    I will only receive controlled substance prescriptions from this clinic. If I am treated by another provider, I will tell them that I am taking controlled substances and that I have a treatment agreement with this provider. I will inform my [facility] care team within one business day if I am given a prescription for any controlled substance by another healthcare provider. My [facility] care team can contact other providers and pharmacists about my use of any medicines.    It is up to me to make sure that I don't run out of my medicines on weekends or holidays.?If my care team is willing to refill my prescription without a visit, I must request refills only during office hours. Refills may take up to 3 business days to process. I will use one pharmacy to fill all my controlled substance prescriptions. I will notify the clinic about any changes to my insurance or medicine availability.    I am responsible for my prescriptions. If the medicine/prescription is lost, stolen or destroyed, it will not be replaced.?I  also agree not to share controlled substance medicines with anyone.     I am aware I should not use any illegal or recreational drugs. I agree not to drink alcohol unless my care team says I can.     If I enroll in the [place] [facility] program, I will tell my care team before my next refill.    I will tell my care team right away if I become pregnant, have a new medical problem treated outside of my regular clinic, or have a change in my medicines.     I understand that this medicine can affect my thinking, judgment and reaction time.? Alcohol and drugs affect the brain and body, which can affect the safety of my driving. Being under the influence of alcohol or drugs can affect my decision-making, behaviors, personal safety and the safety of others. Driving while impaired (DWI) can occur if a person is driving, operating or in physical control of a car, motorcycle, boat, snowmobile, ATV, motorbike, off-road vehicle or any other motor vehicle ([place] Statute 169A.20). I understand the risk if I choose to drive or operate any vehicle or machinery.    I understand that if I do not follow any of the conditions above, my prescriptions or treatment may be stopped or changed.   I agree that my provider, clinic care team and pharmacy may work with any city, state or federal law enforcement agency that investigates the misuse, sale or other diversion of my controlled medicine. I will allow my provider to discuss my care with, or share a copy of, this agreement with any other treating provider, pharmacy or emergency room where I receive care.     I have read this agreement and have asked questions about anything I did not understand.    ________________________________________________________  Patient Signature - Angelic Marrero     ___________________                   Date     ________________________________________________________  Provider Signature - Ofelia Mclain, DO       ___________________                    Date     ________________________________________________________  Witness Signature (required if provider not present while patient signing)          ___________________                   Date

## 2025-03-26 LAB
CHOLEST SERPL-MCNC: 181 MG/DL
FASTING STATUS PATIENT QL REPORTED: ABNORMAL
HDLC SERPL-MCNC: 57 MG/DL
LDLC SERPL CALC-MCNC: 103 MG/DL
NONHDLC SERPL-MCNC: 124 MG/DL
TRIGL SERPL-MCNC: 105 MG/DL

## 2025-03-26 NOTE — RESULT ENCOUNTER NOTE
Patient updated by [employer] message with lab results.      Librado Walter,  Labs are returned.  Cholesterol labs stable from last check.  Keep working on healthy habits for heart health with regular physical activity, maintaining a healthy weight, and a heart healthy/nutrient dense diet.  Please reach out by [facility] with any follow-up questions or concerns.  Ofelia Mclain, DO

## 2025-05-02 ENCOUNTER — TRANSFERRED RECORDS (OUTPATIENT)
Dept: HEALTH INFORMATION MANAGEMENT | Facility: CLINIC | Age: 45
End: 2025-05-02
Payer: COMMERCIAL

## 2025-05-13 ENCOUNTER — ANCILLARY PROCEDURE (OUTPATIENT)
Dept: MAMMOGRAPHY | Facility: CLINIC | Age: 45
End: 2025-05-13
Attending: FAMILY MEDICINE
Payer: COMMERCIAL

## 2025-05-13 DIAGNOSIS — Z12.31 ENCOUNTER FOR SCREENING MAMMOGRAM FOR BREAST CANCER: ICD-10-CM

## 2025-05-13 PROCEDURE — 77063 BREAST TOMOSYNTHESIS BI: CPT

## 2025-05-16 ENCOUNTER — MYC REFILL (OUTPATIENT)
Dept: FAMILY MEDICINE | Facility: CLINIC | Age: 45
End: 2025-05-16
Payer: COMMERCIAL

## 2025-05-16 DIAGNOSIS — Z79.899 CONTROLLED SUBSTANCE AGREEMENT SIGNED: ICD-10-CM

## 2025-05-16 DIAGNOSIS — F90.2 ATTENTION DEFICIT HYPERACTIVITY DISORDER (ADHD), COMBINED TYPE: ICD-10-CM

## 2025-05-17 RX ORDER — LISDEXAMFETAMINE DIMESYLATE 50 MG/1
50 CAPSULE ORAL EVERY MORNING
Qty: 30 CAPSULE | Refills: 0 | Status: SHIPPED | OUTPATIENT
Start: 2025-05-21

## 2025-05-17 NOTE — TELEPHONE ENCOUNTER
1. Controlled substance agreement signed 3/25/25  2. Attention deficit hyperactivity disorder (ADHD), combined type  - lisdexamfetamine (VYVANSE) 50 MG capsule; Take 1 capsule (50 mg) by mouth every morning.  Dispense: 30 capsule; Refill: 0     Reviewed [place] . Due for refill. CSA up to date. Next med check scheduled. Refill sent to pharmacy.    Ofelia Mclain DO

## 2025-06-16 ENCOUNTER — MYC REFILL (OUTPATIENT)
Dept: FAMILY MEDICINE | Facility: CLINIC | Age: 45
End: 2025-06-16
Payer: COMMERCIAL

## 2025-06-16 DIAGNOSIS — F90.2 ATTENTION DEFICIT HYPERACTIVITY DISORDER (ADHD), COMBINED TYPE: ICD-10-CM

## 2025-06-16 DIAGNOSIS — Z79.899 CONTROLLED SUBSTANCE AGREEMENT SIGNED: ICD-10-CM

## 2025-06-16 RX ORDER — LISDEXAMFETAMINE DIMESYLATE 50 MG/1
50 CAPSULE ORAL EVERY MORNING
Qty: 30 CAPSULE | Refills: 0 | Status: SHIPPED | OUTPATIENT
Start: 2025-06-22

## 2025-07-16 ENCOUNTER — MYC REFILL (OUTPATIENT)
Dept: FAMILY MEDICINE | Facility: CLINIC | Age: 45
End: 2025-07-16
Payer: COMMERCIAL

## 2025-07-16 DIAGNOSIS — F90.2 ATTENTION DEFICIT HYPERACTIVITY DISORDER (ADHD), COMBINED TYPE: ICD-10-CM

## 2025-07-16 DIAGNOSIS — Z79.899 CONTROLLED SUBSTANCE AGREEMENT SIGNED: ICD-10-CM

## 2025-07-16 RX ORDER — LISDEXAMFETAMINE DIMESYLATE 50 MG/1
50 CAPSULE ORAL EVERY MORNING
Qty: 30 CAPSULE | Refills: 0 | Status: SHIPPED | OUTPATIENT
Start: 2025-07-25

## 2025-07-16 NOTE — TELEPHONE ENCOUNTER
1. Controlled substance agreement signed 3/25/25  2. Attention deficit hyperactivity disorder (ADHD), combined type  - lisdexamfetamine (VYVANSE) 50 MG capsule; Take 1 capsule (50 mg) by mouth every morning.  Dispense: 30 capsule; Refill: 0     Reviewed [place] . Coming due for refill.  CSA is up-to-date.  Next med check scheduled.  Refill sent to pharmacy.    Ofelia Mclain, DO

## 2025-08-02 ENCOUNTER — ORDERS ONLY (AUTO-RELEASED) (OUTPATIENT)
Dept: FAMILY MEDICINE | Facility: CLINIC | Age: 45
End: 2025-08-02
Payer: COMMERCIAL

## 2025-08-02 DIAGNOSIS — Z12.11 COLON CANCER SCREENING: ICD-10-CM

## 2025-08-19 LAB — NONINV COLON CA DNA+OCC BLD SCRN STL QL: NEGATIVE

## 2025-08-26 ENCOUNTER — MYC REFILL (OUTPATIENT)
Dept: FAMILY MEDICINE | Facility: CLINIC | Age: 45
End: 2025-08-26
Payer: COMMERCIAL

## 2025-08-26 DIAGNOSIS — Z79.899 CONTROLLED SUBSTANCE AGREEMENT SIGNED: ICD-10-CM

## 2025-08-26 DIAGNOSIS — F90.2 ATTENTION DEFICIT HYPERACTIVITY DISORDER (ADHD), COMBINED TYPE: ICD-10-CM

## 2025-08-26 RX ORDER — DEXTROAMPHETAMINE SACCHARATE, AMPHETAMINE ASPARTATE, DEXTROAMPHETAMINE SULFATE AND AMPHETAMINE SULFATE 2.5; 2.5; 2.5; 2.5 MG/1; MG/1; MG/1; MG/1
10 TABLET ORAL DAILY PRN
Qty: 30 TABLET | Refills: 0 | Status: SHIPPED | OUTPATIENT
Start: 2025-08-26

## 2025-08-26 RX ORDER — LISDEXAMFETAMINE DIMESYLATE 50 MG/1
50 CAPSULE ORAL EVERY MORNING
Qty: 30 CAPSULE | Refills: 0 | Status: SHIPPED | OUTPATIENT
Start: 2025-08-26